# Patient Record
Sex: MALE | Race: BLACK OR AFRICAN AMERICAN | NOT HISPANIC OR LATINO | Employment: FULL TIME | URBAN - METROPOLITAN AREA
[De-identification: names, ages, dates, MRNs, and addresses within clinical notes are randomized per-mention and may not be internally consistent; named-entity substitution may affect disease eponyms.]

---

## 2017-01-31 ENCOUNTER — TELEPHONE (OUTPATIENT)
Dept: UROLOGY | Facility: CLINIC | Age: 62
End: 2017-01-31

## 2017-01-31 NOTE — TELEPHONE ENCOUNTER
----- Message from Lela Calderon MA sent at 1/31/2017  2:14 PM CST -----  Contact: self  [phone]  States he is being referred by a patient who sees CHAUNCEY Kay and he states it is very important that he accepts him as a patient.  He has [employer] which is a medicaid plan and I explained to him we do not accept Medicaid for adults.  He asked me to sent the message to find out if he would accept him as a patient.

## 2020-08-19 ENCOUNTER — HOSPITAL ENCOUNTER (INPATIENT)
Facility: HOSPITAL | Age: 65
LOS: 5 days | Discharge: HOME | DRG: 177 | End: 2020-08-24
Attending: EMERGENCY MEDICINE | Admitting: INTERNAL MEDICINE
Payer: MEDICARE

## 2020-08-19 DIAGNOSIS — J18.9 PNEUMONIA OF BOTH LUNGS DUE TO INFECTIOUS ORGANISM, UNSPECIFIED PART OF LUNG: ICD-10-CM

## 2020-08-19 DIAGNOSIS — I50.9 HEART FAILURE: ICD-10-CM

## 2020-08-19 DIAGNOSIS — N17.9 AKI (ACUTE KIDNEY INJURY): ICD-10-CM

## 2020-08-19 DIAGNOSIS — I50.31 ACUTE DIASTOLIC CONGESTIVE HEART FAILURE: ICD-10-CM

## 2020-08-19 DIAGNOSIS — D64.9 ANEMIA, UNSPECIFIED TYPE: ICD-10-CM

## 2020-08-19 DIAGNOSIS — E11.65 TYPE 2 DIABETES MELLITUS WITH HYPERGLYCEMIA, WITHOUT LONG-TERM CURRENT USE OF INSULIN: Chronic | ICD-10-CM

## 2020-08-19 DIAGNOSIS — I10 ESSENTIAL HYPERTENSION: Chronic | ICD-10-CM

## 2020-08-19 DIAGNOSIS — R74.01 TRANSAMINITIS: ICD-10-CM

## 2020-08-19 DIAGNOSIS — R09.02 HYPOXIA: ICD-10-CM

## 2020-08-19 DIAGNOSIS — Z20.822 SUSPECTED COVID-19 VIRUS INFECTION: ICD-10-CM

## 2020-08-19 DIAGNOSIS — E87.5 HYPERKALEMIA: ICD-10-CM

## 2020-08-19 DIAGNOSIS — U07.1 COVID-19: Primary | ICD-10-CM

## 2020-08-19 PROBLEM — J96.01 ACUTE RESPIRATORY FAILURE WITH HYPOXIA: Status: ACTIVE | Noted: 2020-08-19

## 2020-08-19 LAB
ALBUMIN SERPL BCP-MCNC: 3.1 G/DL (ref 3.5–5.2)
ALLENS TEST: ABNORMAL
ALP SERPL-CCNC: 235 U/L (ref 55–135)
ALT SERPL W/O P-5'-P-CCNC: 147 U/L (ref 10–44)
ANION GAP SERPL CALC-SCNC: 12 MMOL/L (ref 8–16)
AST SERPL-CCNC: 159 U/L (ref 10–40)
BACTERIA #/AREA URNS HPF: NORMAL /HPF
BASOPHILS # BLD AUTO: 0.01 K/UL (ref 0–0.2)
BASOPHILS # BLD AUTO: 0.01 K/UL (ref 0–0.2)
BASOPHILS NFR BLD: 0.1 % (ref 0–1.9)
BASOPHILS NFR BLD: 0.1 % (ref 0–1.9)
BILIRUB SERPL-MCNC: 0.9 MG/DL (ref 0.1–1)
BILIRUB UR QL STRIP: NEGATIVE
BNP SERPL-MCNC: 131 PG/ML (ref 0–99)
BUN SERPL-MCNC: 31 MG/DL (ref 8–23)
CALCIUM SERPL-MCNC: 9.3 MG/DL (ref 8.7–10.5)
CHLORIDE SERPL-SCNC: 110 MMOL/L (ref 95–110)
CK SERPL-CCNC: 66 U/L (ref 20–200)
CLARITY UR: CLEAR
CO2 SERPL-SCNC: 20 MMOL/L (ref 23–29)
COLOR UR: YELLOW
CREAT SERPL-MCNC: 2.5 MG/DL (ref 0.5–1.4)
CRP SERPL-MCNC: 208.7 MG/L (ref 0–8.2)
CTP QC/QA: YES
D DIMER PPP IA.FEU-MCNC: 3.48 MG/L FEU
DELSYS: ABNORMAL
DIFFERENTIAL METHOD: ABNORMAL
DIFFERENTIAL METHOD: ABNORMAL
EOSINOPHIL # BLD AUTO: 0 K/UL (ref 0–0.5)
EOSINOPHIL # BLD AUTO: 0 K/UL (ref 0–0.5)
EOSINOPHIL NFR BLD: 0 % (ref 0–8)
EOSINOPHIL NFR BLD: 0.5 % (ref 0–8)
ERYTHROCYTE [DISTWIDTH] IN BLOOD BY AUTOMATED COUNT: 11.8 % (ref 11.5–14.5)
ERYTHROCYTE [DISTWIDTH] IN BLOOD BY AUTOMATED COUNT: 11.9 % (ref 11.5–14.5)
EST. GFR  (AFRICAN AMERICAN): 30 ML/MIN/1.73 M^2
EST. GFR  (NON AFRICAN AMERICAN): 26 ML/MIN/1.73 M^2
FACT X PPP CHRO-ACNC: 0.43 IU/ML (ref 0.3–0.7)
FERRITIN SERPL-MCNC: 3845 NG/ML (ref 20–300)
FIBRINOGEN PPP-MCNC: >800 MG/DL (ref 182–366)
FIO2: 50
GLUCOSE SERPL-MCNC: 212 MG/DL (ref 70–110)
GLUCOSE UR QL STRIP: ABNORMAL
HCO3 UR-SCNC: 17.2 MMOL/L (ref 24–28)
HCT VFR BLD AUTO: 22.6 % (ref 40–54)
HCT VFR BLD AUTO: 27.2 % (ref 40–54)
HGB BLD-MCNC: 7.3 G/DL (ref 14–18)
HGB BLD-MCNC: 9 G/DL (ref 14–18)
HGB UR QL STRIP: NEGATIVE
HYALINE CASTS #/AREA URNS LPF: 0 /LPF
IMM GRANULOCYTES # BLD AUTO: 0.17 K/UL (ref 0–0.04)
IMM GRANULOCYTES # BLD AUTO: 0.22 K/UL (ref 0–0.04)
IMM GRANULOCYTES NFR BLD AUTO: 2.2 % (ref 0–0.5)
IMM GRANULOCYTES NFR BLD AUTO: 2.3 % (ref 0–0.5)
INR PPP: 0.9 (ref 0.8–1.2)
INR PPP: 0.9 (ref 0.8–1.2)
IRON SERPL-MCNC: 27 UG/DL (ref 45–160)
KETONES UR QL STRIP: NEGATIVE
LACTATE SERPL-SCNC: 2 MMOL/L (ref 0.5–2.2)
LDH SERPL L TO P-CCNC: 768 U/L (ref 110–260)
LEUKOCYTE ESTERASE UR QL STRIP: NEGATIVE
LYMPHOCYTES # BLD AUTO: 0.8 K/UL (ref 1–4.8)
LYMPHOCYTES # BLD AUTO: 1.4 K/UL (ref 1–4.8)
LYMPHOCYTES NFR BLD: 19.1 % (ref 18–48)
LYMPHOCYTES NFR BLD: 8.1 % (ref 18–48)
MCH RBC QN AUTO: 30.3 PG (ref 27–31)
MCH RBC QN AUTO: 31 PG (ref 27–31)
MCHC RBC AUTO-ENTMCNC: 32.3 G/DL (ref 32–36)
MCHC RBC AUTO-ENTMCNC: 33.1 G/DL (ref 32–36)
MCV RBC AUTO: 94 FL (ref 82–98)
MCV RBC AUTO: 94 FL (ref 82–98)
MICROSCOPIC COMMENT: NORMAL
MODE: ABNORMAL
MONOCYTES # BLD AUTO: 0.4 K/UL (ref 0.3–1)
MONOCYTES # BLD AUTO: 0.4 K/UL (ref 0.3–1)
MONOCYTES NFR BLD: 3.7 % (ref 4–15)
MONOCYTES NFR BLD: 5.7 % (ref 4–15)
NEUTROPHILS # BLD AUTO: 5.3 K/UL (ref 1.8–7.7)
NEUTROPHILS # BLD AUTO: 8.5 K/UL (ref 1.8–7.7)
NEUTROPHILS NFR BLD: 72.3 % (ref 38–73)
NEUTROPHILS NFR BLD: 85.9 % (ref 38–73)
NITRITE UR QL STRIP: NEGATIVE
NRBC BLD-RTO: 0 /100 WBC
NRBC BLD-RTO: 0 /100 WBC
PCO2 BLDA: 27.4 MMHG (ref 35–45)
PH SMN: 7.4 [PH] (ref 7.35–7.45)
PH UR STRIP: 7 [PH] (ref 5–8)
PLATELET # BLD AUTO: 200 K/UL (ref 150–350)
PLATELET # BLD AUTO: 253 K/UL (ref 150–350)
PMV BLD AUTO: 12.1 FL (ref 9.2–12.9)
PMV BLD AUTO: 12.7 FL (ref 9.2–12.9)
PO2 BLDA: 55 MMHG (ref 80–100)
POC BE: -7 MMOL/L
POC MOLECULAR INFLUENZA A AGN: NEGATIVE
POC MOLECULAR INFLUENZA B AGN: NEGATIVE
POC SATURATED O2: 89 % (ref 95–100)
POC TCO2: 18 MMOL/L (ref 23–27)
POTASSIUM SERPL-SCNC: 5.4 MMOL/L (ref 3.5–5.1)
PROCALCITONIN SERPL IA-MCNC: 0.47 NG/ML
PROT SERPL-MCNC: 7.4 G/DL (ref 6–8.4)
PROT UR QL STRIP: ABNORMAL
PROTHROMBIN TIME: 10.2 SEC (ref 9–12.5)
PROTHROMBIN TIME: 9.8 SEC (ref 9–12.5)
RBC # BLD AUTO: 2.41 M/UL (ref 4.6–6.2)
RBC # BLD AUTO: 2.9 M/UL (ref 4.6–6.2)
RBC #/AREA URNS HPF: 3 /HPF (ref 0–4)
RETICS/RBC NFR AUTO: 0.9 % (ref 0.4–2)
SAMPLE: ABNORMAL
SARS-COV-2 RDRP RESP QL NAA+PROBE: POSITIVE
SATURATED IRON: 13 % (ref 20–50)
SITE: ABNORMAL
SODIUM SERPL-SCNC: 142 MMOL/L (ref 136–145)
SP GR UR STRIP: 1.01 (ref 1–1.03)
SQUAMOUS #/AREA URNS HPF: 2 /HPF
TOTAL IRON BINDING CAPACITY: 209 UG/DL (ref 250–450)
TRANSFERRIN SERPL-MCNC: 141 MG/DL (ref 200–375)
TROPONIN I SERPL DL<=0.01 NG/ML-MCNC: 0.03 NG/ML (ref 0–0.03)
URN SPEC COLLECT METH UR: ABNORMAL
UROBILINOGEN UR STRIP-ACNC: NEGATIVE EU/DL
WBC # BLD AUTO: 7.38 K/UL (ref 3.9–12.7)
WBC # BLD AUTO: 9.87 K/UL (ref 3.9–12.7)
WBC #/AREA URNS HPF: 0 /HPF (ref 0–5)

## 2020-08-19 PROCEDURE — 84145 PROCALCITONIN (PCT): CPT

## 2020-08-19 PROCEDURE — 25000003 PHARM REV CODE 250: Performed by: EMERGENCY MEDICINE

## 2020-08-19 PROCEDURE — U0002 COVID-19 LAB TEST NON-CDC: HCPCS

## 2020-08-19 PROCEDURE — 84484 ASSAY OF TROPONIN QUANT: CPT

## 2020-08-19 PROCEDURE — 83615 LACTATE (LD) (LDH) ENZYME: CPT

## 2020-08-19 PROCEDURE — 81000 URINALYSIS NONAUTO W/SCOPE: CPT

## 2020-08-19 PROCEDURE — 85610 PROTHROMBIN TIME: CPT | Mod: 91

## 2020-08-19 PROCEDURE — 86140 C-REACTIVE PROTEIN: CPT

## 2020-08-19 PROCEDURE — 82803 BLOOD GASES ANY COMBINATION: CPT

## 2020-08-19 PROCEDURE — 80053 COMPREHEN METABOLIC PANEL: CPT

## 2020-08-19 PROCEDURE — 63600175 PHARM REV CODE 636 W HCPCS: Performed by: EMERGENCY MEDICINE

## 2020-08-19 PROCEDURE — 96361 HYDRATE IV INFUSION ADD-ON: CPT

## 2020-08-19 PROCEDURE — 85384 FIBRINOGEN ACTIVITY: CPT

## 2020-08-19 PROCEDURE — 83605 ASSAY OF LACTIC ACID: CPT

## 2020-08-19 PROCEDURE — 83540 ASSAY OF IRON: CPT

## 2020-08-19 PROCEDURE — 85025 COMPLETE CBC W/AUTO DIFF WBC: CPT | Mod: 91

## 2020-08-19 PROCEDURE — 99223 1ST HOSP IP/OBS HIGH 75: CPT | Mod: AI,,, | Performed by: INTERNAL MEDICINE

## 2020-08-19 PROCEDURE — 99223 PR INITIAL HOSPITAL CARE,LEVL III: ICD-10-PCS | Mod: AI,,, | Performed by: INTERNAL MEDICINE

## 2020-08-19 PROCEDURE — 25000003 PHARM REV CODE 250: Performed by: STUDENT IN AN ORGANIZED HEALTH CARE EDUCATION/TRAINING PROGRAM

## 2020-08-19 PROCEDURE — 36600 WITHDRAWAL OF ARTERIAL BLOOD: CPT

## 2020-08-19 PROCEDURE — 20000000 HC ICU ROOM

## 2020-08-19 PROCEDURE — 94761 N-INVAS EAR/PLS OXIMETRY MLT: CPT

## 2020-08-19 PROCEDURE — 85379 FIBRIN DEGRADATION QUANT: CPT

## 2020-08-19 PROCEDURE — 96374 THER/PROPH/DIAG INJ IV PUSH: CPT | Mod: 59

## 2020-08-19 PROCEDURE — 80048 BASIC METABOLIC PNL TOTAL CA: CPT

## 2020-08-19 PROCEDURE — 87502 INFLUENZA DNA AMP PROBE: CPT | Mod: 59

## 2020-08-19 PROCEDURE — 96366 THER/PROPH/DIAG IV INF ADDON: CPT

## 2020-08-19 PROCEDURE — 83880 ASSAY OF NATRIURETIC PEPTIDE: CPT

## 2020-08-19 PROCEDURE — 99900035 HC TECH TIME PER 15 MIN (STAT)

## 2020-08-19 PROCEDURE — 99285 EMERGENCY DEPT VISIT HI MDM: CPT | Mod: 25

## 2020-08-19 PROCEDURE — 27100171 HC OXYGEN HIGH FLOW UP TO 24 HOURS

## 2020-08-19 PROCEDURE — 96375 TX/PRO/DX INJ NEW DRUG ADDON: CPT

## 2020-08-19 PROCEDURE — 87040 BLOOD CULTURE FOR BACTERIA: CPT

## 2020-08-19 PROCEDURE — 85025 COMPLETE CBC W/AUTO DIFF WBC: CPT

## 2020-08-19 PROCEDURE — 93010 ELECTROCARDIOGRAM REPORT: CPT | Mod: ,,, | Performed by: INTERNAL MEDICINE

## 2020-08-19 PROCEDURE — 83010 ASSAY OF HAPTOGLOBIN QUANT: CPT

## 2020-08-19 PROCEDURE — 93010 EKG 12-LEAD: ICD-10-PCS | Mod: ,,, | Performed by: INTERNAL MEDICINE

## 2020-08-19 PROCEDURE — 82728 ASSAY OF FERRITIN: CPT

## 2020-08-19 PROCEDURE — 96365 THER/PROPH/DIAG IV INF INIT: CPT

## 2020-08-19 PROCEDURE — 82550 ASSAY OF CK (CPK): CPT

## 2020-08-19 PROCEDURE — 85045 AUTOMATED RETICULOCYTE COUNT: CPT

## 2020-08-19 PROCEDURE — 85610 PROTHROMBIN TIME: CPT

## 2020-08-19 PROCEDURE — 25000003 PHARM REV CODE 250: Performed by: NURSE PRACTITIONER

## 2020-08-19 PROCEDURE — 85520 HEPARIN ASSAY: CPT

## 2020-08-19 PROCEDURE — 63600175 PHARM REV CODE 636 W HCPCS: Performed by: NURSE PRACTITIONER

## 2020-08-19 PROCEDURE — 93005 ELECTROCARDIOGRAM TRACING: CPT

## 2020-08-19 RX ORDER — GLIMEPIRIDE 4 MG/1
4 TABLET ORAL
Status: ON HOLD | COMMUNITY
End: 2021-05-04 | Stop reason: HOSPADM

## 2020-08-19 RX ORDER — INSULIN ASPART 100 [IU]/ML
0-5 INJECTION, SOLUTION INTRAVENOUS; SUBCUTANEOUS EVERY 6 HOURS PRN
Status: DISCONTINUED | OUTPATIENT
Start: 2020-08-19 | End: 2020-08-20

## 2020-08-19 RX ORDER — CLONIDINE HYDROCHLORIDE 0.1 MG/1
0.1 TABLET ORAL 2 TIMES DAILY
Status: ON HOLD | COMMUNITY
End: 2020-08-24 | Stop reason: HOSPADM

## 2020-08-19 RX ORDER — ENOXAPARIN SODIUM 100 MG/ML
40 INJECTION SUBCUTANEOUS
Status: COMPLETED | OUTPATIENT
Start: 2020-08-19 | End: 2020-08-19

## 2020-08-19 RX ORDER — DOXAZOSIN 1 MG/1
2 TABLET ORAL NIGHTLY
Status: DISCONTINUED | OUTPATIENT
Start: 2020-08-20 | End: 2020-08-24 | Stop reason: HOSPADM

## 2020-08-19 RX ORDER — METOPROLOL TARTRATE 50 MG/1
50 TABLET ORAL 2 TIMES DAILY
Status: ON HOLD | COMMUNITY
End: 2020-08-24 | Stop reason: HOSPADM

## 2020-08-19 RX ORDER — NAPROXEN SODIUM 220 MG/1
81 TABLET, FILM COATED ORAL DAILY
COMMUNITY

## 2020-08-19 RX ORDER — TALC
6 POWDER (GRAM) TOPICAL NIGHTLY PRN
Status: DISCONTINUED | OUTPATIENT
Start: 2020-08-19 | End: 2020-08-24 | Stop reason: HOSPADM

## 2020-08-19 RX ORDER — NICARDIPINE HYDROCHLORIDE 0.2 MG/ML
2.5 INJECTION INTRAVENOUS CONTINUOUS
Status: DISCONTINUED | OUTPATIENT
Start: 2020-08-19 | End: 2020-08-20

## 2020-08-19 RX ORDER — HEPARIN SODIUM,PORCINE/D5W 25000/250
12 INTRAVENOUS SOLUTION INTRAVENOUS CONTINUOUS
Status: DISCONTINUED | OUTPATIENT
Start: 2020-08-19 | End: 2020-08-22

## 2020-08-19 RX ORDER — DOXAZOSIN 2 MG/1
2 TABLET ORAL NIGHTLY
Status: ON HOLD | COMMUNITY
End: 2021-05-04 | Stop reason: HOSPADM

## 2020-08-19 RX ORDER — IRBESARTAN 300 MG/1
300 TABLET ORAL NIGHTLY
Status: ON HOLD | COMMUNITY
End: 2021-05-04 | Stop reason: HOSPADM

## 2020-08-19 RX ORDER — METFORMIN HYDROCHLORIDE 1000 MG/1
1000 TABLET ORAL 2 TIMES DAILY WITH MEALS
Status: ON HOLD | COMMUNITY
End: 2020-08-24 | Stop reason: SDUPTHER

## 2020-08-19 RX ORDER — DEXAMETHASONE SODIUM PHOSPHATE 4 MG/ML
6 INJECTION, SOLUTION INTRA-ARTICULAR; INTRALESIONAL; INTRAMUSCULAR; INTRAVENOUS; SOFT TISSUE DAILY
Status: DISCONTINUED | OUTPATIENT
Start: 2020-08-20 | End: 2020-08-21

## 2020-08-19 RX ORDER — CARVEDILOL 6.25 MG/1
6.25 TABLET ORAL 2 TIMES DAILY
Status: DISCONTINUED | OUTPATIENT
Start: 2020-08-19 | End: 2020-08-20

## 2020-08-19 RX ORDER — DEXAMETHASONE SODIUM PHOSPHATE 4 MG/ML
6 INJECTION, SOLUTION INTRA-ARTICULAR; INTRALESIONAL; INTRAMUSCULAR; INTRAVENOUS; SOFT TISSUE
Status: COMPLETED | OUTPATIENT
Start: 2020-08-19 | End: 2020-08-19

## 2020-08-19 RX ORDER — GLUCAGON 1 MG
1 KIT INJECTION
Status: DISCONTINUED | OUTPATIENT
Start: 2020-08-19 | End: 2020-08-20

## 2020-08-19 RX ORDER — PRAVASTATIN SODIUM 40 MG/1
40 TABLET ORAL DAILY
COMMUNITY

## 2020-08-19 RX ADMIN — MELATONIN TAB 3 MG 6 MG: 3 TAB at 11:08

## 2020-08-19 RX ADMIN — SODIUM CHLORIDE 500 ML: 0.9 INJECTION, SOLUTION INTRAVENOUS at 02:08

## 2020-08-19 RX ADMIN — NICARDIPINE HYDROCHLORIDE 15 MG/HR: 0.2 INJECTION, SOLUTION INTRAVENOUS at 08:08

## 2020-08-19 RX ADMIN — NICARDIPINE HYDROCHLORIDE 2.5 MG/HR: 0.2 INJECTION, SOLUTION INTRAVENOUS at 04:08

## 2020-08-19 RX ADMIN — ENOXAPARIN SODIUM 40 MG: 40 INJECTION SUBCUTANEOUS at 02:08

## 2020-08-19 RX ADMIN — DEXAMETHASONE SODIUM PHOSPHATE 6 MG: 4 INJECTION, SOLUTION INTRA-ARTICULAR; INTRALESIONAL; INTRAMUSCULAR; INTRAVENOUS; SOFT TISSUE at 02:08

## 2020-08-19 RX ADMIN — CARVEDILOL 6.25 MG: 6.25 TABLET, FILM COATED ORAL at 11:08

## 2020-08-19 RX ADMIN — HEPARIN SODIUM 12 UNITS/KG/HR: 5000 INJECTION INTRAVENOUS; SUBCUTANEOUS at 09:08

## 2020-08-19 RX ADMIN — NICARDIPINE HYDROCHLORIDE 8 MG/HR: 0.2 INJECTION, SOLUTION INTRAVENOUS at 11:08

## 2020-08-19 RX ADMIN — AZITHROMYCIN 500 MG: 500 INJECTION, POWDER, LYOPHILIZED, FOR SOLUTION INTRAVENOUS at 04:08

## 2020-08-19 RX ADMIN — CEFTRIAXONE 1 G: 1 INJECTION, SOLUTION INTRAVENOUS at 05:08

## 2020-08-19 RX ADMIN — NICARDIPINE HYDROCHLORIDE 15 MG/HR: 0.2 INJECTION, SOLUTION INTRAVENOUS at 07:08

## 2020-08-19 NOTE — ED TRIAGE NOTES
Had cataract surgery x 5 days ago.  Called EMS for elevated bp, headache, and cough.  Upon arrival complaints of chest discomfort with sob.  Initial O2 sat of 61 %.

## 2020-08-19 NOTE — ED PROVIDER NOTES
"EM PHYSICIAN NOTE    HPI  This patient presents with a complaint of   Chief Complaint   Patient presents with    Shortness of Breath     pt called EMS for elevated BP, headache, and cough.  upon arival found with chest discomfort with sob x 2 days.   denies n/v/d or fever.   denies HA upon arrival to ed.       HPI:  This is a 65-year-old gentleman with a past history of non-insulin-dependent diabetes, CKD and recent cataract surgery approximately 10 days ago who presents the emergency department with about 2 days shortness of breath.  He denies fever or chills.  No nausea or vomiting.  No diarrhea.  He does report he has a mild headache and chest discomfort.  He has had a cough and he called EMS because he was coughing and short of breath.    REVIEW of PMH, SOC History and Family History:  Past Medical History:   Diagnosis Date    Diabetes mellitus, type 2     Hyperlipidemia     Hypertension 2004     No past surgical history on file.  Review of patient's allergies indicates:  No Known Allergies    Family history and social history reviewed.      REVIEW of SYSTEMS  Source:  Patient  The nurse's notes and triage vital signs were reviewed.  GENERAL/CONSTITUTIONAL: There is no report of fever, weakness, or unexplained weight loss.  CARDIOVASCULAR:  See HPI  RESPIRATORY:  See HPI  GASTROINTESTINAL: There is no report of nausea, vomiting, diarrhea  MUSCULOSKELETAL: There is no report of joint or muscle pain. No muscle weakness or tenderness.  SKIN AND BREASTS: There is no report of easy bruising, skin redness, skin rash.  HEMATOLOGIC/LYMPHATIC: There is no report of anemia, bleeding or clotting defects. There is no report of anticoagulant use.  The remainder of the ROS is negative.        PHYSICAL EXAMINATION    ED Triage Vitals [08/19/20 1323]   Enc Vitals Group      BP (!) 222/100      Pulse (!) 113      Resp 18      Temp 99 °F (37.2 °C)      Temp src Oral      SpO2 (!) 77 %      Weight 160 lb      Height 5' 8"      " Head Circumference       Peak Flow       Pain Score       Pain Loc       Pain Edu?       Excl. in GC?      Vital signs and Pulse Ox reviewed in clinical context. Abnormalities noted: Tachycardia, Tachypnea, Hypoxia on pulse ox  Pt's level of consciousness is alert, and the patient is in moderate distress.  Skin: warm, pink and dry  Mucosa:dry  Head and Neck: WNL  Cardiac exam: RRR, tachycardia  Pulmonary exam:  Tachypneic with crackles throughout.  Mild JVD.  Abd Exam: soft nontender   Musculoskeletal: no joint tenderness, deformity or swelling   Neurologic: GCS 15. 5 over 5 strength, cranial nerves intact, neck supple       Initial Impression:  Hypoxia, rule out CHF, rule out PE, rule out COVID  Plan:  Supplementary oxygen, labs, imaging, reassess  Micelle JYOTI Coughlin MD, 1:20 PM 8/19/2020      Medical decision making:   Nurses notes and Vital Signs reviewed.  Orders Placed This Encounter   Procedures    Blood culture x two cultures. Draw prior to antibiotics.    X-Ray Chest AP Portable    CBC auto differential    Comprehensive metabolic panel    C-Reactive Protein    Ferritin    Lactate Dehydrogenase    CK    Lactic Acid, Plasma    Troponin I    COVID-19 Rapid Screening    Procalcitonin    Brain Natriuretic Peptide    D dimer, quantitative    Urinalysis Clean Catch    Fibrinogen    Haptoglobin    Protime-INR    Reticulocytes    Iron and TIBC    Urinalysis Microscopic    Airborne and Contact and Droplet Isolation Status    Airborne and Contact and Droplet Isolation Status    Oxygen Continuous    Oxygen Continuous    POCT ARTERIAL BLOOD GAS Blood Gas    POCT Influenza A/B Molecular    EKG 12-lead    Saline lock IV    [facility] Facilitated Request from Lat[facility] Art, and [facility]       ED Course as of Aug 19 1729   Wed Aug 19, 2020   1409 The CBC reveals anemia at 7 and 22.  There is no old ones for comparison.    []   1409 COVID positive    []   1412 Moderate to severe opacification  with ill-defined consolidation within the perihilar and lower lobes while nonspecific differential to include COVID-19 pneumoniae.     [MH]   1432 Dr. Feliciano, Nelson , accept pt to [facility].    [MH]   1454 BNP elevated at 131    [MH]   1455 Troponin elevated at 0.031.    [MH]   1455 My independent interpretation of the EKG is sinus tachycardia at 107 beats per minute.  There is LVH.  There is no ST segment elevation or depression.  Normal axis normal intervals.  No old EKGs for comparison    [MH]   1456 CMP reviewed.  There is KOURTNEY I with a creatinine of 2.5.  The BUN is elevated at 31.  Potassium is elevated 5.4.  CO2 is slightly decreased at 20.  Glucose is elevated 212.  Anion gap is 12.    [MH]   1457 D-dimer is elevated at 3480    [MH]   1457 LV is elevated at 768    [MH]   1457 Unable to maintain O2 sats above 90% on a Venti mask and the patient was switched to 100% non-rebreather.  His sats remained 95 percent on 100% non-rebreather.  ABG pending.    [MH]   1713 Wife's phone number is [phone]    [MH]      ED Course User Index  [MH] Kaushal Coughlin MD       4:03 PM  The medical management of Filippo Martinez has been transferred to the admitting team. Total Critical Care Time provided by me was: 75 minutes and included frequent in-person monitoring of patient including vital signs, neurological status, NIHSS or ICP monitoring with ongoing treatment changes based on patient need  Coordination of care with other physicians  Review and interpretation of radiologic studies with re-assessment of plan of care  Review and interpretation of laboratory studies with re-assessment of plan of care  Review of diagnosis, treatment options and prognosis with patient  Review of diagnosis, treatment options and prognosis with family/caregiver.  At this time, the patient's condition is unchanged, and this was relayed to the accepting team.  Please see notes from the admitting team for continued care.  Kaushal SMITH  Madyson 4:03 PM          Diagnoses that have been ruled out:   None   Diagnoses that are still under consideration:   None   Final diagnoses:   Suspected Covid-19 Virus Infection   COVID-19   Hypoxia   Pneumonia of both lungs due to infectious organism, unspecified part of lung   KOURTNEY (acute kidney injury)   Transaminitis   Anemia, unspecified type            Follow-up Information    None       ED Prescriptions     None          This note was created using Dictation Software.  This program may occasionally misinterpret certain words and phrases.      SCRIBE ATTESTATION NOTE:  No scribe was used in the writing of this note  Nurses notes were reviewed.                      Kaushal Coughlin MD  08/19/20 1603       Kaushal Coughlin MD  08/19/20 1604       Kaushal Coughlin MD  08/19/20 0951

## 2020-08-19 NOTE — PLAN OF CARE
(Physician in Lead of Transfers)   Outside Transfer Acceptance Note / Regional Referral Center    Transferring Physician: Tram Coughlin MD (ED)    Accepting Physician: Yazmin Yusuf MD    Date of Acceptance: 08/19/2020    Transferring Facility:  ED    Reason for Transfer: COVID critical illness    Report from Transferring Physician/Hospital course: 65M with HTN, HLD, DM-2 (orals), CKD (unkknown stage), cataracts s/p surgery 10 days ago, COVID negative at that time, who presented to  ED with SOB, cough, chest discomfort, HA, and elevated BP.  No fever, no GI Sx.  In ED, COVID +, 61% on RA -- on VM, unable to get sats above 90% --> placed on 100% NRB can get above 90% sat, and RR wnl, appears comfortable.  On PE, B crackles, no JVD, dry mucosa, abd s/nt/nd. CXR - diffuse infiltrates. Hb 7/22  (last labs 2017 in Care Everywhere, Hb 12), Cr 2.5 (unknown baseline), K 5.4. trop 0.03. LDH increased, D Dimer 3,480.  Given Dex IV, enoxaparin 40, adding CTX/Azithro.  Transfer [place] [facility], accepted by Dr Hurtado.    VS: see above and Epic    Labs: see above and Epic    Radiographs: see above and Epic    To Do List: f/u ABG, anemia labs (including hemolysis panel),  Intubation watch. Consider CTA.     Upon patient arrival to floor, please send SecureChat to [facility] or call extension [phone] (if no answer, this will flip to a beeper, so enter your call back number) for Hospital Medicine admit team assignment and for additional admit orders for the patient.  Do not page the attending physician associated with the patient on arrival (this physician may not be on duty at the time of arrival).  Rather, always call [phone] to reach the triage physician for orders and team assignment.    Yazmin Yusuf MD  Hospital Medicine Staff  Cell: [phone]

## 2020-08-20 LAB
ALBUMIN SERPL BCP-MCNC: 2.9 G/DL (ref 3.5–5.2)
ALP SERPL-CCNC: 233 U/L (ref 55–135)
ALT SERPL W/O P-5'-P-CCNC: 118 U/L (ref 10–44)
ANION GAP SERPL CALC-SCNC: 10 MMOL/L (ref 8–16)
ANION GAP SERPL CALC-SCNC: 11 MMOL/L (ref 8–16)
ANION GAP SERPL CALC-SCNC: 11 MMOL/L (ref 8–16)
ANION GAP SERPL CALC-SCNC: 12 MMOL/L (ref 8–16)
ANION GAP SERPL CALC-SCNC: 16 MMOL/L (ref 8–16)
ANISOCYTOSIS BLD QL SMEAR: SLIGHT
AST SERPL-CCNC: 76 U/L (ref 10–40)
B-OH-BUTYR BLD STRIP-SCNC: 0.7 MMOL/L (ref 0–0.5)
BASOPHILS # BLD AUTO: 0 K/UL (ref 0–0.2)
BASOPHILS NFR BLD: 0 % (ref 0–1.9)
BILIRUB SERPL-MCNC: 0.5 MG/DL (ref 0.1–1)
BUN SERPL-MCNC: 31 MG/DL (ref 8–23)
BUN SERPL-MCNC: 33 MG/DL (ref 8–23)
BUN SERPL-MCNC: 34 MG/DL (ref 8–23)
BUN SERPL-MCNC: 35 MG/DL (ref 8–23)
BUN SERPL-MCNC: 35 MG/DL (ref 8–23)
CALCIUM SERPL-MCNC: 9.2 MG/DL (ref 8.7–10.5)
CALCIUM SERPL-MCNC: 9.3 MG/DL (ref 8.7–10.5)
CALCIUM SERPL-MCNC: 9.3 MG/DL (ref 8.7–10.5)
CALCIUM SERPL-MCNC: 9.4 MG/DL (ref 8.7–10.5)
CALCIUM SERPL-MCNC: 9.7 MG/DL (ref 8.7–10.5)
CHLORIDE SERPL-SCNC: 107 MMOL/L (ref 95–110)
CHLORIDE SERPL-SCNC: 108 MMOL/L (ref 95–110)
CHLORIDE SERPL-SCNC: 111 MMOL/L (ref 95–110)
CO2 SERPL-SCNC: 16 MMOL/L (ref 23–29)
CO2 SERPL-SCNC: 20 MMOL/L (ref 23–29)
CO2 SERPL-SCNC: 23 MMOL/L (ref 23–29)
CREAT SERPL-MCNC: 2.2 MG/DL (ref 0.5–1.4)
CREAT SERPL-MCNC: 2.3 MG/DL (ref 0.5–1.4)
CREAT SERPL-MCNC: 2.5 MG/DL (ref 0.5–1.4)
CREAT SERPL-MCNC: 2.6 MG/DL (ref 0.5–1.4)
CREAT SERPL-MCNC: 2.6 MG/DL (ref 0.5–1.4)
D DIMER PPP IA.FEU-MCNC: 2.19 MG/L FEU
DACRYOCYTES BLD QL SMEAR: ABNORMAL
DIFFERENTIAL METHOD: ABNORMAL
EOSINOPHIL # BLD AUTO: 0 K/UL (ref 0–0.5)
EOSINOPHIL NFR BLD: 0 % (ref 0–8)
ERYTHROCYTE [DISTWIDTH] IN BLOOD BY AUTOMATED COUNT: 11.9 % (ref 11.5–14.5)
EST. GFR  (AFRICAN AMERICAN): 28.6 ML/MIN/1.73 M^2
EST. GFR  (AFRICAN AMERICAN): 28.6 ML/MIN/1.73 M^2
EST. GFR  (AFRICAN AMERICAN): 30 ML/MIN/1.73 M^2
EST. GFR  (AFRICAN AMERICAN): 33.2 ML/MIN/1.73 M^2
EST. GFR  (AFRICAN AMERICAN): 35 ML/MIN/1.73 M^2
EST. GFR  (NON AFRICAN AMERICAN): 24.8 ML/MIN/1.73 M^2
EST. GFR  (NON AFRICAN AMERICAN): 24.8 ML/MIN/1.73 M^2
EST. GFR  (NON AFRICAN AMERICAN): 26 ML/MIN/1.73 M^2
EST. GFR  (NON AFRICAN AMERICAN): 28.7 ML/MIN/1.73 M^2
EST. GFR  (NON AFRICAN AMERICAN): 30.3 ML/MIN/1.73 M^2
ESTIMATED AVG GLUCOSE: 163 MG/DL (ref 68–131)
FACT X PPP CHRO-ACNC: 0.3 IU/ML (ref 0.3–0.7)
FACT X PPP CHRO-ACNC: 0.57 IU/ML (ref 0.3–0.7)
GLUCOSE SERPL-MCNC: 128 MG/DL (ref 70–110)
GLUCOSE SERPL-MCNC: 215 MG/DL (ref 70–110)
GLUCOSE SERPL-MCNC: 469 MG/DL (ref 70–110)
GLUCOSE SERPL-MCNC: 469 MG/DL (ref 70–110)
GLUCOSE SERPL-MCNC: 503 MG/DL (ref 70–110)
HAPTOGLOB SERPL-MCNC: 546 MG/DL (ref 30–250)
HAPTOGLOB SERPL-MCNC: 548 MG/DL (ref 30–250)
HBA1C MFR BLD HPLC: 7.3 % (ref 4–5.6)
HCT VFR BLD AUTO: 22.8 % (ref 40–54)
HGB BLD-MCNC: 7.4 G/DL (ref 14–18)
IMM GRANULOCYTES # BLD AUTO: 0.24 K/UL (ref 0–0.04)
IMM GRANULOCYTES NFR BLD AUTO: 3.7 % (ref 0–0.5)
LYMPHOCYTES # BLD AUTO: 0.9 K/UL (ref 1–4.8)
LYMPHOCYTES NFR BLD: 13.3 % (ref 18–48)
MAGNESIUM SERPL-MCNC: 2.3 MG/DL (ref 1.6–2.6)
MCH RBC QN AUTO: 30.5 PG (ref 27–31)
MCHC RBC AUTO-ENTMCNC: 32.5 G/DL (ref 32–36)
MCV RBC AUTO: 94 FL (ref 82–98)
MONOCYTES # BLD AUTO: 0.3 K/UL (ref 0.3–1)
MONOCYTES NFR BLD: 5.3 % (ref 4–15)
NEUTROPHILS # BLD AUTO: 5 K/UL (ref 1.8–7.7)
NEUTROPHILS NFR BLD: 77.7 % (ref 38–73)
NRBC BLD-RTO: 0 /100 WBC
PHOSPHATE SERPL-MCNC: 4 MG/DL (ref 2.7–4.5)
PLATELET # BLD AUTO: 251 K/UL (ref 150–350)
PLATELET BLD QL SMEAR: ABNORMAL
PMV BLD AUTO: 12.7 FL (ref 9.2–12.9)
POCT GLUCOSE: 102 MG/DL (ref 70–110)
POCT GLUCOSE: 107 MG/DL (ref 70–110)
POCT GLUCOSE: 136 MG/DL (ref 70–110)
POCT GLUCOSE: 141 MG/DL (ref 70–110)
POCT GLUCOSE: 150 MG/DL (ref 70–110)
POCT GLUCOSE: 188 MG/DL (ref 70–110)
POCT GLUCOSE: 189 MG/DL (ref 70–110)
POCT GLUCOSE: 201 MG/DL (ref 70–110)
POCT GLUCOSE: 230 MG/DL (ref 70–110)
POCT GLUCOSE: 253 MG/DL (ref 70–110)
POCT GLUCOSE: 264 MG/DL (ref 70–110)
POCT GLUCOSE: 275 MG/DL (ref 70–110)
POCT GLUCOSE: 286 MG/DL (ref 70–110)
POCT GLUCOSE: 300 MG/DL (ref 70–110)
POCT GLUCOSE: 323 MG/DL (ref 70–110)
POCT GLUCOSE: 375 MG/DL (ref 70–110)
POCT GLUCOSE: 432 MG/DL (ref 70–110)
POCT GLUCOSE: 465 MG/DL (ref 70–110)
POCT GLUCOSE: 468 MG/DL (ref 70–110)
POCT GLUCOSE: 469 MG/DL (ref 70–110)
POCT GLUCOSE: 80 MG/DL (ref 70–110)
POIKILOCYTOSIS BLD QL SMEAR: SLIGHT
POTASSIUM SERPL-SCNC: 4.8 MMOL/L (ref 3.5–5.1)
POTASSIUM SERPL-SCNC: 4.8 MMOL/L (ref 3.5–5.1)
POTASSIUM SERPL-SCNC: 5 MMOL/L (ref 3.5–5.1)
POTASSIUM SERPL-SCNC: 5 MMOL/L (ref 3.5–5.1)
POTASSIUM SERPL-SCNC: 5.5 MMOL/L (ref 3.5–5.1)
POTASSIUM SERPL-SCNC: 5.9 MMOL/L (ref 3.5–5.1)
PROT SERPL-MCNC: 7.7 G/DL (ref 6–8.4)
RBC # BLD AUTO: 2.43 M/UL (ref 4.6–6.2)
SODIUM SERPL-SCNC: 138 MMOL/L (ref 136–145)
SODIUM SERPL-SCNC: 138 MMOL/L (ref 136–145)
SODIUM SERPL-SCNC: 139 MMOL/L (ref 136–145)
SODIUM SERPL-SCNC: 141 MMOL/L (ref 136–145)
SODIUM SERPL-SCNC: 143 MMOL/L (ref 136–145)
TARGETS BLD QL SMEAR: ABNORMAL
TROPONIN I SERPL DL<=0.01 NG/ML-MCNC: 0.02 NG/ML (ref 0–0.03)
WBC # BLD AUTO: 6.47 K/UL (ref 3.9–12.7)

## 2020-08-20 PROCEDURE — 83036 HEMOGLOBIN GLYCOSYLATED A1C: CPT

## 2020-08-20 PROCEDURE — 80053 COMPREHEN METABOLIC PANEL: CPT

## 2020-08-20 PROCEDURE — 20000000 HC ICU ROOM

## 2020-08-20 PROCEDURE — 80048 BASIC METABOLIC PNL TOTAL CA: CPT | Mod: 91

## 2020-08-20 PROCEDURE — 85520 HEPARIN ASSAY: CPT

## 2020-08-20 PROCEDURE — 85520 HEPARIN ASSAY: CPT | Mod: 91

## 2020-08-20 PROCEDURE — C9399 UNCLASSIFIED DRUGS OR BIOLOG: HCPCS | Performed by: STUDENT IN AN ORGANIZED HEALTH CARE EDUCATION/TRAINING PROGRAM

## 2020-08-20 PROCEDURE — 25000003 PHARM REV CODE 250: Performed by: STUDENT IN AN ORGANIZED HEALTH CARE EDUCATION/TRAINING PROGRAM

## 2020-08-20 PROCEDURE — 94761 N-INVAS EAR/PLS OXIMETRY MLT: CPT

## 2020-08-20 PROCEDURE — 63600175 PHARM REV CODE 636 W HCPCS: Performed by: STUDENT IN AN ORGANIZED HEALTH CARE EDUCATION/TRAINING PROGRAM

## 2020-08-20 PROCEDURE — 82010 KETONE BODYS QUAN: CPT

## 2020-08-20 PROCEDURE — 83010 ASSAY OF HAPTOGLOBIN QUANT: CPT

## 2020-08-20 PROCEDURE — 27100171 HC OXYGEN HIGH FLOW UP TO 24 HOURS

## 2020-08-20 PROCEDURE — 84100 ASSAY OF PHOSPHORUS: CPT

## 2020-08-20 PROCEDURE — 84484 ASSAY OF TROPONIN QUANT: CPT

## 2020-08-20 PROCEDURE — 85379 FIBRIN DEGRADATION QUANT: CPT

## 2020-08-20 PROCEDURE — 99291 CRITICAL CARE FIRST HOUR: CPT | Mod: ,,, | Performed by: NURSE PRACTITIONER

## 2020-08-20 PROCEDURE — 63600175 PHARM REV CODE 636 W HCPCS: Performed by: NURSE PRACTITIONER

## 2020-08-20 PROCEDURE — 99900035 HC TECH TIME PER 15 MIN (STAT)

## 2020-08-20 PROCEDURE — 99291 PR CRITICAL CARE, E/M 30-74 MINUTES: ICD-10-PCS | Mod: ,,, | Performed by: NURSE PRACTITIONER

## 2020-08-20 PROCEDURE — 25000242 PHARM REV CODE 250 ALT 637 W/ HCPCS: Performed by: NURSE PRACTITIONER

## 2020-08-20 PROCEDURE — 25000003 PHARM REV CODE 250: Performed by: INTERNAL MEDICINE

## 2020-08-20 PROCEDURE — 83735 ASSAY OF MAGNESIUM: CPT

## 2020-08-20 PROCEDURE — 94640 AIRWAY INHALATION TREATMENT: CPT

## 2020-08-20 PROCEDURE — 85025 COMPLETE CBC W/AUTO DIFF WBC: CPT

## 2020-08-20 PROCEDURE — 25000003 PHARM REV CODE 250: Performed by: NURSE PRACTITIONER

## 2020-08-20 RX ORDER — CARVEDILOL 25 MG/1
25 TABLET ORAL ONCE
Status: COMPLETED | OUTPATIENT
Start: 2020-08-20 | End: 2020-08-20

## 2020-08-20 RX ORDER — CARVEDILOL 25 MG/1
25 TABLET ORAL 2 TIMES DAILY
Status: DISCONTINUED | OUTPATIENT
Start: 2020-08-20 | End: 2020-08-24 | Stop reason: HOSPADM

## 2020-08-20 RX ORDER — FUROSEMIDE 10 MG/ML
60 INJECTION INTRAMUSCULAR; INTRAVENOUS ONCE
Status: COMPLETED | OUTPATIENT
Start: 2020-08-20 | End: 2020-08-20

## 2020-08-20 RX ORDER — IPRATROPIUM BROMIDE AND ALBUTEROL SULFATE 2.5; .5 MG/3ML; MG/3ML
3 SOLUTION RESPIRATORY (INHALATION) EVERY 6 HOURS PRN
Status: DISCONTINUED | OUTPATIENT
Start: 2020-08-20 | End: 2020-08-24 | Stop reason: HOSPADM

## 2020-08-20 RX ORDER — AMLODIPINE BESYLATE 10 MG/1
10 TABLET ORAL DAILY
Status: DISCONTINUED | OUTPATIENT
Start: 2020-08-20 | End: 2020-08-24 | Stop reason: HOSPADM

## 2020-08-20 RX ORDER — INSULIN ASPART 100 [IU]/ML
1-10 INJECTION, SOLUTION INTRAVENOUS; SUBCUTANEOUS
Status: DISCONTINUED | OUTPATIENT
Start: 2020-08-20 | End: 2020-08-21

## 2020-08-20 RX ORDER — IBUPROFEN 200 MG
16 TABLET ORAL
Status: DISCONTINUED | OUTPATIENT
Start: 2020-08-20 | End: 2020-08-24 | Stop reason: HOSPADM

## 2020-08-20 RX ORDER — CARVEDILOL 12.5 MG/1
12.5 TABLET ORAL 2 TIMES DAILY
Status: DISCONTINUED | OUTPATIENT
Start: 2020-08-20 | End: 2020-08-20

## 2020-08-20 RX ORDER — IPRATROPIUM BROMIDE AND ALBUTEROL SULFATE 2.5; .5 MG/3ML; MG/3ML
3 SOLUTION RESPIRATORY (INHALATION) 2 TIMES DAILY
Status: DISCONTINUED | OUTPATIENT
Start: 2020-08-20 | End: 2020-08-24

## 2020-08-20 RX ORDER — IBUPROFEN 200 MG
24 TABLET ORAL
Status: DISCONTINUED | OUTPATIENT
Start: 2020-08-20 | End: 2020-08-24 | Stop reason: HOSPADM

## 2020-08-20 RX ORDER — HYDRALAZINE HYDROCHLORIDE 20 MG/ML
10 INJECTION INTRAMUSCULAR; INTRAVENOUS EVERY 6 HOURS PRN
Status: DISCONTINUED | OUTPATIENT
Start: 2020-08-20 | End: 2020-08-24 | Stop reason: HOSPADM

## 2020-08-20 RX ORDER — GLUCAGON 1 MG
1 KIT INJECTION
Status: DISCONTINUED | OUTPATIENT
Start: 2020-08-20 | End: 2020-08-24 | Stop reason: HOSPADM

## 2020-08-20 RX ADMIN — DOXAZOSIN 2 MG: 1 TABLET ORAL at 08:08

## 2020-08-20 RX ADMIN — DEXAMETHASONE SODIUM PHOSPHATE 6 MG: 4 INJECTION, SOLUTION INTRAMUSCULAR; INTRAVENOUS at 08:08

## 2020-08-20 RX ADMIN — SODIUM CHLORIDE 7 UNITS/HR: 9 INJECTION, SOLUTION INTRAVENOUS at 01:08

## 2020-08-20 RX ADMIN — IPRATROPIUM BROMIDE AND ALBUTEROL SULFATE 3 ML: .5; 2.5 SOLUTION RESPIRATORY (INHALATION) at 08:08

## 2020-08-20 RX ADMIN — SODIUM ZIRCONIUM CYCLOSILICATE 10 G: 10 POWDER, FOR SUSPENSION ORAL at 04:08

## 2020-08-20 RX ADMIN — CARVEDILOL 25 MG: 25 TABLET, FILM COATED ORAL at 10:08

## 2020-08-20 RX ADMIN — FUROSEMIDE 60 MG: 10 INJECTION, SOLUTION INTRAMUSCULAR; INTRAVENOUS at 10:08

## 2020-08-20 RX ADMIN — CARVEDILOL 25 MG: 25 TABLET, FILM COATED ORAL at 08:08

## 2020-08-20 RX ADMIN — HEPARIN SODIUM 12 UNITS/KG/HR: 5000 INJECTION INTRAVENOUS; SUBCUTANEOUS at 05:08

## 2020-08-20 RX ADMIN — INSULIN DETEMIR 8 UNITS: 100 INJECTION, SOLUTION SUBCUTANEOUS at 11:08

## 2020-08-20 RX ADMIN — CARVEDILOL 6.25 MG: 6.25 TABLET, FILM COATED ORAL at 08:08

## 2020-08-20 RX ADMIN — SODIUM CHLORIDE 5.75 UNITS/HR: 9 INJECTION, SOLUTION INTRAVENOUS at 12:08

## 2020-08-20 RX ADMIN — AMLODIPINE BESYLATE 10 MG: 10 TABLET ORAL at 10:08

## 2020-08-20 RX ADMIN — CEFTRIAXONE 2 G: 2 INJECTION, SOLUTION INTRAVENOUS at 08:08

## 2020-08-20 RX ADMIN — AZITHROMYCIN MONOHYDRATE 500 MG: 500 INJECTION, POWDER, LYOPHILIZED, FOR SOLUTION INTRAVENOUS at 04:08

## 2020-08-20 NOTE — SUBJECTIVE & OBJECTIVE
Past Medical History:   Diagnosis Date    Diabetes mellitus, type 2     Hyperlipidemia     Hypertension 2004       No past surgical history on file.    Review of patient's allergies indicates:  No Known Allergies    Family History     Problem Relation (Age of Onset)    Diabetes Mother, Father    Hyperlipidemia Mother, Father        Tobacco Use    Smoking status: Never Smoker    Smokeless tobacco: Never Used   Substance and Sexual Activity    Alcohol use: No    Drug use: No    Sexual activity: Yes     Partners: Female      Review of Systems   Constitutional: Positive for activity change and fatigue. Negative for appetite change, chills and fever.   HENT: Negative for congestion.    Respiratory: Positive for cough and shortness of breath. Negative for chest tightness.    Cardiovascular: Negative for chest pain and leg swelling.   Gastrointestinal: Negative for abdominal pain, diarrhea, nausea and vomiting.   Genitourinary: Negative for dysuria, frequency and urgency.   Musculoskeletal: Negative for arthralgias, myalgias, neck pain and neck stiffness.   Skin: Negative for color change and pallor.   Neurological: Negative for dizziness, weakness, light-headedness and headaches.   Psychiatric/Behavioral: Negative for agitation and confusion.     Objective:     Vital Signs (Most Recent):  Temp: 98.8 °F (37.1 °C) (08/19/20 1900)  Pulse: (!) 128 (08/19/20 2030)  Resp: 18 (08/19/20 1747)  BP: (!) 167/81 (08/19/20 2030)  SpO2: 97 % (08/19/20 2030) Vital Signs (24h Range):  Temp:  [98.3 °F (36.8 °C)-99 °F (37.2 °C)] 98.8 °F (37.1 °C)  Pulse:  [103-140] 128  Resp:  [18-55] 18  SpO2:  [77 %-100 %] 97 %  BP: (166-241)/() 167/81   Weight: 72.6 kg (160 lb)  Body mass index is 24.33 kg/m².      Intake/Output Summary (Last 24 hours) at 8/19/2020 2140  Last data filed at 8/19/2020 1900  Gross per 24 hour   Intake 750 ml   Output 410 ml   Net 340 ml       Physical Exam  Constitutional:       Appearance: He is not  toxic-appearing.   HENT:      Head: Normocephalic and atraumatic.      Comments: On Com lisbeth  Neck:      Musculoskeletal: Normal range of motion and neck supple.   Cardiovascular:      Rate and Rhythm: Tachycardia present.      Heart sounds: No murmur.   Pulmonary:      Breath sounds: No wheezing or rales.      Comments: Reduced breath sounds bilaterally  Abdominal:      General: Abdomen is flat. There is no distension.      Palpations: Abdomen is soft.      Tenderness: There is no abdominal tenderness.   Musculoskeletal: Normal range of motion.         General: No swelling.   Skin:     General: Skin is warm and dry.   Neurological:      Mental Status: He is alert.         Vents:  Oxygen Concentration (%): 100 (08/19/20 1935)  Lines/Drains/Airways     Peripheral Intravenous Line                 Peripheral IV - Single Lumen 08/19/20 1335 20 G Right Antecubital less than 1 day         Peripheral IV - Single Lumen 08/19/20 1745 20 G Left Upper Arm less than 1 day              Significant Labs:    CBC/Anemia Profile:  Recent Labs   Lab 08/19/20  1336 08/19/20  1611 08/19/20  1944   WBC 7.38  --  9.87   HGB 7.3*  --  9.0*   HCT 22.6*  --  27.2*     --  253   MCV 94  --  94   RDW 11.8  --  11.9   IRON  --  27*  --    FERRITIN 3,845*  --   --    RETIC  --  0.9  --         Chemistries:  Recent Labs   Lab 08/19/20  1336      K 5.4*      CO2 20*   BUN 31*   CREATININE 2.5*   CALCIUM 9.3   ALBUMIN 3.1*   PROT 7.4   BILITOT 0.9   ALKPHOS 235*   *   *

## 2020-08-20 NOTE — HPI
Filippo Martinez is a 65 y.o. male with past medical history of HTN, DM2, CKD, who [place] [facility] [place] breath. Patient speaks Latvian Creole and history obtained with the assistance of translation services. Patient states that he started feeling unwell on Friday with progressive shortness of breath, dry cough, and generalized malaise. The shortness of breath was severe today and so he presented to ED. He denies known sick contacts, fever, chest pain, leg swelling, headache. He denies bleeding. He denies blurry vision. Of note he recently got cataract surgery 10 days ago, but was reportedly covid negative at that time. On arrival in ED patient was saturating 61% on room air and was placed on non-rebreather with improvement in saturating to above 90%. He was tachycardic and tachypnic to 30s and , BP elevated 220/110. He was found to be COVID +. CXR was obtained which revealed diffuse infiltrates bilaterally. Labs significant for Hb 7 (most recently Hb 12 in 2017), Cr 2.5 (uncertain baseline, most recently 1.0 2017), BUN 31, Bicarb 20, K 5.4, Trop 0.03, D-dimer 3.48. ABG pH 7.4, pCO2 27, pO2 55 on 50% NRB. EKG with significant LVH. He was started on dexamethasone, Lovenox 40, and given rocephin and azithromycin. He was subsequently transferred to [facility].

## 2020-08-20 NOTE — ASSESSMENT & PLAN NOTE
COVID-19 Positive  65 y.o. male presented to [facility] ED with shortness of breath, saturating 61% on room air and was placed on non-rebreather with improvement in saturating to above 90%. COVID +. CXR revealed diffuse infiltrates bilaterally.  - Labs significant for Hb 7, Cr 2.5, BUN 31, Bicarb 20, K 5.4, Trop 0.03, D-dimer 3.48.  Procal elevated  - ABG pH 7.4, pCO2 27, pO2 55 on 50% NRB. started on dexamethasone, Lovenox 40, and given rocephin and azithromycin    Plan:  - Close monitoring respiratory status  - Continue rocephin/azithromycin for now  - Continue decadron 6 mg IV daily  - D-dimer elevated. Unable to obtain CTA due to renal function. Started heparin gtt for possible PE.  - BP elevated 200s SBP: Evidence of end-organ damage on labs/symptoms vs secondary to covid. Noted LVH on EKG. Started cardene gtt

## 2020-08-20 NOTE — H&P
[facility] - ICU 16   Critical Care Medicine  History & Physical    Patient Name: Filippo Martinez  MRN: [id number]  Admission Date: 8/19/2020  Hospital Length of Stay: 0 days  Code Status: Full Code  Attending Physician: Juan Antonio Hurtado MD   Primary Care Provider: No primary care provider on file.   Principal Problem: Acute respiratory failure with hypoxia    Subjective:     HPI:  Filippo Martinez is a 65 y.o. male with past medical history of HTN, DM2, CKD, who presented to [facility] ED with shortness of breath. Patient speaks Tristanian Creole and history obtained with the assistance of translation services. Patient states that he started feeling unwell on Friday with progressive shortness of breath, dry cough, and generalized malaise. The shortness of breath was severe today and so he presented to ED. He denies known sick contacts, fever, chest pain, leg swelling, headache. He denies bleeding. He denies blurry vision. Of note he recently got cataract surgery 10 days ago, but was reportedly covid negative at that time. On arrival in ED patient was saturating 61% on room air and was placed on non-rebreather with improvement in saturating to above 90%. He was tachycardic and tachypnic to 30s and , BP elevated 220/110. He was found to be COVID +. CXR was obtained which revealed diffuse infiltrates bilaterally. Labs significant for Hb 7 (most recently Hb 12 in 2017), Cr 2.5 (uncertain baseline, most recently 1.0 2017), BUN 31, Bicarb 20, K 5.4, Trop 0.03, D-dimer 3.48. ABG pH 7.4, pCO2 27, pO2 55 on 50% NRB. EKG with significant LVH. He was started on dexamethasone, Lovenox 40, and given rocephin and azithromycin. He was subsequently transferred to [facility].     Hospital/ICU Course:  No notes on file     Past Medical History:   Diagnosis Date    Diabetes mellitus, type 2     Hyperlipidemia     Hypertension 2004       No past surgical history on file.    Review of patient's allergies indicates:  No Known  Allergies    Family History     Problem Relation (Age of Onset)    Diabetes Mother, Father    Hyperlipidemia Mother, Father        Tobacco Use    Smoking status: Never Smoker    Smokeless tobacco: Never Used   Substance and Sexual Activity    Alcohol use: No    Drug use: No    Sexual activity: Yes     Partners: Female      Review of Systems   Constitutional: Positive for activity change and fatigue. Negative for appetite change, chills and fever.   HENT: Negative for congestion.    Respiratory: Positive for cough and shortness of breath. Negative for chest tightness.    Cardiovascular: Negative for chest pain and leg swelling.   Gastrointestinal: Negative for abdominal pain, diarrhea, nausea and vomiting.   Genitourinary: Negative for dysuria, frequency and urgency.   Musculoskeletal: Negative for arthralgias, myalgias, neck pain and neck stiffness.   Skin: Negative for color change and pallor.   Neurological: Negative for dizziness, weakness, light-headedness and headaches.   Psychiatric/Behavioral: Negative for agitation and confusion.     Objective:     Vital Signs (Most Recent):  Temp: 98.8 °F (37.1 °C) (08/19/20 1900)  Pulse: (!) 128 (08/19/20 2030)  Resp: 18 (08/19/20 1747)  BP: (!) 167/81 (08/19/20 2030)  SpO2: 97 % (08/19/20 2030) Vital Signs (24h Range):  Temp:  [98.3 °F (36.8 °C)-99 °F (37.2 °C)] 98.8 °F (37.1 °C)  Pulse:  [103-140] 128  Resp:  [18-55] 18  SpO2:  [77 %-100 %] 97 %  BP: (166-241)/() 167/81   Weight: 72.6 kg (160 lb)  Body mass index is 24.33 kg/m².      Intake/Output Summary (Last 24 hours) at 8/19/2020 2140  Last data filed at 8/19/2020 1900  Gross per 24 hour   Intake 750 ml   Output 410 ml   Net 340 ml       Physical Exam  Constitutional:       Appearance: He is not toxic-appearing.   HENT:      Head: Normocephalic and atraumatic.      Comments: On Com lisbeth  Neck:      Musculoskeletal: Normal range of motion and neck supple.   Cardiovascular:      Rate and Rhythm: Tachycardia  present.      Heart sounds: No murmur.   Pulmonary:      Breath sounds: No wheezing or rales.      Comments: Reduced breath sounds bilaterally  Abdominal:      General: Abdomen is flat. There is no distension.      Palpations: Abdomen is soft.      Tenderness: There is no abdominal tenderness.   Musculoskeletal: Normal range of motion.         General: No swelling.   Skin:     General: Skin is warm and dry.   Neurological:      Mental Status: He is alert.         Vents:  Oxygen Concentration (%): 100 (08/19/20 1935)  Lines/Drains/Airways     Peripheral Intravenous Line                 Peripheral IV - Single Lumen 08/19/20 1335 20 G Right Antecubital less than 1 day         Peripheral IV - Single Lumen 08/19/20 1745 20 G Left Upper Arm less than 1 day              Significant Labs:    CBC/Anemia Profile:  Recent Labs   Lab 08/19/20  1336 08/19/20  1611 08/19/20  1944   WBC 7.38  --  9.87   HGB 7.3*  --  9.0*   HCT 22.6*  --  27.2*     --  253   MCV 94  --  94   RDW 11.8  --  11.9   IRON  --  27*  --    FERRITIN 3,845*  --   --    RETIC  --  0.9  --         Chemistries:  Recent Labs   Lab 08/19/20  1336      K 5.4*      CO2 20*   BUN 31*   CREATININE 2.5*   CALCIUM 9.3   ALBUMIN 3.1*   PROT 7.4   BILITOT 0.9   ALKPHOS 235*   *   *         Assessment/Plan:     Pulmonary  * Acute respiratory failure with hypoxia  COVID-19 Positive  65 y.o. male presented to [facility] ED with shortness of breath, saturating 61% on room air and was placed on non-rebreather with improvement in saturating to above 90%. COVID +. CXR revealed diffuse infiltrates bilaterally.  - Labs significant for Hb 7, Cr 2.5, BUN 31, Bicarb 20, K 5.4, Trop 0.03, D-dimer 3.48.  Procal elevated  - ABG pH 7.4, pCO2 27, pO2 55 on 50% NRB. started on dexamethasone, Lovenox 40, and given rocephin and azithromycin    Plan:  - Close monitoring respiratory status  - Continue rocephin/azithromycin for now  - Continue decadron 6 mg IV  daily  - D-dimer elevated. Unable to obtain CTA due to renal function. Started heparin gtt for possible PE.  - BP elevated 200s SBP: Evidence of end-organ damage on labs/symptoms vs secondary to covid. Noted LVH on EKG. Started cardene gtt     Cardiac/Vascular  Essential hypertension  Reports compliance with home anti-hypertensives, BPs at home 130-160 SBP  See respiratory failure    Oncology  Anemia  Hb 7 from unclear baseline - last lab 2017 Hb 12  No overt blood loss  Hemolysis w/u ongoing - Retic, LDH,. Hapto, T.B.    Endocrine  Type 2 diabetes mellitus, uncontrolled  On oral agents  POCT glucose q6h  LDSSI        Critical Care Daily Checklist:    A: Awake: RASS Goal/Actual Goal:    Actual:     B: Spontaneous Breathing Trial Performed?     C: SAT & SBT Coordinated?  na                      D: Delirium: CAM-ICU     E: Early Mobility Performed? Yes   F: Feeding Goal:    Status:     Current Diet Order   Procedures    Diet NPO      AS: Analgesia/Sedation none   T: Thromboembolic Prophylaxis heparin   H: HOB > 300 Yes   U: Stress Ulcer Prophylaxis (if needed) na   G: Glucose Control SSI   B: Bowel Function     I: Indwelling Catheter (Lines & Renteria) Necessity    D: De-escalation of Antimicrobials/Pharmacotherapies Rocephin  azithromycin    Plan for the day/ETD     Code Status:  Family/Goals of Care: Full Code         Critical secondary to Patient has a condition that poses threat to life and bodily function: Severe Respiratory Distress     Critical care was time spent personally by me on the following activities: development of treatment plan with patient or surrogate and bedside caregivers, discussions with consultants, evaluation of patient's response to treatment, examination of patient, ordering and performing treatments and interventions, ordering and review of laboratory studies, ordering and review of radiographic studies, pulse oximetry, re-evaluation of patient's condition. This critical care time did not  overlap with that of any other provider or involve time for any procedures.     Wing Paul MD  Critical Care Medicine  [facility] - ICU [facility]

## 2020-08-20 NOTE — ASSESSMENT & PLAN NOTE
Hb 7 from unclear baseline - last lab 2017 Hb 12  No overt blood loss  Hemolysis w/u ongoing - Retic, LDH,. Hapto, T.B.

## 2020-08-20 NOTE — PLAN OF CARE
CM attempted to contact pt and pt family using contact phone numbers listed.  CM will attempt again.    Amber Buckley RN CM  [id number]       08/20/20 1608   Discharge Assessment   Assessment Type Discharge Planning Assessment   Confirmed/corrected address and phone number on facesheet?   (Unable to get in touch with pt or pt's family using the contact phone numbers listed.  CM will attempt again.)   Expected Length of Stay (days) 5   Readmission Within the Last 30 Days current reason for admission unrelated to previous admission   Patient currently being followed by outpatient case management? Unable to determine (comments)   DME Needed Upon Discharge  other (see comments)  (TBD)   Readmission Questionnaire   At the time of your discharge, did someone talk to you about what your health problems were? Yes   At the time of discharge, did someone talk to you about what to watch out for regarding worsening of your health problem? Yes   At the time of discharge, did someone talk to you about what to do if you experienced worsening of your health problem? Yes   At the time of discharge, did someone talk to you about which medication to take when you left the hospital and which ones to stop taking? Yes   At the time of discharge, did someone talk to you about when and where to follow up with a doctor after you left the hospital? Yes

## 2020-08-20 NOTE — PLAN OF CARE
Problem: Diabetes Comorbidity  Goal: Blood Glucose Level Within Desired Range  Outcome: Ongoing, Progressing     Problem: Fall Injury Risk  Goal: Absence of Fall and Fall-Related Injury  Outcome: Met     Problem: Adult Inpatient Plan of Care  Goal: Plan of Care Review  Outcome: Met  Goal: Patient-Specific Goal (Individualization)  Outcome: Met  Goal: Absence of Hospital-Acquired Illness or Injury  Outcome: Met   Nicardipine dtt off, oral antihypertensives started. Remains on comfort flow 30L 80%. Insulin dtt started for hyperglycemia, hyperkalemia treated as well. Otherwise stable.

## 2020-08-20 NOTE — EICU
eICU Note :    Called by the [facility]:    Problem: K 5.9, Glucose 500  Starting Insulin GTT now      Pertinent History and labs reviewed : 64 y/o    Acute respiratory failure with hypoxia    Essential hypertension    Type 2 diabetes mellitus, uncontrolled    COVID-19    Anemia        Treatment /Intervention given: Hyperkalemia Protocol ordered        Tere BennettU Physician

## 2020-08-21 PROBLEM — R73.9 STEROID-INDUCED HYPERGLYCEMIA: Status: ACTIVE | Noted: 2020-08-21

## 2020-08-21 PROBLEM — T38.0X5A STEROID-INDUCED HYPERGLYCEMIA: Status: ACTIVE | Noted: 2020-08-21

## 2020-08-21 LAB
ALBUMIN SERPL BCP-MCNC: 2.7 G/DL (ref 3.5–5.2)
ALP SERPL-CCNC: 199 U/L (ref 55–135)
ALT SERPL W/O P-5'-P-CCNC: 97 U/L (ref 10–44)
ANION GAP SERPL CALC-SCNC: 11 MMOL/L (ref 8–16)
ANION GAP SERPL CALC-SCNC: 15 MMOL/L (ref 8–16)
ANISOCYTOSIS BLD QL SMEAR: SLIGHT
AST SERPL-CCNC: 70 U/L (ref 10–40)
BASOPHILS NFR BLD: 0 % (ref 0–1.9)
BILIRUB SERPL-MCNC: 0.4 MG/DL (ref 0.1–1)
BUN SERPL-MCNC: 47 MG/DL (ref 8–23)
BUN SERPL-MCNC: 57 MG/DL (ref 8–23)
CALCIUM SERPL-MCNC: 9.1 MG/DL (ref 8.7–10.5)
CALCIUM SERPL-MCNC: 9.3 MG/DL (ref 8.7–10.5)
CHLORIDE SERPL-SCNC: 101 MMOL/L (ref 95–110)
CHLORIDE SERPL-SCNC: 106 MMOL/L (ref 95–110)
CO2 SERPL-SCNC: 20 MMOL/L (ref 23–29)
CO2 SERPL-SCNC: 22 MMOL/L (ref 23–29)
CREAT SERPL-MCNC: 2.3 MG/DL (ref 0.5–1.4)
CREAT SERPL-MCNC: 2.6 MG/DL (ref 0.5–1.4)
DIFFERENTIAL METHOD: ABNORMAL
EOSINOPHIL NFR BLD: 1 % (ref 0–8)
ERYTHROCYTE [DISTWIDTH] IN BLOOD BY AUTOMATED COUNT: 12.2 % (ref 11.5–14.5)
EST. GFR  (AFRICAN AMERICAN): 28.6 ML/MIN/1.73 M^2
EST. GFR  (AFRICAN AMERICAN): 33.2 ML/MIN/1.73 M^2
EST. GFR  (NON AFRICAN AMERICAN): 24.8 ML/MIN/1.73 M^2
EST. GFR  (NON AFRICAN AMERICAN): 28.7 ML/MIN/1.73 M^2
FACT X PPP CHRO-ACNC: 0.14 IU/ML (ref 0.3–0.7)
FACT X PPP CHRO-ACNC: 0.36 IU/ML (ref 0.3–0.7)
FACT X PPP CHRO-ACNC: 0.55 IU/ML (ref 0.3–0.7)
GIANT PLATELETS BLD QL SMEAR: PRESENT
GLUCOSE SERPL-MCNC: 231 MG/DL (ref 70–110)
GLUCOSE SERPL-MCNC: 544 MG/DL (ref 70–110)
HCT VFR BLD AUTO: 22.2 % (ref 40–54)
HGB BLD-MCNC: 7.1 G/DL (ref 14–18)
IMM GRANULOCYTES # BLD AUTO: ABNORMAL K/UL (ref 0–0.04)
IMM GRANULOCYTES NFR BLD AUTO: ABNORMAL % (ref 0–0.5)
LYMPHOCYTES NFR BLD: 8 % (ref 18–48)
MAGNESIUM SERPL-MCNC: 2.3 MG/DL (ref 1.6–2.6)
MCH RBC QN AUTO: 30.3 PG (ref 27–31)
MCHC RBC AUTO-ENTMCNC: 32 G/DL (ref 32–36)
MCV RBC AUTO: 95 FL (ref 82–98)
MONOCYTES NFR BLD: 8 % (ref 4–15)
NEUTROPHILS NFR BLD: 82 % (ref 38–73)
NEUTS BAND NFR BLD MANUAL: 1 %
NRBC BLD-RTO: 0 /100 WBC
PHOSPHATE SERPL-MCNC: 5.1 MG/DL (ref 2.7–4.5)
PLATELET # BLD AUTO: 298 K/UL (ref 150–350)
PLATELET BLD QL SMEAR: ABNORMAL
PMV BLD AUTO: 13 FL (ref 9.2–12.9)
POCT GLUCOSE: 240 MG/DL (ref 70–110)
POCT GLUCOSE: 248 MG/DL (ref 70–110)
POCT GLUCOSE: 253 MG/DL (ref 70–110)
POCT GLUCOSE: 272 MG/DL (ref 70–110)
POCT GLUCOSE: 275 MG/DL (ref 70–110)
POCT GLUCOSE: 278 MG/DL (ref 70–110)
POCT GLUCOSE: 291 MG/DL (ref 70–110)
POCT GLUCOSE: 299 MG/DL (ref 70–110)
POCT GLUCOSE: 357 MG/DL (ref 70–110)
POCT GLUCOSE: 383 MG/DL (ref 70–110)
POCT GLUCOSE: 450 MG/DL (ref 70–110)
POCT GLUCOSE: 474 MG/DL (ref 70–110)
POCT GLUCOSE: 485 MG/DL (ref 70–110)
POCT GLUCOSE: 486 MG/DL (ref 70–110)
POCT GLUCOSE: >500 MG/DL (ref 70–110)
POTASSIUM SERPL-SCNC: 4.5 MMOL/L (ref 3.5–5.1)
POTASSIUM SERPL-SCNC: 5.7 MMOL/L (ref 3.5–5.1)
PROT SERPL-MCNC: 7.9 G/DL (ref 6–8.4)
RBC # BLD AUTO: 2.34 M/UL (ref 4.6–6.2)
SODIUM SERPL-SCNC: 136 MMOL/L (ref 136–145)
SODIUM SERPL-SCNC: 139 MMOL/L (ref 136–145)
WBC # BLD AUTO: 10.09 K/UL (ref 3.9–12.7)

## 2020-08-21 PROCEDURE — 85520 HEPARIN ASSAY: CPT

## 2020-08-21 PROCEDURE — 83735 ASSAY OF MAGNESIUM: CPT

## 2020-08-21 PROCEDURE — 99233 SBSQ HOSP IP/OBS HIGH 50: CPT | Mod: ,,, | Performed by: NURSE PRACTITIONER

## 2020-08-21 PROCEDURE — 63600175 PHARM REV CODE 636 W HCPCS: Performed by: NURSE PRACTITIONER

## 2020-08-21 PROCEDURE — 20000000 HC ICU ROOM

## 2020-08-21 PROCEDURE — 94761 N-INVAS EAR/PLS OXIMETRY MLT: CPT

## 2020-08-21 PROCEDURE — 85007 BL SMEAR W/DIFF WBC COUNT: CPT

## 2020-08-21 PROCEDURE — 85520 HEPARIN ASSAY: CPT | Mod: 91

## 2020-08-21 PROCEDURE — 25000003 PHARM REV CODE 250: Performed by: NURSE PRACTITIONER

## 2020-08-21 PROCEDURE — 80053 COMPREHEN METABOLIC PANEL: CPT

## 2020-08-21 PROCEDURE — 27100171 HC OXYGEN HIGH FLOW UP TO 24 HOURS

## 2020-08-21 PROCEDURE — 99900035 HC TECH TIME PER 15 MIN (STAT)

## 2020-08-21 PROCEDURE — 25000242 PHARM REV CODE 250 ALT 637 W/ HCPCS: Performed by: NURSE PRACTITIONER

## 2020-08-21 PROCEDURE — 94640 AIRWAY INHALATION TREATMENT: CPT

## 2020-08-21 PROCEDURE — 85027 COMPLETE CBC AUTOMATED: CPT

## 2020-08-21 PROCEDURE — 25000003 PHARM REV CODE 250: Performed by: INTERNAL MEDICINE

## 2020-08-21 PROCEDURE — 25000003 PHARM REV CODE 250: Performed by: STUDENT IN AN ORGANIZED HEALTH CARE EDUCATION/TRAINING PROGRAM

## 2020-08-21 PROCEDURE — 27000646 HC AEROBIKA DEVICE

## 2020-08-21 PROCEDURE — 80048 BASIC METABOLIC PNL TOTAL CA: CPT

## 2020-08-21 PROCEDURE — 97162 PT EVAL MOD COMPLEX 30 MIN: CPT

## 2020-08-21 PROCEDURE — 94664 DEMO&/EVAL PT USE INHALER: CPT

## 2020-08-21 PROCEDURE — 97165 OT EVAL LOW COMPLEX 30 MIN: CPT

## 2020-08-21 PROCEDURE — 84100 ASSAY OF PHOSPHORUS: CPT

## 2020-08-21 PROCEDURE — 99233 PR SUBSEQUENT HOSPITAL CARE,LEVL III: ICD-10-PCS | Mod: ,,, | Performed by: NURSE PRACTITIONER

## 2020-08-21 PROCEDURE — 27000221 HC OXYGEN, UP TO 24 HOURS

## 2020-08-21 RX ORDER — FUROSEMIDE 10 MG/ML
60 INJECTION INTRAMUSCULAR; INTRAVENOUS ONCE
Status: COMPLETED | OUTPATIENT
Start: 2020-08-21 | End: 2020-08-21

## 2020-08-21 RX ORDER — INSULIN ASPART 100 [IU]/ML
2 INJECTION, SOLUTION INTRAVENOUS; SUBCUTANEOUS
Status: DISCONTINUED | OUTPATIENT
Start: 2020-08-21 | End: 2020-08-21

## 2020-08-21 RX ORDER — FUROSEMIDE 10 MG/ML
60 INJECTION INTRAMUSCULAR; INTRAVENOUS DAILY
Status: DISCONTINUED | OUTPATIENT
Start: 2020-08-22 | End: 2020-08-23

## 2020-08-21 RX ORDER — INSULIN ASPART 100 [IU]/ML
1-10 INJECTION, SOLUTION INTRAVENOUS; SUBCUTANEOUS
Status: DISCONTINUED | OUTPATIENT
Start: 2020-08-21 | End: 2020-08-21

## 2020-08-21 RX ADMIN — FUROSEMIDE 60 MG: 10 INJECTION, SOLUTION INTRAMUSCULAR; INTRAVENOUS at 09:08

## 2020-08-21 RX ADMIN — HEPARIN SODIUM 15 UNITS/KG/HR: 5000 INJECTION INTRAVENOUS; SUBCUTANEOUS at 04:08

## 2020-08-21 RX ADMIN — IPRATROPIUM BROMIDE AND ALBUTEROL SULFATE 3 ML: .5; 2.5 SOLUTION RESPIRATORY (INHALATION) at 08:08

## 2020-08-21 RX ADMIN — SODIUM CHLORIDE 7 UNITS/HR: 9 INJECTION, SOLUTION INTRAVENOUS at 03:08

## 2020-08-21 RX ADMIN — CEFTRIAXONE 2 G: 2 INJECTION, SOLUTION INTRAVENOUS at 09:08

## 2020-08-21 RX ADMIN — CARVEDILOL 25 MG: 25 TABLET, FILM COATED ORAL at 09:08

## 2020-08-21 RX ADMIN — DEXAMETHASONE SODIUM PHOSPHATE 6 MG: 4 INJECTION, SOLUTION INTRAMUSCULAR; INTRAVENOUS at 09:08

## 2020-08-21 RX ADMIN — HYDRALAZINE HYDROCHLORIDE 10 MG: 20 INJECTION INTRAMUSCULAR; INTRAVENOUS at 05:08

## 2020-08-21 RX ADMIN — AZITHROMYCIN MONOHYDRATE 500 MG: 500 INJECTION, POWDER, LYOPHILIZED, FOR SOLUTION INTRAVENOUS at 04:08

## 2020-08-21 RX ADMIN — AMLODIPINE BESYLATE 10 MG: 10 TABLET ORAL at 09:08

## 2020-08-21 RX ADMIN — DOXAZOSIN 2 MG: 1 TABLET ORAL at 09:08

## 2020-08-21 NOTE — NURSING
Patient refuse to be finger stick every hour, complaining that it hurts and don't want to be stick too frequently, however patient agree to finger stick every 2 hours to give a little break in between accuchecks.

## 2020-08-21 NOTE — PROGRESS NOTES
[facility] - ICU 16   Critical Care Medicine  Progress Note    Patient Name: Filippo Martinez  MRN: [id number]  Admission Date: 8/19/2020  Hospital Length of Stay: 2 days  Code Status: Full Code  Attending Provider: Juan Antonio Hurtado MD  Primary Care Provider: Rohan White MD   Principal Problem: Acute respiratory failure with hypoxia    Subjective:     HPI:  Filippo Martinez is a 65 y.o. male with past medical history of HTN, DM2, CKD, who presented to [facility] ED with shortness of breath. Patient speaks Mongolian Creole and history obtained with the assistance of translation services. Patient states that he started feeling unwell on Friday with progressive shortness of breath, dry cough, and generalized malaise. The shortness of breath was severe today and so he presented to ED. He denies known sick contacts, fever, chest pain, leg swelling, headache. He denies bleeding. He denies blurry vision. Of note he recently got cataract surgery 10 days ago, but was reportedly covid negative at that time. On arrival in ED patient was saturating 61% on room air and was placed on non-rebreather with improvement in saturating to above 90%. He was tachycardic and tachypnic to 30s and , BP elevated 220/110. He was found to be COVID +. CXR was obtained which revealed diffuse infiltrates bilaterally. Labs significant for Hb 7 (most recently Hb 12 in 2017), Cr 2.5 (uncertain baseline, most recently 1.0 2017), BUN 31, Bicarb 20, K 5.4, Trop 0.03, D-dimer 3.48. ABG pH 7.4, pCO2 27, pO2 55 on 50% NRB. EKG with significant LVH. He was started on dexamethasone, Lovenox 40, and given rocephin and azithromycin. He was subsequently transferred to [facility] [place] [facility].     Hospital/ICU Course:  Transferred from [facility] for hypoxic respiratory failure and hypertensive urgency, COVID +. Started on Cardene infusion and HFNC. Started on oral antihypertensives and weaned off Cardene. BP improved with PO medications and weaned down oxygen  following lasix administration. Echo ordered to eval cardiac function.    Interval History/Significant Events: Redose with lasix again today, oxygenation appears to continue to improve    Review of Systems   Constitutional: Positive for fatigue. Negative for fever.   HENT: Negative for congestion.    Respiratory: Positive for cough and shortness of breath. Negative for chest tightness.    Cardiovascular: Negative for chest pain and leg swelling.   Gastrointestinal: Negative for abdominal pain, diarrhea, nausea and vomiting.   Genitourinary: Negative for difficulty urinating, dysuria and frequency.   Musculoskeletal: Negative for back pain and myalgias.   Skin: Negative for color change and pallor.   Neurological: Negative for dizziness, weakness, light-headedness and headaches.   Psychiatric/Behavioral: Negative for agitation and confusion.     Objective:     Vital Signs (Most Recent):  Temp: 98.2 °F (36.8 °C) (08/20/20 1945)  Pulse: 81 (08/21/20 0820)  Resp: (!) 27 (08/21/20 0820)  BP: (!) 170/90 (08/21/20 0600)  SpO2: 100 % (08/21/20 0820) Vital Signs (24h Range):  Temp:  [97.6 °F (36.4 °C)-98.2 °F (36.8 °C)] 98.2 °F (36.8 °C)  Pulse:  [] 81  Resp:  [4-38] 27  SpO2:  [92 %-100 %] 100 %  BP: (132-206)/() 170/90   Weight: 72.6 kg (160 lb)  Body mass index is 24.33 kg/m².      Intake/Output Summary (Last 24 hours) at 8/21/2020 0828  Last data filed at 8/21/2020 0640  Gross per 24 hour   Intake 922.59 ml   Output 2860 ml   Net -1937.41 ml       Physical Exam  Vitals signs and nursing note reviewed.   Constitutional:       General: He is not in acute distress.     Appearance: Normal appearance. He is ill-appearing. He is not toxic-appearing or diaphoretic.   HENT:      Head: Normocephalic and atraumatic.   Eyes:      General: No scleral icterus.     Extraocular Movements:      Right eye: No nystagmus.      Left eye: No nystagmus.      Conjunctiva/sclera: Conjunctivae normal.      Right eye: No exudate or  hemorrhage.     Left eye: No exudate or hemorrhage.     Pupils: Pupils are equal, round, and reactive to light.   Neck:      Musculoskeletal: Normal range of motion and neck supple. No neck rigidity.      Trachea: Trachea normal. No tracheal deviation.   Cardiovascular:      Rate and Rhythm: Normal rate and regular rhythm.      Chest Wall: PMI is not displaced.      Pulses: Normal pulses.           Radial pulses are 2+ on the right side and 2+ on the left side.      Heart sounds: Normal heart sounds. No murmur. No friction rub. No gallop.       Comments: Warm extremities  Pulmonary:      Effort: Pulmonary effort is normal. No tachypnea, accessory muscle usage or respiratory distress.      Breath sounds: Normal breath sounds. No stridor. No decreased breath sounds, wheezing, rhonchi or rales.   Abdominal:      General: Abdomen is flat. Bowel sounds are normal. There is no distension.      Palpations: Abdomen is soft.      Tenderness: There is no abdominal tenderness.   Musculoskeletal: Normal range of motion.         General: No swelling.   Skin:     General: Skin is warm and dry.      Capillary Refill: Capillary refill takes 2 to 3 seconds.      Nails: There is no clubbing.     Neurological:      General: No focal deficit present.      Mental Status: He is alert.      GCS: GCS eye subscore is 4. GCS verbal subscore is 5. GCS motor subscore is 6.      Cranial Nerves: No cranial nerve deficit.      Motor: Weakness present.      Comments: AAO, follows all commands.    Psychiatric:         Behavior: Behavior is cooperative.         Vents:  Oxygen Concentration (%): 50 (08/21/20 0328)  Lines/Drains/Airways     Peripheral Intravenous Line                 Peripheral IV - Single Lumen 08/19/20 1335 20 G Right Antecubital 1 day         Peripheral IV - Single Lumen 08/19/20 1745 20 G Left Upper Arm 1 day              Significant Labs:    CBC/Anemia Profile:  Recent Labs   Lab 08/19/20  1336 08/19/20  1611 08/19/20  1944  08/20/20  0317   WBC 7.38  --  9.87 6.47   HGB 7.3*  --  9.0* 7.4*   HCT 22.6*  --  27.2* 22.8*     --  253 251   MCV 94  --  94 94   RDW 11.8  --  11.9 11.9   IRON  --  27*  --   --    FERRITIN 3,845*  --   --   --    RETIC  --  0.9  --   --         Chemistries:  Recent Labs   Lab 08/19/20  1336  08/20/20  0317 08/20/20  0833 08/20/20  1204 08/21/20  0522      < > 138  138 143 141 139   K 5.4*   < > 5.5*  5.5*  5.5* 5.0  5.0 4.8  4.8 5.7*      < > 107  107 111* 108 106   CO2 20*   < > 20*  20* 20* 23 22*   BUN 31*   < > 35*  35* 33* 34* 47*   CREATININE 2.5*   < > 2.6*  2.6* 2.3* 2.2* 2.3*   CALCIUM 9.3   < > 9.3  9.3 9.7 9.4 9.1   ALBUMIN 3.1*  --  2.9*  --   --  2.7*   PROT 7.4  --  7.7  --   --  7.9   BILITOT 0.9  --  0.5  --   --  0.4   ALKPHOS 235*  --  233*  --   --  199*   *  --  118*  --   --  97*   *  --  76*  --   --  70*   MG  --   --  2.3  --   --  2.3   PHOS  --   --  4.0  --   --  5.1*    < > = values in this interval not displayed.       All pertinent labs within the past 24 hours have been reviewed.    Significant Imaging:  I have reviewed all pertinent imaging results/findings within the past 24 hours.      AB  Recent Labs   Lab 08/19/20  1513   PH 7.404   PO2 55*   PCO2 27.4*   HCO3 17.2*   BE -7     Assessment/Plan:     Pulmonary  * Acute respiratory failure with hypoxia  COVID-19 Positive  65 y.o. male presented to [facility] ED with shortness of breath, saturating 61% on room air and was placed on non-rebreather with improvement in saturating to above 90%. COVID +. CXR revealed diffuse infiltrates bilaterally.    -- CAP coverage wth azithro and ceftriaxone  -- dexamethasone day 3/10  -- refused remdesivir  -- wean comfort flow, titrate for SpO2 88-92%  -- D-dimer elevated but downtrending; on heparin gtt  -- continue duo nebs BID and PRN  -- lasix for volume removal      Cardiac/Vascular  Hypertensive urgency  BP systolic 220 on presentation to [facility]. Reports compliance with home anti-hypertensives, BPs at home 130-160 SBP.     --initially on cardene gtt; weaned off 8/20  --started on carvedilol and amlopdipine  --PRN hydralazine   --echo to eval cardiac function    Oncology  Anemia  Hb 7 from unclear baseline - last lab 2017 Hb 12  No overt blood loss  Increased haptoglobin 546 & fibrinogen >800    Monitor with daily CBCs and transfuse for Hgb <7 or active bleed    Endocrine  Type 2 diabetes mellitus, uncontrolled  Holding home metformin. A1c 7.3 & BG >500 on admission    --d/c insulin infusion  --diabetic diet  --levemir, prandial, and SSI   --BG goal 140-180        Other  COVID-19  See respiratory failure      I spent >50 minutes reviewing patient records, examining, and counseling the patient with greater than 50% of the time spent with direct patient care and coordination.        Altaf Briggs NP  Critical Care Medicine  [facility] - [facility]

## 2020-08-21 NOTE — PROGRESS NOTES
[facility] - ICU 16   Critical Care Medicine  Progress Note    Patient Name: Filippo Martinez  MRN: [id number]  Admission Date: 8/19/2020  Hospital Length of Stay: 1 days  Code Status: Full Code  Attending Provider: Juan Antonio Hurtado MD  Primary Care Provider: No primary care provider on file.   Principal Problem: Acute respiratory failure with hypoxia    Subjective:     HPI:  Filippo Martinez is a 65 y.o. male with past medical history of HTN, DM2, CKD, who presented to [facility] ED with shortness of breath. Patient speaks Indonesian Creole and history obtained with the assistance of translation services. Patient states that he started feeling unwell on Friday with progressive shortness of breath, dry cough, and generalized malaise. The shortness of breath was severe today and so he presented to ED. He denies known sick contacts, fever, chest pain, leg swelling, headache. He denies bleeding. He denies blurry vision. Of note he recently got cataract surgery 10 days ago, but was reportedly covid negative at that time. On arrival in ED patient was saturating 61% on room air and was placed on non-rebreather with improvement in saturating to above 90%. He was tachycardic and tachypnic to 30s and , BP elevated 220/110. He was found to be COVID +. CXR was obtained which revealed diffuse infiltrates bilaterally. Labs significant for Hb 7 (most recently Hb 12 in 2017), Cr 2.5 (uncertain baseline, most recently 1.0 2017), BUN 31, Bicarb 20, K 5.4, Trop 0.03, D-dimer 3.48. ABG pH 7.4, pCO2 27, pO2 55 on 50% NRB. EKG with significant LVH. He was started on dexamethasone, Lovenox 40, and given rocephin and azithromycin. [place] [facility]     Hospital/ICU Course:  Transferred from [facility] for hypoxic respiratory failure and hypertensive urgency, COVID +. Started on Cardene infusion and HFNC. Started on oral antihypertensives and weaned off Cardene.     Interval History/Significant Events:  Weaned off cardene this morning. HFNC weaned to 20L 50%.     Review of Systems   Constitutional: Positive for fatigue. Negative for fever.   HENT: Negative for congestion.    Respiratory: Positive for cough and shortness of breath. Negative for chest tightness.    Cardiovascular: Negative for chest pain and leg swelling.   Gastrointestinal: Negative for abdominal pain, diarrhea, nausea and vomiting.   Genitourinary: Negative for difficulty urinating, dysuria and frequency.   Musculoskeletal: Negative for back pain and myalgias.   Skin: Negative for color change and pallor.   Neurological: Negative for dizziness, weakness, light-headedness and headaches.   Psychiatric/Behavioral: Negative for agitation and confusion.     Objective:     Vital Signs (Most Recent):  Temp: 98.2 °F (36.8 °C) (08/20/20 1945)  Pulse: 96 (08/20/20 2017)  Resp: (!) 24 (08/20/20 2017)  BP: (!) 191/142 (08/20/20 2000)  SpO2: 96 % (08/20/20 2017) Vital Signs (24h Range):  Temp:  [97.6 °F (36.4 °C)-98.4 °F (36.9 °C)] 98.2 °F (36.8 °C)  Pulse:  [] 96  Resp:  [6-45] 24  SpO2:  [92 %-100 %] 96 %  BP: (133-206)/() 191/142   Weight: 72.6 kg (160 lb)  Body mass index is 24.33 kg/m².      Intake/Output Summary (Last 24 hours) at 8/20/2020 2020  Last data filed at 8/20/2020 1900  Gross per 24 hour   Intake 1140.19 ml   Output 3730 ml   Net -2589.81 ml       Physical Exam  Constitutional:       Appearance: He is not toxic-appearing.   HENT:      Head: Normocephalic and atraumatic.   Neck:      Musculoskeletal: Normal range of motion and neck supple.   Cardiovascular:      Rate and Rhythm: Normal rate and regular rhythm.      Pulses: Normal pulses.      Heart sounds: Normal heart sounds. No murmur.   Pulmonary:      Effort: No tachypnea or accessory muscle usage.      Breath sounds: No decreased breath sounds, wheezing or rales.   Abdominal:      General: Abdomen is flat. There is no distension.      Palpations: Abdomen is soft.       Tenderness: There is no abdominal tenderness.   Musculoskeletal: Normal range of motion.         General: No swelling.   Skin:     General: Skin is warm and dry.   Neurological:      Mental Status: He is alert.      GCS: GCS eye subscore is 4. GCS verbal subscore is 5. GCS motor subscore is 6.      Comments: AAO, follows all commands.    Psychiatric:         Behavior: Behavior is cooperative.         Vents:  Oxygen Concentration (%): 50 (08/20/20 1907)  Lines/Drains/Airways     Peripheral Intravenous Line                 Peripheral IV - Single Lumen 08/19/20 1335 20 G Right Antecubital 1 day         Peripheral IV - Single Lumen 08/19/20 1745 20 G Left Upper Arm 1 day              Significant Labs:    CBC/Anemia Profile:  Recent Labs   Lab 08/19/20  1336 08/19/20  1611 08/19/20  1944 08/20/20  0317   WBC 7.38  --  9.87 6.47   HGB 7.3*  --  9.0* 7.4*   HCT 22.6*  --  27.2* 22.8*     --  253 251   MCV 94  --  94 94   RDW 11.8  --  11.9 11.9   IRON  --  27*  --   --    FERRITIN 3,845*  --   --   --    RETIC  --  0.9  --   --         Chemistries:  Recent Labs   Lab 08/19/20  1336  08/20/20  0317 08/20/20  0833 08/20/20  1204      < > 138  138 143 141   K 5.4*   < > 5.5*  5.5*  5.5* 5.0  5.0 4.8  4.8      < > 107  107 111* 108   CO2 20*   < > 20*  20* 20* 23   BUN 31*   < > 35*  35* 33* 34*   CREATININE 2.5*   < > 2.6*  2.6* 2.3* 2.2*   CALCIUM 9.3   < > 9.3  9.3 9.7 9.4   ALBUMIN 3.1*  --  2.9*  --   --    PROT 7.4  --  7.7  --   --    BILITOT 0.9  --  0.5  --   --    ALKPHOS 235*  --  233*  --   --    *  --  118*  --   --    *  --  76*  --   --    MG  --   --  2.3  --   --    PHOS  --   --  4.0  --   --     < > = values in this interval not displayed.       All pertinent labs within the past 24 hours have been reviewed.    Significant Imaging:  I have reviewed all pertinent imaging results/findings within the past 24 hours.      [facility]  Recent Labs   Lab 08/19/20  1983   PH 7.404    PO2 55*   PCO2 27.4*   HCO3 17.2*   BE -7     Assessment/Plan:     Pulmonary  * Acute respiratory failure with hypoxia  COVID-19 Positive  65 y.o. male [facility] ED with shortness of breath, saturating 61% on room air and was placed on non-rebreather with improvement in saturating to above 90%. COVID +. CXR revealed diffuse infiltrates bilaterally.  -- CAP coverage   -- dexamethasone X 10 days  -- refused remdesivir  -- on HFNC, titrate for SpO2 >88  -- D-dimer elevated; on heparin gtt      Cardiac/Vascular  Hypertensive urgency  BP systolic 220 on presentation to [facility]. Reports compliance with home anti-hypertensives, BPs at home 130-160 SBP.   --initially on cardene gtt; weaned off 8/20  --started on carvedilol and amlopdipine  --PRN hydralazine     Oncology  Anemia  Hb 7 from unclear baseline - last lab 2017 Hb 12  No overt blood loss  Hemolysis w/u     Endocrine  Type 2 diabetes mellitus, uncontrolled  On oral agents at home. BG >500   --insulin infusion  --diabetic diet      Other  COVID-19  See respiratory failure      Plan discussed with Dr. Hurtado.    Critical Care Time: 60 minutes  Critical care was time spent personally by me on the following activities: development of treatment plan with patient or surrogate and bedside caregivers, discussions with consultants, evaluation of patient's response to treatment, examination of patient, ordering and performing treatments and interventions, ordering and review of laboratory studies, ordering and review of radiographic studies, pulse oximetry, re-evaluation of patient's condition. This critical care time did not overlap with that of any other provider or involve time for any procedures.     Nelly Cox NP  Critical Care Medicine  [facility] - ICU [facility]

## 2020-08-21 NOTE — RESIDENT HANDOFF
ICU Transfer of Care Note  Critical Care Medicine    Admit Date: 8/19/2020  LOS: 2    CC: Acute respiratory failure with hypoxia    Code Status: Full Code     HPI and Hospital Course:     HPI:  Filippo Martinez is a 65 y.o. male with past medical history of HTN, DM2, CKD, who presented to [facility] ED with shortness of breath. Patient speaks Anguillan Creole and history obtained with the assistance of translation services. Patient states that he started feeling unwell on Friday with progressive shortness of breath, dry cough, and generalized malaise. The shortness of breath was severe today and so he presented to ED. He denies known sick contacts, fever, chest pain, leg swelling, headache. He denies bleeding. He denies blurry vision. Of note he recently got cataract surgery 10 days ago, but was reportedly covid negative at that time. On arrival in ED patient was saturating 61% on room air and was placed on non-rebreather with improvement in saturating to above 90%. He was tachycardic and tachypnic to 30s and , BP elevated 220/110. He was found to be COVID +. CXR was obtained which revealed diffuse infiltrates bilaterally. Labs significant for Hb 7 (most recently Hb 12 in 2017), Cr 2.5 (uncertain baseline, most recently 1.0 2017), BUN 31, Bicarb 20, K 5.4, Trop 0.03, D-dimer 3.48. ABG pH 7.4, pCO2 27, pO2 55 on 50% NRB. EKG with significant LVH. He was started on dexamethasone, Lovenox 40, and given rocephin and azithromycin. [place] [facility] [place]     Hospital/ICU Course:  Transferred from [facility] for hypoxic respiratory failure and hypertensive urgency, COVID +. Started on Cardene infusion and HFNC. Started on oral antihypertensives and weaned off Cardene. BP improved with PO medications and weaned down oxygen following lasix administration. Echo ordered to eval cardiac function.      To Follow Up:     Respiratory failure 2/2 COVID: on 2-3L NC, very responsive to diuresis with rapid  deescalation in oxygen requirements. Obtain echo to eval cardiac function as his BNP was slightly elevated as well.     DM: weaned off insulin gtt and on to levemir, prandial and SSI. Tolerating diabetic diet with good intake. May need insulin adjustments as he progresses.     HTN: off cardene, BP better controlled, can continue to add home medications as needed    Anemia: stable at Hgb around 7 without signs of active bleeding.    Hyperkalemia: given Lasix, EKG ordered and BMP to follow up on     Discharge Plan:     Home with family    Call with questions.

## 2020-08-21 NOTE — ASSESSMENT & PLAN NOTE
Holding home metformin. A1c 7.3 & BG >500 on admission    --d/c insulin infusion  --diabetic diet  --levemir, prandial, and SSI   --BG goal 140-180

## 2020-08-21 NOTE — CARE UPDATE
Updated wife, Gina, via telephone regarding patient's clinical status. All questions answered and concerns addressed.        Altaf Briggs NP  Pulmonary Critical Care Medicine  [facility]

## 2020-08-21 NOTE — SUBJECTIVE & OBJECTIVE
Interval History/Significant Events: Redose with lasix again today, oxygenation appears to continue to improve    Review of Systems   Constitutional: Positive for fatigue. Negative for fever.   HENT: Negative for congestion.    Respiratory: Positive for cough and shortness of breath. Negative for chest tightness.    Cardiovascular: Negative for chest pain and leg swelling.   Gastrointestinal: Negative for abdominal pain, diarrhea, nausea and vomiting.   Genitourinary: Negative for difficulty urinating, dysuria and frequency.   Musculoskeletal: Negative for back pain and myalgias.   Skin: Negative for color change and pallor.   Neurological: Negative for dizziness, weakness, light-headedness and headaches.   Psychiatric/Behavioral: Negative for agitation and confusion.     Objective:     Vital Signs (Most Recent):  Temp: 98.2 °F (36.8 °C) (08/20/20 1945)  Pulse: 81 (08/21/20 0820)  Resp: (!) 27 (08/21/20 0820)  BP: (!) 170/90 (08/21/20 0600)  SpO2: 100 % (08/21/20 0820) Vital Signs (24h Range):  Temp:  [97.6 °F (36.4 °C)-98.2 °F (36.8 °C)] 98.2 °F (36.8 °C)  Pulse:  [] 81  Resp:  [4-38] 27  SpO2:  [92 %-100 %] 100 %  BP: (132-206)/() 170/90   Weight: 72.6 kg (160 lb)  Body mass index is 24.33 kg/m².      Intake/Output Summary (Last 24 hours) at 8/21/2020 0828  Last data filed at 8/21/2020 0640  Gross per 24 hour   Intake 922.59 ml   Output 2860 ml   Net -1937.41 ml       Physical Exam  Vitals signs and nursing note reviewed.   Constitutional:       General: He is not in acute distress.     Appearance: Normal appearance. He is ill-appearing. He is not toxic-appearing or diaphoretic.   HENT:      Head: Normocephalic and atraumatic.   Eyes:      General: No scleral icterus.     Extraocular Movements:      Right eye: No nystagmus.      Left eye: No nystagmus.      Conjunctiva/sclera: Conjunctivae normal.      Right eye: No exudate or hemorrhage.     Left eye: No exudate or hemorrhage.     Pupils: Pupils are  equal, round, and reactive to light.   Neck:      Musculoskeletal: Normal range of motion and neck supple. No neck rigidity.      Trachea: Trachea normal. No tracheal deviation.   Cardiovascular:      Rate and Rhythm: Normal rate and regular rhythm.      Chest Wall: PMI is not displaced.      Pulses: Normal pulses.           Radial pulses are 2+ on the right side and 2+ on the left side.      Heart sounds: Normal heart sounds. No murmur. No friction rub. No gallop.       Comments: Warm extremities  Pulmonary:      Effort: Pulmonary effort is normal. No tachypnea, accessory muscle usage or respiratory distress.      Breath sounds: Normal breath sounds. No stridor. No decreased breath sounds, wheezing, rhonchi or rales.   Abdominal:      General: Abdomen is flat. Bowel sounds are normal. There is no distension.      Palpations: Abdomen is soft.      Tenderness: There is no abdominal tenderness.   Musculoskeletal: Normal range of motion.         General: No swelling.   Skin:     General: Skin is warm and dry.      Capillary Refill: Capillary refill takes 2 to 3 seconds.      Nails: There is no clubbing.     Neurological:      General: No focal deficit present.      Mental Status: He is alert.      GCS: GCS eye subscore is 4. GCS verbal subscore is 5. GCS motor subscore is 6.      Cranial Nerves: No cranial nerve deficit.      Motor: Weakness present.      Comments: AAO, follows all commands.    Psychiatric:         Behavior: Behavior is cooperative.         Vents:  Oxygen Concentration (%): 50 (08/21/20 0328)  Lines/Drains/Airways     Peripheral Intravenous Line                 Peripheral IV - Single Lumen 08/19/20 1335 20 G Right Antecubital 1 day         Peripheral IV - Single Lumen 08/19/20 1745 20 G Left Upper Arm 1 day              Significant Labs:    CBC/Anemia Profile:  Recent Labs   Lab 08/19/20  1336 08/19/20  1611 08/19/20  1944 08/20/20  0317   WBC 7.38  --  9.87 6.47   HGB 7.3*  --  9.0* 7.4*   HCT 22.6*   --  27.2* 22.8*     --  253 251   MCV 94  --  94 94   RDW 11.8  --  11.9 11.9   IRON  --  27*  --   --    FERRITIN 3,845*  --   --   --    RETIC  --  0.9  --   --         Chemistries:  Recent Labs   Lab 08/19/20  1336  08/20/20  0317 08/20/20  0833 08/20/20  1204 08/21/20  0522      < > 138  138 143 141 139   K 5.4*   < > 5.5*  5.5*  5.5* 5.0  5.0 4.8  4.8 5.7*      < > 107  107 111* 108 106   CO2 20*   < > 20*  20* 20* 23 22*   BUN 31*   < > 35*  35* 33* 34* 47*   CREATININE 2.5*   < > 2.6*  2.6* 2.3* 2.2* 2.3*   CALCIUM 9.3   < > 9.3  9.3 9.7 9.4 9.1   ALBUMIN 3.1*  --  2.9*  --   --  2.7*   PROT 7.4  --  7.7  --   --  7.9   BILITOT 0.9  --  0.5  --   --  0.4   ALKPHOS 235*  --  233*  --   --  199*   *  --  118*  --   --  97*   *  --  76*  --   --  70*   MG  --   --  2.3  --   --  2.3   PHOS  --   --  4.0  --   --  5.1*    < > = values in this interval not displayed.       All pertinent labs within the past 24 hours have been reviewed.    Significant Imaging:  I have reviewed all pertinent imaging results/findings within the past 24 hours.

## 2020-08-21 NOTE — ASSESSMENT & PLAN NOTE
BP systolic 220 on presentation to [facility]. Reports compliance with home anti-hypertensives, BPs at home 130-160 SBP.     --initially on cardene gtt; weaned off 8/20  --started on carvedilol and amlopdipine  --PRN hydralazine   --echo to eval cardiac function

## 2020-08-21 NOTE — EICU
Notified of patient being off insulin drip    Tolerating PO intake  On dexamethasone  Last accucheck 201    Will start Levemir 8 units tonight and moderate dose SSI with meals

## 2020-08-21 NOTE — PT/OT/SLP EVAL
Occupational Therapy   Evaluation and Discharge Note    Name: Filippo Martinez  MRN: [id number]  Admitting Diagnosis:  Acute respiratory failure with hypoxia  ; COVID    Recommendations:     Discharge Recommendations: home  Discharge Equipment Recommendations:  none  Barriers to discharge:  None    Assessment:     Filippo Martinez is a 65 y.o. male with a medical diagnosis of Acute respiratory failure with hypoxia. At this time, patient is functioning at their prior level of function and does not require further acute OT services.     Plan:     During this hospitalization, patient does not require further acute OT services.  Please re-consult if situation changes.    · Plan of Care Reviewed with: patient    Subjective     Chief Complaint: denies  Patient/Family Comments/goals: to go home    Occupational Profile:  Living Environment: lives with family in [facility] with no HAYLEE  Previous level of function: (I) with ADL and ambulation  Roles and Routines:   Equipment Used at home:  none  Assistance upon Discharge: spouse    Pain/Comfort:  · Pain Rating 1: 0/10  · Pain Rating Post-Intervention 1: 0/10    Patients cultural, spiritual, Amish conflicts given the current situation: no    Objective:     Communicated with: RN prior to session.  Patient found supine with blood pressure cuff, telemetry, pulse ox (continuous), oxygen(Comfort Flow) upon OT entry to room.    General Precautions: Standard, fall, airborne, contact, droplet   Orthopedic Precautions:    Braces:       Occupational Performance:    Bed Mobility:    · Modified Independent    Functional Mobility/Transfers:  · Patient completed Sit <> Stand Transfer with stand by assistance  with  no assistive device   · Functional Mobility: SBA x 4 steps to chair; limited by Comfort Flow    Activities of Daily Living:  · Feeding:  independence    · Grooming: independence    · Upper Body Dressing: modified independence    · Lower Body Dressing: modified independence       Cognitive/Visual Perceptual:  Alert, oriented and following commands    Physical Exam:  Postural examination/scapula alignment:    -       No postural abnormalities identified  Sensation:    -       Intact  Upper Extremity Range of Motion:     -       Right Upper Extremity: WFL  -       Left Upper Extremity: WFL  Upper Extremity Strength:    -       Right Upper Extremity: WFL  -       Left Upper Extremity: WFL   Strength:    -       Right Upper Extremity: WFL  -       Left Upper Extremity: WFL  Fine Motor Coordination:    -       Intact  Gross motor coordination:   WFL    AMPAC 6 Click ADL:  AMPAC Total Score: 24    Treatment & Education:  Pt ed on OT POC  Pt ed on OT d/c  Pt ed on ROM ex's 3x daily for increased overall strength and endurance  Education:    Patient left up in chair with all lines intact, call button in reach and RN notified    GOALS:   Multidisciplinary Problems     Occupational Therapy Goals     Not on file          Multidisciplinary Problems (Resolved)        Problem: Occupational Therapy Goal    Goal Priority Disciplines Outcome Interventions   Occupational Therapy Goal   (Resolved)     OT, PT/OT Met                    History:     Past Medical History:   Diagnosis Date    Diabetes mellitus, type 2     Hyperlipidemia     Hypertension 2004       No past surgical history on file.    Time Tracking:     OT Date of Treatment: 08/21/20  OT Start Time: 0840  OT Stop Time: 0850  OT Total Time (min): 10 min    Billable Minutes:Evaluation 10    MIRANDA Mercedes  8/21/2020

## 2020-08-21 NOTE — PT/OT/SLP EVAL
Physical Therapy Evaluation    Patient Name:  Filippo Martinez   MRN:  [id number]  Admit Date: 8/19/2020  Admitting Diagnosis:  Acute respiratory failure with hypoxia  Length of Stay: 2 days  Recent Surgery: * No surgery found *      Recommendations:     Discharge Recommendations:  home   Discharge Equipment Recommendations: none   Barriers to discharge: None    Assessment:     Filippo Martinez is a 65 y.o. male admitted with a medical diagnosis of Acute respiratory failure with hypoxia.      Problem List: impaired endurance, impaired cardiopulmonary response to activity, gait instability  Rehab Prognosis: Good; patient would benefit from acute skilled PT services to address these deficits and reach maximum level of function.      Plan:     During this hospitalization, patient to be seen 3 x/week to address the identified rehab impairments via gait training, therapeutic activities, therapeutic exercises, neuromuscular re-education and progress towards the established goals.    · Plan of Care Expires:  09/20/20    Subjective   Communicated with RN prior to session.  Patient found HOB elevated upon PT entry to room, agreeable to evaluation. Filippo Martinez's alone during session.    Chief Complaint: Shortness of Breath (pt called EMS for elevated BP, headache, and cough.  upon arival found with chest discomfort with sob x 2 days.   denies n/v/d or fever.   denies HA upon arrival to ed.)    Patient/Family Comments/goals: to get better and return home   Pain/Comfort:  · Pain Rating 1: 0/10  · Pain Rating Post-Intervention 1: 0/10    Living Environment:  Patient lives with wife in a [facility] with 0 HAYLEE.   Prior Level of Function:   Patient reports being indep with mobility & with ADLs.  Patient uses DME as follows: none. DME owned (not currently used): none.    Patient reports they will have assistance from wife upon discharge.    Objective:   Patient found with: blood pressure cuff, telemetry, pulse ox (continuous), oxygen,  "peripheral IV     General Precautions: Standard, Cardiac airborne, contact, droplet, fall(COVID (+))   Orthopedic Precautions:N/A   Braces: N/A   Oxygen Device: Comfort Flow  Vitals: BP (!) 166/89   Pulse 80   Temp 98.2 °F (36.8 °C) (Oral)   Resp (!) 35   Ht 5' 8" (1.727 m)   Wt 72.6 kg (160 lb)   SpO2 (!) 89%   BMI 24.33 kg/m²     Exams:  · Cognition:   · Alert and Cooperative  · Ox4  · Command following: Follows multistep  commands  · Fluency: clear/fluent    · RLE ROM: WFL  · RLE Strength: WFL  · LLE ROM: WFL  · LLE Strength: WFL    Outcome Measures:  AM-PAC 6 CLICK MOBILITY  Turning over in bed (including adjusting bedclothes, sheets and blankets)?: 4  Sitting down on and standing up from a chair with arms (e.g., wheelchair, bedside commode, etc.): 3  Moving from lying on back to sitting on the side of the bed?: 4  Moving to and from a bed to a chair (including a wheelchair)?: 3  Need to walk in hospital room?: 3  Climbing 3-5 steps with a railing?: 3  Basic Mobility Total Score: 20     Functional Mobility:  Additional staff present: OT  Bed Mobility:  · Supine to Sit: modified independence; HOB elevated  · Scooting anteriorly to EOB to have both feet planted on floor: modified independence    Sitting Balance at Edge of Bed:   Assistance Level Required: [place]   Time: 5 minutes    Transfers:   · Sit <> Stand Transfer: stand by assistance with no assistive device from EOB      Gait:   · Patient ambulated: 4ft   · Patient required: standy by assistance  · Patient used: no assistive device  · Gait Pattern observed: reciprocal gait  · Gait Deviation(s): decreased maikel  · Impairments due to: decreased endurance      Therapeutic Activities, Exercises, & Education:   Educated pt on PT role/POC  Educated pt on importance of OOB activity  Pt verbalized understanding    T/f to chair to increase tolerance to OOB activity and to create optimal positioning for lung expansion     Patient left HOB elevated " with all lines intact, call button in reach and RN notified.    GOALS:   Multidisciplinary Problems     Physical Therapy Goals        Problem: Physical Therapy Goal    Goal Priority Disciplines Outcome Goal Variances Interventions   Physical Therapy Goal     PT, PT/OT Ongoing, Progressing     Description: Goals to be met by: 2020    Patient will increase functional independence with mobility by performin. Gait  x 150 feet with Supervision - not met                     History:     Past Medical History:   Diagnosis Date    Diabetes mellitus, type 2     Hyperlipidemia     Hypertension 2004    Prostate cancer        Past Surgical History:   Procedure Laterality Date    CATARACT EXTRACTION W/  INTRAOCULAR LENS IMPLANT Right 2020    CATARACT EXTRACTION W/  INTRAOCULAR LENS IMPLANT Left 2020    PROSTATECTOMY  2017       Time Tracking:     PT Received On: 20  PT Start Time: 0840     PT Stop Time: 0850  PT Total Time (min): 10 min     Billable Minutes: Evaluation 10    Sunshine Salazar PT, DPT  2020  [phone]

## 2020-08-21 NOTE — ASSESSMENT & PLAN NOTE
COVID-19 Positive  65 y.o. male presented to [facility] with shortness of breath, saturating 61% on room air and was placed on non-rebreather with improvement in saturating to above 90%. COVID +. CXR revealed diffuse infiltrates bilaterally.    -- CAP coverage wth azithro and ceftriaxone  -- dexamethasone day 3/10  -- refused remdesivir  -- wean comfort flow, titrate for SpO2 88-92%  -- D-dimer elevated but downtrending; on heparin gtt  -- continue duo nebs BID and PRN  -- lasix daily for volume removal

## 2020-08-21 NOTE — SUBJECTIVE & OBJECTIVE
Interval History/Significant Events: Weaned off cardene this morning. HFNC weaned to 20L 50%.     Review of Systems   Constitutional: Positive for fatigue. Negative for fever.   HENT: Negative for congestion.    Respiratory: Positive for cough and shortness of breath. Negative for chest tightness.    Cardiovascular: Negative for chest pain and leg swelling.   Gastrointestinal: Negative for abdominal pain, diarrhea, nausea and vomiting.   Genitourinary: Negative for difficulty urinating, dysuria and frequency.   Musculoskeletal: Negative for back pain and myalgias.   Skin: Negative for color change and pallor.   Neurological: Negative for dizziness, weakness, light-headedness and headaches.   Psychiatric/Behavioral: Negative for agitation and confusion.     Objective:     Vital Signs (Most Recent):  Temp: 98.2 °F (36.8 °C) (08/20/20 1945)  Pulse: 96 (08/20/20 2017)  Resp: (!) 24 (08/20/20 2017)  BP: (!) 191/142 (08/20/20 2000)  SpO2: 96 % (08/20/20 2017) Vital Signs (24h Range):  Temp:  [97.6 °F (36.4 °C)-98.4 °F (36.9 °C)] 98.2 °F (36.8 °C)  Pulse:  [] 96  Resp:  [6-45] 24  SpO2:  [92 %-100 %] 96 %  BP: (133-206)/() 191/142   Weight: 72.6 kg (160 lb)  Body mass index is 24.33 kg/m².      Intake/Output Summary (Last 24 hours) at 8/20/2020 2020  Last data filed at 8/20/2020 1900  Gross per 24 hour   Intake 1140.19 ml   Output 3730 ml   Net -2589.81 ml       Physical Exam  Constitutional:       Appearance: He is not toxic-appearing.   HENT:      Head: Normocephalic and atraumatic.   Neck:      Musculoskeletal: Normal range of motion and neck supple.   Cardiovascular:      Rate and Rhythm: Normal rate and regular rhythm.      Pulses: Normal pulses.      Heart sounds: Normal heart sounds. No murmur.   Pulmonary:      Effort: No tachypnea or accessory muscle usage.      Breath sounds: No decreased breath sounds, wheezing or rales.   Abdominal:      General: Abdomen is flat. There is no distension.       Palpations: Abdomen is soft.      Tenderness: There is no abdominal tenderness.   Musculoskeletal: Normal range of motion.         General: No swelling.   Skin:     General: Skin is warm and dry.   Neurological:      Mental Status: He is alert.      GCS: GCS eye subscore is 4. GCS verbal subscore is 5. GCS motor subscore is 6.      Comments: AAO, follows all commands.    Psychiatric:         Behavior: Behavior is cooperative.         Vents:  Oxygen Concentration (%): 50 (08/20/20 1907)  Lines/Drains/Airways     Peripheral Intravenous Line                 Peripheral IV - Single Lumen 08/19/20 1335 20 G Right Antecubital 1 day         Peripheral IV - Single Lumen 08/19/20 1745 20 G Left Upper Arm 1 day              Significant Labs:    CBC/Anemia Profile:  Recent Labs   Lab 08/19/20  1336 08/19/20  1611 08/19/20  1944 08/20/20  0317   WBC 7.38  --  9.87 6.47   HGB 7.3*  --  9.0* 7.4*   HCT 22.6*  --  27.2* 22.8*     --  253 251   MCV 94  --  94 94   RDW 11.8  --  11.9 11.9   IRON  --  27*  --   --    FERRITIN 3,845*  --   --   --    RETIC  --  0.9  --   --         Chemistries:  Recent Labs   Lab 08/19/20  1336  08/20/20  0317 08/20/20  0833 08/20/20  1204      < > 138  138 143 141   K 5.4*   < > 5.5*  5.5*  5.5* 5.0  5.0 4.8  4.8      < > 107  107 111* 108   CO2 20*   < > 20*  20* 20* 23   BUN 31*   < > 35*  35* 33* 34*   CREATININE 2.5*   < > 2.6*  2.6* 2.3* 2.2*   CALCIUM 9.3   < > 9.3  9.3 9.7 9.4   ALBUMIN 3.1*  --  2.9*  --   --    PROT 7.4  --  7.7  --   --    BILITOT 0.9  --  0.5  --   --    ALKPHOS 235*  --  233*  --   --    *  --  118*  --   --    *  --  76*  --   --    MG  --   --  2.3  --   --    PHOS  --   --  4.0  --   --     < > = values in this interval not displayed.       All pertinent labs within the past 24 hours have been reviewed.    Significant Imaging:  I have reviewed all pertinent imaging results/findings within the past 24 hours.

## 2020-08-21 NOTE — ASSESSMENT & PLAN NOTE
COVID-19 Positive  65 y.o. male presented to [facility] ED with shortness of breath, saturating 61% on room air and was placed on non-rebreather with improvement in saturating to above 90%. COVID +. CXR revealed diffuse infiltrates bilaterally.    -- CAP coverage wth azithro and ceftriaxone  -- dexamethasone day 3/10  -- refused remdesivir  -- wean comfort flow, titrate for SpO2 88-92%  -- D-dimer elevated but downtrending; on heparin gtt  -- continue duo nebs BID and PRN  -- lasix for volume removal

## 2020-08-21 NOTE — HOSPITAL COURSE
Transferred from [facility] for hypoxic respiratory failure and hypertensive urgency, COVID +. Started on Cardene infusion and HFNC. Started on oral antihypertensives and weaned off Cardene. BP improved with PO medications and weaned down oxygen following lasix administration. Echo ordered to eval cardiac function.

## 2020-08-21 NOTE — ASSESSMENT & PLAN NOTE
BP systolic 220 on presentation to [facility]. Reports compliance with home anti-hypertensives, BPs at home 130-160 SBP.   --initially on cardene gtt; weaned off 8/20  --started on carvedilol and amlopdipine  --PRN hydralazine

## 2020-08-21 NOTE — PLAN OF CARE
Problem: Physical Therapy Goal  Goal: Physical Therapy Goal  Description: Goals to be met by: 2020    Patient will increase functional independence with mobility by performin. Gait  x 150 feet with Supervision - not met    Outcome: Ongoing, Progressing     Eval completed and goals appropriate

## 2020-08-21 NOTE — ASSESSMENT & PLAN NOTE
COVID-19 Positive  65 y.o. male presented to [facility] ED with shortness of breath, saturating 61% on room air and was placed on non-rebreather with improvement in saturating to above 90%. COVID +. CXR revealed diffuse infiltrates bilaterally.  -- CAP coverage   -- dexamethasone X 10 days  -- refused remdesivir  -- on HFNC, titrate for SpO2 >88  -- D-dimer elevated; on heparin gtt

## 2020-08-21 NOTE — PLAN OF CARE
Hospital Medicine Team R  ICU stepdown acceptance note    Filippo Martinez is a 65 year old [place]azi-American man with hypertension, hyperlipidemia, diabetes mellitus type 2, history of prostate cancer status post prostatectomy on 5/19/2017. He lives in [facility]. He is . His primary care physician is Dr. Rohan White. His urologist is Dr. Spenser Nunez.    He presented to [facility] Emergency Department on 8/19/2020 with shortness of breath, dry cough, and generalized malaise since 8/14/2020. Shortness of breath worsened. He was tested for COVID-19 prior to his cataract surgery on 8/4/2020 and was negative at the time. In the emergency department, his oxygen saturation was 61%. He was placed on non-rebreather with improvement. He had tachycardia with heart rate of 113, tachypnea with respiratory rate in the 30s, and uncontrolled hypertension with blood pressure of 220/110. He takes clonidine 0.1 mg twice daily, doxazosin 2 mg every evening, irbesartan 300 mg every evening, and metoprolol tartate 50 mg twice daily for hypertension. COVID-19 test was positive. Chest X-ray showed diffuse infiltrates. Labs showed anemia with hemoglobin 7.3 g/dL (from 12 on 6/21/2017), hyperkalemia (potassium 5.4 mmol/L), metabolic acidosis (bicarbonate 20 mmol/L), acute kidney injury (BUN 31 mg/dL, creatinine 2.5 mg/dL, from 12 and 1.09 on 6/21/2017), elevated AST (159 U/L) and ALT (147 U/L), elevated CRP (208.7 mg/L), elevated ferritin (3845 ng/mL), elevated D-dimer (3.48 mg/L). ABG showed pO2 55 on non-rebreather with 50% FiO2. ECG showed significant left ventricular hypertrophy. He was started on dexamethasone, prophylactic enoxaparin, ceftriaxone, and azithromycin. Due to [facility] not admitting COVID-19 patients, he was transferred to [facility] and admitted to Critical Care Medicine for hypertensive urgency.    He was put on  nicardipine infusion and weaned off after starting his home doxazosin and adding amlodipine and carvedilol. His hemoglobin A1c was only 7.3%, but on dexamethasone, his glucose was as high as >500 and he required insulin drip. Supplemental oxygen was changed to high flow nasal cannula. Enoxaparin was changed to therapeutic heparin infusion. He was given furosemide, and hypoxia subsequently improved significantly. He was weaned to low flow nasal cannula.     X-Ray Chest AP Portable 8/19/20:   FINDINGS: Moderate to severe opacification with ill-defined consolidation within the perihilar and lower lobes while nonspecific differential to include COVID-19 pneumoniae.  Clinical correlation and follow-up advised.  There is no large pleural effusion or pneumothorax.  Cardiomediastinal silhouette partially obscured by opacities.  Clinical correlation and follow-up advised   US Lower Extremity Veins Bilateral 8/20/20: FINDINGS: Right thigh veins: The common femoral, femoral, popliteal, and greater saphenous veins are patent and free of thrombus. The veins are normally compressible and have normal phasic flow and augmentation response.   Right calf veins: The visualized calf veins are patent.   Left thigh veins: The common femoral, femoral, popliteal, and greater saphenous veins are patent and free of thrombus. The veins are normally compressible and have normal phasic flow and augmentation response.   Left calf veins: The visualized calf veins are patent.   Impression:   No evidence of deep venous thrombosis in either lower extremity.     Plan:  Follow up echocardiogram.  Evaluate for PE with V/Q scan to determine need for continued therapeutic anticoagulation.  Wean supplemental oxygen.  Adjust hypertension medications.  Adjust insulin for steroid-induced hyperglycemia.

## 2020-08-21 NOTE — PLAN OF CARE
Problem: Occupational Therapy Goal  Goal: Occupational Therapy Goal  Outcome: Met   OT eval completed; no goals established as pt is performing ADL safely and without A. MIRANDA Mercedes  8/21/2020

## 2020-08-21 NOTE — PLAN OF CARE
Problem: Skin Injury Risk Increased  Goal: Skin Health and Integrity  Outcome: Ongoing, Progressing     Problem: Hypertension Comorbidity  Goal: Blood Pressure in Desired Range  Outcome: Ongoing, Progressing     Problem: Diabetes Comorbidity  Goal: Blood Glucose Level Within Desired Range  Outcome: Ongoing, Not Progressing   Attempted to transition to sq insulin, levamir 8 units given, insulin dtt off/on. Pt refused q1 BG from [id number] despite RN education on potential risks. Provider aware, opted to keep insulin dtt on. Hopefully will be weaned off in AM. Remained on comfort flow with few issues.

## 2020-08-21 NOTE — ASSESSMENT & PLAN NOTE
Hb 7 from unclear baseline - last lab 2017 Hb 12  No overt blood loss  Increased haptoglobin 546 & fibrinogen >800    Monitor with daily CBCs and transfuse for Hgb <7 or active bleed

## 2020-08-22 PROBLEM — R73.9 STEROID-INDUCED HYPERGLYCEMIA: Status: RESOLVED | Noted: 2020-08-21 | Resolved: 2020-08-22

## 2020-08-22 PROBLEM — T38.0X5A STEROID-INDUCED HYPERGLYCEMIA: Status: RESOLVED | Noted: 2020-08-21 | Resolved: 2020-08-22

## 2020-08-22 LAB
ALBUMIN SERPL BCP-MCNC: 3 G/DL (ref 3.5–5.2)
ALP SERPL-CCNC: 192 U/L (ref 55–135)
ALT SERPL W/O P-5'-P-CCNC: 96 U/L (ref 10–44)
ANION GAP SERPL CALC-SCNC: 14 MMOL/L (ref 8–16)
ANISOCYTOSIS BLD QL SMEAR: SLIGHT
AST SERPL-CCNC: 51 U/L (ref 10–40)
BASOPHILS # BLD AUTO: ABNORMAL K/UL (ref 0–0.2)
BASOPHILS NFR BLD: 0 % (ref 0–1.9)
BILIRUB SERPL-MCNC: 0.4 MG/DL (ref 0.1–1)
BUN SERPL-MCNC: 60 MG/DL (ref 8–23)
CALCIUM SERPL-MCNC: 9.3 MG/DL (ref 8.7–10.5)
CHLORIDE SERPL-SCNC: 105 MMOL/L (ref 95–110)
CO2 SERPL-SCNC: 20 MMOL/L (ref 23–29)
CREAT SERPL-MCNC: 2.3 MG/DL (ref 0.5–1.4)
D DIMER PPP IA.FEU-MCNC: 1.37 MG/L FEU
DIFFERENTIAL METHOD: ABNORMAL
EOSINOPHIL # BLD AUTO: ABNORMAL K/UL (ref 0–0.5)
EOSINOPHIL NFR BLD: 0 % (ref 0–8)
ERYTHROCYTE [DISTWIDTH] IN BLOOD BY AUTOMATED COUNT: 11.8 % (ref 11.5–14.5)
EST. GFR  (AFRICAN AMERICAN): 33.2 ML/MIN/1.73 M^2
EST. GFR  (NON AFRICAN AMERICAN): 28.7 ML/MIN/1.73 M^2
FACT X PPP CHRO-ACNC: 0.27 IU/ML (ref 0.3–0.7)
GLUCOSE SERPL-MCNC: 126 MG/DL (ref 70–110)
HCT VFR BLD AUTO: 23.7 % (ref 40–54)
HGB BLD-MCNC: 7.4 G/DL (ref 14–18)
HYPOCHROMIA BLD QL SMEAR: ABNORMAL
IMM GRANULOCYTES # BLD AUTO: ABNORMAL K/UL (ref 0–0.04)
IMM GRANULOCYTES NFR BLD AUTO: ABNORMAL % (ref 0–0.5)
LYMPHOCYTES # BLD AUTO: ABNORMAL K/UL (ref 1–4.8)
LYMPHOCYTES NFR BLD: 11 % (ref 18–48)
MAGNESIUM SERPL-MCNC: 2.5 MG/DL (ref 1.6–2.6)
MCH RBC QN AUTO: 29.5 PG (ref 27–31)
MCHC RBC AUTO-ENTMCNC: 31.2 G/DL (ref 32–36)
MCV RBC AUTO: 94 FL (ref 82–98)
METAMYELOCYTES NFR BLD MANUAL: 1 %
MONOCYTES # BLD AUTO: ABNORMAL K/UL (ref 0.3–1)
MONOCYTES NFR BLD: 5 % (ref 4–15)
MYELOCYTES NFR BLD MANUAL: 2 %
NEUTROPHILS NFR BLD: 80 % (ref 38–73)
NEUTS BAND NFR BLD MANUAL: 1 %
NRBC BLD-RTO: 0 /100 WBC
PHOSPHATE SERPL-MCNC: 5.5 MG/DL (ref 2.7–4.5)
PLATELET # BLD AUTO: 363 K/UL (ref 150–350)
PLATELET BLD QL SMEAR: ABNORMAL
PMV BLD AUTO: 12.8 FL (ref 9.2–12.9)
POCT GLUCOSE: 124 MG/DL (ref 70–110)
POCT GLUCOSE: 130 MG/DL (ref 70–110)
POCT GLUCOSE: 142 MG/DL (ref 70–110)
POCT GLUCOSE: 149 MG/DL (ref 70–110)
POCT GLUCOSE: 150 MG/DL (ref 70–110)
POCT GLUCOSE: 157 MG/DL (ref 70–110)
POCT GLUCOSE: 172 MG/DL (ref 70–110)
POCT GLUCOSE: 199 MG/DL (ref 70–110)
POCT GLUCOSE: 301 MG/DL (ref 70–110)
POCT GLUCOSE: 355 MG/DL (ref 70–110)
POCT GLUCOSE: 398 MG/DL (ref 70–110)
POLYCHROMASIA BLD QL SMEAR: ABNORMAL
POTASSIUM SERPL-SCNC: 4.4 MMOL/L (ref 3.5–5.1)
PROT SERPL-MCNC: 7.7 G/DL (ref 6–8.4)
RBC # BLD AUTO: 2.51 M/UL (ref 4.6–6.2)
SODIUM SERPL-SCNC: 139 MMOL/L (ref 136–145)
WBC # BLD AUTO: 13.01 K/UL (ref 3.9–12.7)

## 2020-08-22 PROCEDURE — 94664 DEMO&/EVAL PT USE INHALER: CPT

## 2020-08-22 PROCEDURE — 99232 PR SUBSEQUENT HOSPITAL CARE,LEVL II: ICD-10-PCS | Mod: ,,, | Performed by: HOSPITALIST

## 2020-08-22 PROCEDURE — 25000003 PHARM REV CODE 250: Performed by: NURSE PRACTITIONER

## 2020-08-22 PROCEDURE — 85027 COMPLETE CBC AUTOMATED: CPT

## 2020-08-22 PROCEDURE — 84100 ASSAY OF PHOSPHORUS: CPT

## 2020-08-22 PROCEDURE — 80053 COMPREHEN METABOLIC PANEL: CPT

## 2020-08-22 PROCEDURE — 63600175 PHARM REV CODE 636 W HCPCS: Performed by: HOSPITALIST

## 2020-08-22 PROCEDURE — 20000000 HC ICU ROOM

## 2020-08-22 PROCEDURE — 36415 COLL VENOUS BLD VENIPUNCTURE: CPT

## 2020-08-22 PROCEDURE — 63600175 PHARM REV CODE 636 W HCPCS: Performed by: NURSE PRACTITIONER

## 2020-08-22 PROCEDURE — 25000003 PHARM REV CODE 250: Performed by: STUDENT IN AN ORGANIZED HEALTH CARE EDUCATION/TRAINING PROGRAM

## 2020-08-22 PROCEDURE — 83735 ASSAY OF MAGNESIUM: CPT

## 2020-08-22 PROCEDURE — 94640 AIRWAY INHALATION TREATMENT: CPT

## 2020-08-22 PROCEDURE — 25000003 PHARM REV CODE 250: Performed by: INTERNAL MEDICINE

## 2020-08-22 PROCEDURE — 63600175 PHARM REV CODE 636 W HCPCS: Performed by: STUDENT IN AN ORGANIZED HEALTH CARE EDUCATION/TRAINING PROGRAM

## 2020-08-22 PROCEDURE — 85520 HEPARIN ASSAY: CPT

## 2020-08-22 PROCEDURE — 99232 SBSQ HOSP IP/OBS MODERATE 35: CPT | Mod: ,,, | Performed by: HOSPITALIST

## 2020-08-22 PROCEDURE — 99900035 HC TECH TIME PER 15 MIN (STAT)

## 2020-08-22 PROCEDURE — 27000221 HC OXYGEN, UP TO 24 HOURS

## 2020-08-22 PROCEDURE — 25000242 PHARM REV CODE 250 ALT 637 W/ HCPCS: Performed by: NURSE PRACTITIONER

## 2020-08-22 PROCEDURE — 85007 BL SMEAR W/DIFF WBC COUNT: CPT

## 2020-08-22 PROCEDURE — 94761 N-INVAS EAR/PLS OXIMETRY MLT: CPT

## 2020-08-22 PROCEDURE — 85379 FIBRIN DEGRADATION QUANT: CPT

## 2020-08-22 PROCEDURE — C9399 UNCLASSIFIED DRUGS OR BIOLOG: HCPCS | Performed by: STUDENT IN AN ORGANIZED HEALTH CARE EDUCATION/TRAINING PROGRAM

## 2020-08-22 RX ORDER — INSULIN ASPART 100 [IU]/ML
0-5 INJECTION, SOLUTION INTRAVENOUS; SUBCUTANEOUS
Status: DISCONTINUED | OUTPATIENT
Start: 2020-08-22 | End: 2020-08-24 | Stop reason: HOSPADM

## 2020-08-22 RX ORDER — ENOXAPARIN SODIUM 100 MG/ML
1 INJECTION SUBCUTANEOUS
Status: DISCONTINUED | OUTPATIENT
Start: 2020-08-22 | End: 2020-08-24 | Stop reason: HOSPADM

## 2020-08-22 RX ADMIN — AMLODIPINE BESYLATE 10 MG: 10 TABLET ORAL at 08:08

## 2020-08-22 RX ADMIN — CARVEDILOL 25 MG: 25 TABLET, FILM COATED ORAL at 09:08

## 2020-08-22 RX ADMIN — CEFTRIAXONE 2 G: 2 INJECTION, SOLUTION INTRAVENOUS at 08:08

## 2020-08-22 RX ADMIN — INSULIN DETEMIR 10 UNITS: 100 INJECTION, SOLUTION SUBCUTANEOUS at 09:08

## 2020-08-22 RX ADMIN — INSULIN ASPART 4 UNITS: 100 INJECTION, SOLUTION INTRAVENOUS; SUBCUTANEOUS at 11:08

## 2020-08-22 RX ADMIN — ENOXAPARIN SODIUM 70 MG: 80 INJECTION SUBCUTANEOUS at 09:08

## 2020-08-22 RX ADMIN — DOXAZOSIN 2 MG: 1 TABLET ORAL at 09:08

## 2020-08-22 RX ADMIN — FUROSEMIDE 60 MG: 10 INJECTION, SOLUTION INTRAMUSCULAR; INTRAVENOUS at 08:08

## 2020-08-22 RX ADMIN — INSULIN ASPART 3 UNITS: 100 INJECTION, SOLUTION INTRAVENOUS; SUBCUTANEOUS at 09:08

## 2020-08-22 RX ADMIN — IPRATROPIUM BROMIDE AND ALBUTEROL SULFATE 3 ML: .5; 2.5 SOLUTION RESPIRATORY (INHALATION) at 08:08

## 2020-08-22 RX ADMIN — INSULIN ASPART 5 UNITS: 100 INJECTION, SOLUTION INTRAVENOUS; SUBCUTANEOUS at 05:08

## 2020-08-22 RX ADMIN — CARVEDILOL 25 MG: 25 TABLET, FILM COATED ORAL at 08:08

## 2020-08-22 NOTE — ASSESSMENT & PLAN NOTE
He takes metformin and glimepiride. Insulin detemir, insulin aspart sliding scale while in the hospital. Monitor Accuchecks.

## 2020-08-22 NOTE — ASSESSMENT & PLAN NOTE
Stable since admission. Elevated ferritin in the setting of COVID-19 complicates diagnosis of etiology. Follow up outpatient.

## 2020-08-22 NOTE — ASSESSMENT & PLAN NOTE
Acute respiratory failure with hypoxia  Isolation precautions. Empiric antibiotics due to elevated procalcitonin. Evaluating for heart failure due to improvement of hypoxia with diuresis. Wean supplemental oxygen as tolerated.

## 2020-08-22 NOTE — HOSPITAL COURSE
He was put on nicardipine infusion and weaned off after starting his home doxazosin and adding amlodipine and carvedilol (to replace clonidine and metoprolol). Irbesartan was not restarted due to renal insufficiency. Antibiotics were given due to elevated procalcitonin. His hemoglobin A1c was only 7.3%, but on dexamethasone, his glucose was as high as >500 and he required insulin drip. Supplemental oxygen was changed to high flow nasal cannula. Enoxaparin was changed to therapeutic heparin infusion. He was given furosemide, and hypoxia subsequently improved significantly. He was weaned to low flow nasal cannula. Dexamethasone was stopped and hyperglycemia improved. He was transferred to Hospital Medicine Team R on 8/22/2020. Heparin infusion was changed to enoxaparin. D-dimer continued to decrease. Echocardiogram on 8/23/2020 showed left atrial enlargement, diastolic dysfunction, and left ventricular hypertrophy. Diuresis was continued. He completed 5 days of antibiotics. He was weaned off supplemental oxygen on 8/24/2020 with oxygen saturation of 91% on room air. Net fluid status was negative 4 liters. He was discharged home with medication adjustments as follows:    Clonidine was changed to amlodipine  Metoprolol was changed to carvedilol  Metformin dose was decreased from 1000 mg to 500 mg due to renal insufficiency  Furosemide 40 mg daily for another 5 days    Creatinine only improved to 2.2 mg/dL, which may suggest his chronic kidney disease advanced from stage 2 to stage 3 between labs in 2017 and recently, so he was advised to resume his irbesartan at discharge. Hemoglobin remained stable as well, and it is unknown when he developed anemia after his last labs in 2017.    His medical findings and recommendations were discussed with both his wife and his sister.

## 2020-08-22 NOTE — HPI
Filippo Martinez is a 65 year old [place]-American man with hypertension, hyperlipidemia, diabetes mellitus type 2, chronic kidney disease, history of prostate cancer status post prostatectomy on 5/19/2017. He lives in [facility]. He is . His sister is a family medicine physician. He is a [employer]. He is vegetarian. His primary care physician is Dr. Rohan White. His urologist is Dr. Spenser Nunez.               He presented to [facility] Emergency Department on 8/19/2020 with shortness of breath, dry cough, and generalized malaise since 8/14/2020. Shortness of breath worsened. He was tested for COVID-19 prior to his cataract surgery on 8/4/2020 and was negative at the time. In the emergency department, his oxygen saturation was 61%. He was placed on non-rebreather with improvement. He had tachycardia with heart rate of 113, tachypnea with respiratory rate in the 30s, and uncontrolled hypertension with blood pressure of 220/110. He takes clonidine 0.1 mg twice daily, doxazosin 2 mg every evening, irbesartan 300 mg every evening, and metoprolol tartate 50 mg twice daily for hypertension. COVID-19 test was positive. Chest X-ray showed diffuse infiltrates. Labs showed anemia with hemoglobin 7.3 g/dL (from 12 on 6/21/2017), hyperkalemia (potassium 5.4 mmol/L), metabolic acidosis (bicarbonate 20 mmol/L), acute kidney injury (BUN 31 mg/dL, creatinine 2.5 mg/dL, from 12 and 1.09 on 6/21/2017), elevated AST (159 U/L) and ALT (147 U/L), elevated CRP (208.7 mg/L), elevated ferritin (3845 ng/mL), elevated D-dimer (3.48 mg/L). ABG showed pO2 55 on non-rebreather with 50% FiO2. ECG showed significant left ventricular hypertrophy. He was started on dexamethasone, prophylactic enoxaparin, ceftriaxone, and azithromycin. Due to [facility] not admitting COVID-19 patients, he was transferred to [facility] and admitted to  Critical Care Medicine for hypertensive urgency.

## 2020-08-22 NOTE — PROGRESS NOTES
[facility] Medicine  Progress Note    Patient Name: Filippo Martinez  MRN: [id number]  Patient Class: IP- Inpatient   Admission Date: 8/19/2020  Length of Stay: 3 days  Attending Physician: Buster Webb MD  Primary Care Provider: Rohan White MD        Subjective:     Principal Problem:Acute respiratory failure with hypoxia        HPI:  Filippo Martinez is a 65 year old [place]-American man with hypertension, hyperlipidemia, diabetes mellitus type 2, history of prostate cancer status post prostatectomy on 5/19/2017. He lives in [facility]. He is . His primary care physician is Dr. Rohan White. His urologist is Dr. Spenser Nunez.               He presented to [facility] Emergency Department on 8/19/2020 with shortness of breath, dry cough, and generalized malaise since 8/14/2020. Shortness of breath worsened. He was tested for COVID-19 prior to his cataract surgery on 8/4/2020 and was negative at the time. In the emergency department, his oxygen saturation was 61%. He was placed on non-rebreather with improvement. He had tachycardia with heart rate of 113, tachypnea with respiratory rate in the 30s, and uncontrolled hypertension with blood pressure of 220/110. He takes clonidine 0.1 mg twice daily, doxazosin 2 mg every evening, irbesartan 300 mg every evening, and metoprolol tartate 50 mg twice daily for hypertension. COVID-19 test was positive. Chest X-ray showed diffuse infiltrates. Labs showed anemia with hemoglobin 7.3 g/dL (from 12 on 6/21/2017), hyperkalemia (potassium 5.4 mmol/L), metabolic acidosis (bicarbonate 20 mmol/L), acute kidney injury (BUN 31 mg/dL, creatinine 2.5 mg/dL, from 12 and 1.09 on 6/21/2017), elevated AST (159 U/L) and ALT (147 U/L), elevated CRP (208.7 mg/L), elevated ferritin (3845 ng/mL), elevated D-dimer (3.48 mg/L). ABG showed pO2 55 on non-rebreather with 50% FiO2. ECG showed significant  left ventricular hypertrophy. He was started on dexamethasone, prophylactic enoxaparin, ceftriaxone, and azithromycin. Due to [facility] not admitting COVID-19 patients, he was transferred to [facility] and admitted to Critical Care Medicine for hypertensive urgency.    Overview/Hospital Course:  He was put on nicardipine infusion and weaned off after starting his home doxazosin and adding amlodipine and carvedilol. Antibiotics were given due to elevated procalcitonin. His hemoglobin A1c was only 7.3%, but on dexamethasone, his glucose was as high as >500 and he required insulin drip. Supplemental oxygen was changed to high flow nasal cannula. Enoxaparin was changed to therapeutic heparin infusion. He was given furosemide, and hypoxia subsequently improved significantly. He was weaned to low flow nasal cannula. Dexamethasone was stopped and hyperglycemia improved. He was transferred to Hospital Medicine Team R on 8/22/2020.     Interval History: Does not offer any complaints.    Review of Systems   Constitutional: Negative for chills and fever.   Gastrointestinal: Negative for nausea and vomiting.     Objective:     Vital Signs (Most Recent):  Temp: 97.7 °F (36.5 °C) (08/22/20 0800)  Pulse: 81 (08/22/20 0845)  Resp: 20 (08/22/20 0845)  BP: (!) 159/82 (08/22/20 0830)  SpO2: (!) 93 % (08/22/20 1103) Vital Signs (24h Range):  Temp:  [97.7 °F (36.5 °C)-98.5 °F (36.9 °C)] 97.7 °F (36.5 °C)  Pulse:  [] 81  Resp:  [19-37] 20  SpO2:  [90 %-100 %] 93 %  BP: (122-177)/(70-97) 159/82     Weight: 72.6 kg (160 lb)  Body mass index is 24.33 kg/m².    Intake/Output Summary (Last 24 hours) at 8/22/2020 1358  Last data filed at 8/22/2020 0847  Gross per 24 hour   Intake 1143.49 ml   Output 1125 ml   Net 18.49 ml      Physical Exam  Vitals signs and nursing note reviewed.   Constitutional:       Appearance: He is not ill-appearing.      Comments: Nasal cannula on his face but  prongs not in his nose   Pulmonary:      Effort: Pulmonary effort is normal. No respiratory distress.   Neurological:      Mental Status: He is alert.   Psychiatric:         Attention and Perception: Attention normal.         Mood and Affect: Mood and affect normal.         Significant Labs: All pertinent labs within the past 24 hours have been reviewed.    Significant Imaging: I have reviewed all pertinent imaging results/findings within the past 24 hours.      Assessment/Plan:      Essential hypertension  He takes clonidine 0.1 mg twice daily, doxazosin 2 mg every evening, irbesartan 300 mg every evening, metoprolol tartrate 50 mg twice daily. Giving doxazosin 2 mg every evening, amlodipine 10 mg daily, carvedilol 25 mg twice daily. Blood pressures still high, but will see if he has heart failure before adjusting further.    Anemia  Stable since admission. Elevated ferritin in the setting of COVID-19 complicates diagnosis of etiology. Follow up outpatient.    COVID-19  Acute respiratory failure with hypoxia  Isolation precautions. Empiric antibiotics due to elevated procalcitonin. Evaluating for heart failure due to improvement of hypoxia with diuresis. Wean supplemental oxygen as tolerated.    Type 2 diabetes mellitus with hyperglycemia, without long-term current use of insulin  He takes metformin and glimepiride. Insulin detemir, insulin aspart sliding scale while in the hospital. Monitor Accuchecks.    Hyperlipidemia  He takes pravastatin.      VTE Risk Mitigation (From admission, onward)         Ordered     enoxaparin injection 70 mg  Every 24 hours (non-standard times)      08/22/20 0825     IP VTE HIGH RISK PATIENT  Once      08/19/20 2003     Place sequential compression device  Until discontinued      08/19/20 2003                Discharge Planning   ISAÍAS: 8/25/2020     Code Status: Full Code   Is the patient medically ready for discharge?:     Reason for patient still in hospital (select all that apply):  Patient trending condition  Discharge Plan A: Home                  Buster Webb MD  Department of Hospital Medicine   [facility]

## 2020-08-22 NOTE — SUBJECTIVE & OBJECTIVE
Interval History: Does not offer any complaints.    Review of Systems   Constitutional: Negative for chills and fever.   Gastrointestinal: Negative for nausea and vomiting.     Objective:     Vital Signs (Most Recent):  Temp: 97.7 °F (36.5 °C) (08/22/20 0800)  Pulse: 81 (08/22/20 0845)  Resp: 20 (08/22/20 0845)  BP: (!) 159/82 (08/22/20 0830)  SpO2: (!) 93 % (08/22/20 1103) Vital Signs (24h Range):  Temp:  [97.7 °F (36.5 °C)-98.5 °F (36.9 °C)] 97.7 °F (36.5 °C)  Pulse:  [] 81  Resp:  [19-37] 20  SpO2:  [90 %-100 %] 93 %  BP: (122-177)/(70-97) 159/82     Weight: 72.6 kg (160 lb)  Body mass index is 24.33 kg/m².    Intake/Output Summary (Last 24 hours) at 8/22/2020 1358  Last data filed at 8/22/2020 0847  Gross per 24 hour   Intake 1143.49 ml   Output 1125 ml   Net 18.49 ml      Physical Exam  Vitals signs and nursing note reviewed.   Constitutional:       Appearance: He is not ill-appearing.      Comments: Nasal cannula on his face but prongs not in his nose   Pulmonary:      Effort: Pulmonary effort is normal. No respiratory distress.   Neurological:      Mental Status: He is alert.   Psychiatric:         Attention and Perception: Attention normal.         Mood and Affect: Mood and affect normal.         Significant Labs: All pertinent labs within the past 24 hours have been reviewed.    Significant Imaging: I have reviewed all pertinent imaging results/findings within the past 24 hours.

## 2020-08-22 NOTE — ASSESSMENT & PLAN NOTE
He takes clonidine 0.1 mg twice daily, doxazosin 2 mg every evening, irbesartan 300 mg every evening, metoprolol tartrate 50 mg twice daily. Giving doxazosin 2 mg every evening, amlodipine 10 mg daily, carvedilol 25 mg twice daily. Blood pressures still high, but will see if he has heart failure before adjusting further.

## 2020-08-22 NOTE — PLAN OF CARE
POC updated and reviewed with pt. Pt verbalizes understanding.    Neuro remains intact.     Pt on 2L NC sats 97% and above.     Pt voids independently, urinal at bedside.     Currently on Insulin @ 2.8 units/hr and Heparin @ 15 units/kg/hr. Glucose less than 200 since 0100.     No acute events noted throughout the night. All questions and concerns addressed. See flowsheets for vital signs and shift assessments. Will continue to monitor.

## 2020-08-23 PROBLEM — I50.31 ACUTE DIASTOLIC CONGESTIVE HEART FAILURE: Status: ACTIVE | Noted: 2020-08-23

## 2020-08-23 LAB
ALBUMIN SERPL BCP-MCNC: 2.8 G/DL (ref 3.5–5.2)
ALP SERPL-CCNC: 178 U/L (ref 55–135)
ALT SERPL W/O P-5'-P-CCNC: 93 U/L (ref 10–44)
ANION GAP SERPL CALC-SCNC: 13 MMOL/L (ref 8–16)
ANISOCYTOSIS BLD QL SMEAR: SLIGHT
ASCENDING AORTA: 3.1 CM
AST SERPL-CCNC: 37 U/L (ref 10–40)
AV INDEX (PROSTH): 0.49
AV MEAN GRADIENT: 8 MMHG
AV PEAK GRADIENT: 14 MMHG
AV VALVE AREA: 1.87 CM2
AV VELOCITY RATIO: 0.44
BASOPHILS # BLD AUTO: ABNORMAL K/UL (ref 0–0.2)
BASOPHILS NFR BLD: 0 % (ref 0–1.9)
BILIRUB SERPL-MCNC: 0.3 MG/DL (ref 0.1–1)
BSA FOR ECHO PROCEDURE: 1.87 M2
BUN SERPL-MCNC: 66 MG/DL (ref 8–23)
CALCIUM SERPL-MCNC: 9 MG/DL (ref 8.7–10.5)
CHLORIDE SERPL-SCNC: 105 MMOL/L (ref 95–110)
CO2 SERPL-SCNC: 20 MMOL/L (ref 23–29)
CREAT SERPL-MCNC: 2.5 MG/DL (ref 0.5–1.4)
CV ECHO LV RWT: 0.43 CM
DACRYOCYTES BLD QL SMEAR: ABNORMAL
DIFFERENTIAL METHOD: ABNORMAL
DOP CALC AO PEAK VEL: 1.85 M/S
DOP CALC AO VTI: 28.5 CM
DOP CALC LVOT AREA: 3.8 CM2
DOP CALC LVOT DIAMETER: 2.2 CM
DOP CALC LVOT PEAK VEL: 0.81 M/S
DOP CALC LVOT STROKE VOLUME: 53.15 CM3
DOP CALCLVOT PEAK VEL VTI: 13.99 CM
E WAVE DECELERATION TIME: 190.79 MSEC
E/A RATIO: 0.64
E/E' RATIO: 20 M/S
ECHO LV POSTERIOR WALL: 1.1 CM (ref 0.6–1.1)
EOSINOPHIL # BLD AUTO: ABNORMAL K/UL (ref 0–0.5)
EOSINOPHIL NFR BLD: 1 % (ref 0–8)
ERYTHROCYTE [DISTWIDTH] IN BLOOD BY AUTOMATED COUNT: 11.9 % (ref 11.5–14.5)
EST. GFR  (AFRICAN AMERICAN): 30 ML/MIN/1.73 M^2
EST. GFR  (NON AFRICAN AMERICAN): 26 ML/MIN/1.73 M^2
FRACTIONAL SHORTENING: 26 % (ref 28–44)
GLUCOSE SERPL-MCNC: 280 MG/DL (ref 70–110)
HCT VFR BLD AUTO: 24 % (ref 40–54)
HGB BLD-MCNC: 7.6 G/DL (ref 14–18)
HYPOCHROMIA BLD QL SMEAR: ABNORMAL
IMM GRANULOCYTES # BLD AUTO: ABNORMAL K/UL (ref 0–0.04)
IMM GRANULOCYTES NFR BLD AUTO: ABNORMAL % (ref 0–0.5)
INTERVENTRICULAR SEPTUM: 1.1 CM (ref 0.6–1.1)
IVRT: 105.61 MSEC
LA MAJOR: 5.75 CM
LA MINOR: 5.66 CM
LA WIDTH: 4.2 CM
LEFT ATRIUM SIZE: 4.09 CM
LEFT ATRIUM VOLUME INDEX: 44.8 ML/M2
LEFT ATRIUM VOLUME: 83.3 CM3
LEFT INTERNAL DIMENSION IN SYSTOLE: 3.8 CM (ref 2.1–4)
LEFT VENTRICLE DIASTOLIC VOLUME INDEX: 68.55 ML/M2
LEFT VENTRICLE DIASTOLIC VOLUME: 127.43 ML
LEFT VENTRICLE MASS INDEX: 117 G/M2
LEFT VENTRICLE SYSTOLIC VOLUME INDEX: 32.5 ML/M2
LEFT VENTRICLE SYSTOLIC VOLUME: 60.44 ML
LEFT VENTRICULAR INTERNAL DIMENSION IN DIASTOLE: 5.16 CM (ref 3.5–6)
LEFT VENTRICULAR MASS: 218 G
LV LATERAL E/E' RATIO: 17.5 M/S
LV SEPTAL E/E' RATIO: 23.33 M/S
LYMPHOCYTES # BLD AUTO: ABNORMAL K/UL (ref 1–4.8)
LYMPHOCYTES NFR BLD: 12 % (ref 18–48)
MAGNESIUM SERPL-MCNC: 2.3 MG/DL (ref 1.6–2.6)
MCH RBC QN AUTO: 30.2 PG (ref 27–31)
MCHC RBC AUTO-ENTMCNC: 31.7 G/DL (ref 32–36)
MCV RBC AUTO: 95 FL (ref 82–98)
MONOCYTES # BLD AUTO: ABNORMAL K/UL (ref 0.3–1)
MONOCYTES NFR BLD: 5 % (ref 4–15)
MV PEAK A VEL: 1.1 M/S
MV PEAK E VEL: 0.7 M/S
MV STENOSIS PRESSURE HALF TIME: 55.33 MS
MV VALVE AREA P 1/2 METHOD: 3.98 CM2
NEUTROPHILS NFR BLD: 80 % (ref 38–73)
NEUTS BAND NFR BLD MANUAL: 2 %
NRBC BLD-RTO: 0 /100 WBC
PHOSPHATE SERPL-MCNC: 7.4 MG/DL (ref 2.7–4.5)
PLATELET # BLD AUTO: 346 K/UL (ref 150–350)
PLATELET BLD QL SMEAR: ABNORMAL
PMV BLD AUTO: 11.8 FL (ref 9.2–12.9)
POCT GLUCOSE: 216 MG/DL (ref 70–110)
POCT GLUCOSE: 348 MG/DL (ref 70–110)
POCT GLUCOSE: 350 MG/DL (ref 70–110)
POCT GLUCOSE: 434 MG/DL (ref 70–110)
POIKILOCYTOSIS BLD QL SMEAR: SLIGHT
POLYCHROMASIA BLD QL SMEAR: ABNORMAL
POTASSIUM SERPL-SCNC: 4.5 MMOL/L (ref 3.5–5.1)
PROT SERPL-MCNC: 7.3 G/DL (ref 6–8.4)
PULM VEIN S/D RATIO: 1.76
PV PEAK D VEL: 0.25 M/S
PV PEAK S VEL: 0.44 M/S
RA MAJOR: 4.34 CM
RA PRESSURE: 3 MMHG
RA WIDTH: 3.7 CM
RBC # BLD AUTO: 2.52 M/UL (ref 4.6–6.2)
RIGHT VENTRICULAR END-DIASTOLIC DIMENSION: 2.98 CM
RV TISSUE DOPPLER FREE WALL SYSTOLIC VELOCITY 1 (APICAL 4 CHAMBER VIEW): 16.05 CM/S
SINUS: 2.81 CM
SMUDGE CELLS BLD QL SMEAR: PRESENT
SODIUM SERPL-SCNC: 138 MMOL/L (ref 136–145)
STJ: 2.83 CM
TDI LATERAL: 0.04 M/S
TDI SEPTAL: 0.03 M/S
TDI: 0.04 M/S
TRICUSPID ANNULAR PLANE SYSTOLIC EXCURSION: 1.81 CM
WBC # BLD AUTO: 12.76 K/UL (ref 3.9–12.7)

## 2020-08-23 PROCEDURE — 94761 N-INVAS EAR/PLS OXIMETRY MLT: CPT

## 2020-08-23 PROCEDURE — 63600175 PHARM REV CODE 636 W HCPCS: Performed by: HOSPITALIST

## 2020-08-23 PROCEDURE — 99232 SBSQ HOSP IP/OBS MODERATE 35: CPT | Mod: ,,, | Performed by: HOSPITALIST

## 2020-08-23 PROCEDURE — 85007 BL SMEAR W/DIFF WBC COUNT: CPT

## 2020-08-23 PROCEDURE — 25000003 PHARM REV CODE 250: Performed by: INTERNAL MEDICINE

## 2020-08-23 PROCEDURE — 84100 ASSAY OF PHOSPHORUS: CPT

## 2020-08-23 PROCEDURE — 63600175 PHARM REV CODE 636 W HCPCS: Performed by: NURSE PRACTITIONER

## 2020-08-23 PROCEDURE — 85027 COMPLETE CBC AUTOMATED: CPT

## 2020-08-23 PROCEDURE — 94640 AIRWAY INHALATION TREATMENT: CPT

## 2020-08-23 PROCEDURE — 25000003 PHARM REV CODE 250: Performed by: HOSPITALIST

## 2020-08-23 PROCEDURE — 25000003 PHARM REV CODE 250: Performed by: STUDENT IN AN ORGANIZED HEALTH CARE EDUCATION/TRAINING PROGRAM

## 2020-08-23 PROCEDURE — 25000242 PHARM REV CODE 250 ALT 637 W/ HCPCS: Performed by: NURSE PRACTITIONER

## 2020-08-23 PROCEDURE — 99900035 HC TECH TIME PER 15 MIN (STAT)

## 2020-08-23 PROCEDURE — 80053 COMPREHEN METABOLIC PANEL: CPT

## 2020-08-23 PROCEDURE — 25000003 PHARM REV CODE 250: Performed by: NURSE PRACTITIONER

## 2020-08-23 PROCEDURE — C9399 UNCLASSIFIED DRUGS OR BIOLOG: HCPCS | Performed by: HOSPITALIST

## 2020-08-23 PROCEDURE — 99232 PR SUBSEQUENT HOSPITAL CARE,LEVL II: ICD-10-PCS | Mod: ,,, | Performed by: HOSPITALIST

## 2020-08-23 PROCEDURE — 94664 DEMO&/EVAL PT USE INHALER: CPT

## 2020-08-23 PROCEDURE — 27000221 HC OXYGEN, UP TO 24 HOURS

## 2020-08-23 PROCEDURE — 20600001 HC STEP DOWN PRIVATE ROOM

## 2020-08-23 PROCEDURE — 83735 ASSAY OF MAGNESIUM: CPT

## 2020-08-23 RX ORDER — BISACODYL 5 MG
5 TABLET, DELAYED RELEASE (ENTERIC COATED) ORAL DAILY PRN
Status: DISCONTINUED | OUTPATIENT
Start: 2020-08-23 | End: 2020-08-24 | Stop reason: HOSPADM

## 2020-08-23 RX ORDER — CALCIUM CARBONATE 200(500)MG
500 TABLET,CHEWABLE ORAL 3 TIMES DAILY PRN
Status: DISCONTINUED | OUTPATIENT
Start: 2020-08-23 | End: 2020-08-24 | Stop reason: HOSPADM

## 2020-08-23 RX ORDER — INSULIN ASPART 100 [IU]/ML
15 INJECTION, SOLUTION INTRAVENOUS; SUBCUTANEOUS
Status: DISCONTINUED | OUTPATIENT
Start: 2020-08-23 | End: 2020-08-24 | Stop reason: HOSPADM

## 2020-08-23 RX ORDER — FUROSEMIDE 40 MG/1
40 TABLET ORAL DAILY
Status: DISCONTINUED | OUTPATIENT
Start: 2020-08-24 | End: 2020-08-24 | Stop reason: HOSPADM

## 2020-08-23 RX ADMIN — MELATONIN TAB 3 MG 6 MG: 3 TAB at 08:08

## 2020-08-23 RX ADMIN — FUROSEMIDE 60 MG: 10 INJECTION, SOLUTION INTRAMUSCULAR; INTRAVENOUS at 08:08

## 2020-08-23 RX ADMIN — CEFTRIAXONE 2 G: 2 INJECTION, SOLUTION INTRAVENOUS at 08:08

## 2020-08-23 RX ADMIN — INSULIN ASPART 15 UNITS: 100 INJECTION, SOLUTION INTRAVENOUS; SUBCUTANEOUS at 11:08

## 2020-08-23 RX ADMIN — DOXAZOSIN 2 MG: 1 TABLET ORAL at 08:08

## 2020-08-23 RX ADMIN — AMLODIPINE BESYLATE 10 MG: 10 TABLET ORAL at 08:08

## 2020-08-23 RX ADMIN — INSULIN ASPART 15 UNITS: 100 INJECTION, SOLUTION INTRAVENOUS; SUBCUTANEOUS at 03:08

## 2020-08-23 RX ADMIN — IPRATROPIUM BROMIDE AND ALBUTEROL SULFATE 3 ML: .5; 2.5 SOLUTION RESPIRATORY (INHALATION) at 09:08

## 2020-08-23 RX ADMIN — INSULIN ASPART 2 UNITS: 100 INJECTION, SOLUTION INTRAVENOUS; SUBCUTANEOUS at 08:08

## 2020-08-23 RX ADMIN — ENOXAPARIN SODIUM 70 MG: 80 INJECTION SUBCUTANEOUS at 08:08

## 2020-08-23 RX ADMIN — INSULIN DETEMIR 20 UNITS: 100 INJECTION, SOLUTION SUBCUTANEOUS at 08:08

## 2020-08-23 RX ADMIN — INSULIN ASPART 5 UNITS: 100 INJECTION, SOLUTION INTRAVENOUS; SUBCUTANEOUS at 11:08

## 2020-08-23 RX ADMIN — CARVEDILOL 25 MG: 25 TABLET, FILM COATED ORAL at 08:08

## 2020-08-23 RX ADMIN — INSULIN ASPART 4 UNITS: 100 INJECTION, SOLUTION INTRAVENOUS; SUBCUTANEOUS at 03:08

## 2020-08-23 NOTE — PLAN OF CARE
POC updated and reviewed with pt. Pt verbalizes understanding.    Pt remained on 3L NC thoughout the night. Pt does desat down to 85% quickly without oxygen.    NS rhythm. HR 70s-80s.    Pt voids per urinal without any difficulty. 1 moderate BM noted during this shift.    Bedtime glucose was 398. Pt covered with long acting and sliding scale insulin.     No acute events noted. See flowsheets for full assessments and vital signs. All questions and concerns addressed. Continue to monitor.

## 2020-08-23 NOTE — ASSESSMENT & PLAN NOTE
Acute diastolic congestive heart failure  He takes clonidine 0.1 mg twice daily, doxazosin 2 mg every evening, irbesartan 300 mg every evening, metoprolol tartrate 50 mg twice daily. Giving doxazosin 2 mg every evening, amlodipine 10 mg daily, carvedilol 25 mg twice daily. Blood pressures better controlled, were likely the etiology of heart failure. Change IV furosemide to PO.

## 2020-08-23 NOTE — PROGRESS NOTES
[facility] Medicine  Progress Note    Patient Name: Filippo Martinez  MRN: [id number]  Patient Class: IP- Inpatient   Admission Date: 8/19/2020  Length of Stay: 4 days  Attending Physician: Buster Webb MD  Primary Care Provider: Rohan White MD        Subjective:     Principal Problem:Acute respiratory failure with hypoxia        HPI:  Filippo Martinez is a 65 year old [place]-American man with hypertension, hyperlipidemia, diabetes mellitus type 2, history of prostate cancer status post prostatectomy on 5/19/2017. He lives in [facility]. He is . His primary care physician is Dr. Rohan White. His urologist is Dr. Spenser Nunez.               He presented to [facility] Department on 8/19/2020 with shortness of breath, dry cough, and generalized malaise since 8/14/2020. Shortness of breath worsened. He was tested for COVID-19 prior to his cataract surgery on 8/4/2020 and was negative at the time. In the emergency department, his oxygen saturation was 61%. He was placed on non-rebreather with improvement. He had tachycardia with heart rate of 113, tachypnea with respiratory rate in the 30s, and uncontrolled hypertension with blood pressure of 220/110. He takes clonidine 0.1 mg twice daily, doxazosin 2 mg every evening, irbesartan 300 mg every evening, and metoprolol tartate 50 mg twice daily for hypertension. COVID-19 test was positive. Chest X-ray showed diffuse infiltrates. Labs showed anemia with hemoglobin 7.3 g/dL (from 12 on 6/21/2017), hyperkalemia (potassium 5.4 mmol/L), metabolic acidosis (bicarbonate 20 mmol/L), acute kidney injury (BUN 31 mg/dL, creatinine 2.5 mg/dL, from 12 and 1.09 on 6/21/2017), elevated AST (159 U/L) and ALT (147 U/L), elevated CRP (208.7 mg/L), elevated ferritin (3845 ng/mL), elevated D-dimer (3.48 mg/L). ABG showed pO2 55 on non-rebreather with 50% FiO2. ECG showed significant  left ventricular hypertrophy. He was started on dexamethasone, prophylactic enoxaparin, ceftriaxone, and azithromycin. Due to [facility] not admitting COVID-19 patients, he was transferred to [facility] and admitted to Critical Care Medicine for hypertensive urgency.    Overview/Hospital Course:  He was put on nicardipine infusion and weaned off after starting his home doxazosin and adding amlodipine and carvedilol. Antibiotics were given due to elevated procalcitonin. His hemoglobin A1c was only 7.3%, but on dexamethasone, his glucose was as high as >500 and he required insulin drip. Supplemental oxygen was changed to high flow nasal cannula. Enoxaparin was changed to therapeutic heparin infusion. He was given furosemide, and hypoxia subsequently improved significantly. He was weaned to low flow nasal cannula. Dexamethasone was stopped and hyperglycemia improved. He was transferred to Hospital Medicine Team R on 8/22/2020. Echocardiogram on 8/23/2020 showed left atrial enlargement, diastolic dysfunction, and left ventricular hypertrophy.     Interval History: No complaints. No cough.    Review of Systems   Constitutional: Negative for chills and fever.   Respiratory: Negative for cough.    Gastrointestinal: Negative for nausea and vomiting.     Objective:     Vital Signs (Most Recent):  Temp: 97.9 °F (36.6 °C) (08/23/20 1100)  Pulse: 84 (08/23/20 1100)  Resp: (!) 29 (08/23/20 1100)  BP: 132/73 (08/23/20 1100)  SpO2: 99 % (08/23/20 1100) Vital Signs (24h Range):  Temp:  [97.6 °F (36.4 °C)-98.3 °F (36.8 °C)] 97.9 °F (36.6 °C)  Pulse:  [75-93] 84  Resp:  [20-42] 29  SpO2:  [85 %-100 %] 99 %  BP: (105-156)/(59-88) 132/73     Weight: 72.6 kg (160 lb)  Body mass index is 24.33 kg/m².    Intake/Output Summary (Last 24 hours) at 8/23/2020 1408  Last data filed at 8/23/2020 1000  Gross per 24 hour   Intake 410 ml   Output 1325 ml   Net -915 ml      Physical Exam  Vitals signs  and nursing note reviewed.   Constitutional:       Appearance: He is not ill-appearing.      Interventions: Nasal cannula in place.   Pulmonary:      Effort: Pulmonary effort is normal. No respiratory distress.   Neurological:      Mental Status: He is alert.   Psychiatric:         Attention and Perception: Attention normal.         Mood and Affect: Mood and affect normal.         Significant Labs: All pertinent labs within the past 24 hours have been reviewed.    Significant Imaging: I have reviewed all pertinent imaging results/findings within the past 24 hours.      Assessment/Plan:      Essential hypertension  Acute diastolic congestive heart failure  He takes clonidine 0.1 mg twice daily, doxazosin 2 mg every evening, irbesartan 300 mg every evening, metoprolol tartrate 50 mg twice daily. Giving doxazosin 2 mg every evening, amlodipine 10 mg daily, carvedilol 25 mg twice daily. Blood pressures better controlled, were likely the etiology of heart failure. Change IV furosemide to PO.    Anemia  Stable since admission. Elevated ferritin in the setting of COVID-19 complicates diagnosis of etiology. Follow up outpatient.    COVID-19  Acute respiratory failure with hypoxia  Isolation precautions. Empiric antibiotics due to elevated procalcitonin. Wean supplemental oxygen as tolerated.    Type 2 diabetes mellitus with hyperglycemia, without long-term current use of insulin  He takes metformin and glimepiride. Insulin detemir, insulin aspart sliding scale while in the hospital. Monitor Accuchecks.    Hyperlipidemia  He takes pravastatin.      VTE Risk Mitigation (From admission, onward)         Ordered     enoxaparin injection 70 mg  Every 24 hours (non-standard times)      08/22/20 0825     IP VTE HIGH RISK PATIENT  Once      08/19/20 2003     Place sequential compression device  Until discontinued      08/19/20 2003                Discharge Planning   ISAÍAS: 8/25/2020     Code Status: Full Code   Is the patient  medically ready for discharge?:     Reason for patient still in hospital (select all that apply): Other (specify) Still on supplemental oxygen.  Discharge Plan A: Home                  Buster Webb MD  Department of Hospital Medicine   [facility]

## 2020-08-23 NOTE — SUBJECTIVE & OBJECTIVE
Interval History: No complaints. No cough.    Review of Systems   Constitutional: Negative for chills and fever.   Respiratory: Negative for cough.    Gastrointestinal: Negative for nausea and vomiting.     Objective:     Vital Signs (Most Recent):  Temp: 97.9 °F (36.6 °C) (08/23/20 1100)  Pulse: 84 (08/23/20 1100)  Resp: (!) 29 (08/23/20 1100)  BP: 132/73 (08/23/20 1100)  SpO2: 99 % (08/23/20 1100) Vital Signs (24h Range):  Temp:  [97.6 °F (36.4 °C)-98.3 °F (36.8 °C)] 97.9 °F (36.6 °C)  Pulse:  [75-93] 84  Resp:  [20-42] 29  SpO2:  [85 %-100 %] 99 %  BP: (105-156)/(59-88) 132/73     Weight: 72.6 kg (160 lb)  Body mass index is 24.33 kg/m².    Intake/Output Summary (Last 24 hours) at 8/23/2020 1408  Last data filed at 8/23/2020 1000  Gross per 24 hour   Intake 410 ml   Output 1325 ml   Net -915 ml      Physical Exam  Vitals signs and nursing note reviewed.   Constitutional:       Appearance: He is not ill-appearing.      Interventions: Nasal cannula in place.   Pulmonary:      Effort: Pulmonary effort is normal. No respiratory distress.   Neurological:      Mental Status: He is alert.   Psychiatric:         Attention and Perception: Attention normal.         Mood and Affect: Mood and affect normal.         Significant Labs: All pertinent labs within the past 24 hours have been reviewed.    Significant Imaging: I have reviewed all pertinent imaging results/findings within the past 24 hours.

## 2020-08-23 NOTE — NURSING TRANSFER
Nursing Transfer Note      8/23/2020     Transfer To: [id number]    Transfer via wheelchair    Transfer with  to O2    Transported by RN    Medicines sent: Insulin aspart& Insulin detemir     Chart send with patient: Yes    Notified: spouse    Upon arrival to floor: cardiac monitor applied, patient oriented to room, call bell in reach and bed in lowest position

## 2020-08-23 NOTE — ASSESSMENT & PLAN NOTE
Acute respiratory failure with hypoxia  Isolation precautions. Empiric antibiotics due to elevated procalcitonin. Wean supplemental oxygen as tolerated.

## 2020-08-24 ENCOUNTER — NURSE TRIAGE (OUTPATIENT)
Dept: ADMINISTRATIVE | Facility: CLINIC | Age: 65
End: 2020-08-24

## 2020-08-24 ENCOUNTER — TELEPHONE (OUTPATIENT)
Dept: ADMINISTRATIVE | Facility: CLINIC | Age: 65
End: 2020-08-24

## 2020-08-24 ENCOUNTER — HOSPITAL ENCOUNTER (EMERGENCY)
Facility: HOSPITAL | Age: 65
Discharge: LEFT BEFORE DISCHARGE | End: 2020-08-25
Attending: EMERGENCY MEDICINE
Payer: MEDICARE

## 2020-08-24 VITALS
HEIGHT: 68 IN | OXYGEN SATURATION: 94 % | WEIGHT: 160 LBS | TEMPERATURE: 98 F | RESPIRATION RATE: 23 BRPM | SYSTOLIC BLOOD PRESSURE: 145 MMHG | BODY MASS INDEX: 24.25 KG/M2 | HEART RATE: 95 BPM | DIASTOLIC BLOOD PRESSURE: 85 MMHG

## 2020-08-24 DIAGNOSIS — N17.9 ACUTE RENAL FAILURE SUPERIMPOSED ON CHRONIC KIDNEY DISEASE, UNSPECIFIED CKD STAGE, UNSPECIFIED ACUTE RENAL FAILURE TYPE: ICD-10-CM

## 2020-08-24 DIAGNOSIS — D64.9 ANEMIA, UNSPECIFIED TYPE: ICD-10-CM

## 2020-08-24 DIAGNOSIS — N18.9 ACUTE RENAL FAILURE SUPERIMPOSED ON CHRONIC KIDNEY DISEASE, UNSPECIFIED CKD STAGE, UNSPECIFIED ACUTE RENAL FAILURE TYPE: ICD-10-CM

## 2020-08-24 DIAGNOSIS — R09.02 HYPOXIA: ICD-10-CM

## 2020-08-24 DIAGNOSIS — R06.82 TACHYPNEA: ICD-10-CM

## 2020-08-24 DIAGNOSIS — R73.9 HYPERGLYCEMIA: ICD-10-CM

## 2020-08-24 DIAGNOSIS — R50.9 FEVER, UNSPECIFIED FEVER CAUSE: ICD-10-CM

## 2020-08-24 DIAGNOSIS — U07.1 COVID-19 VIRUS INFECTION: Primary | ICD-10-CM

## 2020-08-24 PROBLEM — J96.01 ACUTE RESPIRATORY FAILURE WITH HYPOXIA: Status: RESOLVED | Noted: 2020-08-19 | Resolved: 2020-08-24

## 2020-08-24 LAB
ALBUMIN SERPL BCP-MCNC: 2.7 G/DL (ref 3.5–5.2)
ALBUMIN SERPL BCP-MCNC: 2.9 G/DL (ref 3.5–5.2)
ALLENS TEST: ABNORMAL
ALP SERPL-CCNC: 153 U/L (ref 55–135)
ALP SERPL-CCNC: 171 U/L (ref 55–135)
ALT SERPL W/O P-5'-P-CCNC: 65 U/L (ref 10–44)
ALT SERPL W/O P-5'-P-CCNC: 66 U/L (ref 10–44)
ANION GAP SERPL CALC-SCNC: 12 MMOL/L (ref 8–16)
ANION GAP SERPL CALC-SCNC: 13 MMOL/L (ref 8–16)
ANISOCYTOSIS BLD QL SMEAR: SLIGHT
AST SERPL-CCNC: 21 U/L (ref 10–40)
AST SERPL-CCNC: 28 U/L (ref 10–40)
BACTERIA #/AREA URNS HPF: ABNORMAL /HPF
BACTERIA BLD CULT: NORMAL
BACTERIA BLD CULT: NORMAL
BASOPHILS # BLD AUTO: ABNORMAL K/UL (ref 0–0.2)
BASOPHILS # BLD AUTO: ABNORMAL K/UL (ref 0–0.2)
BASOPHILS NFR BLD: 0 % (ref 0–1.9)
BASOPHILS NFR BLD: 0 % (ref 0–1.9)
BILIRUB SERPL-MCNC: 0.3 MG/DL (ref 0.1–1)
BILIRUB SERPL-MCNC: 0.3 MG/DL (ref 0.1–1)
BILIRUB UR QL STRIP: NEGATIVE
BNP SERPL-MCNC: 19 PG/ML (ref 0–99)
BUN SERPL-MCNC: 55 MG/DL (ref 8–23)
BUN SERPL-MCNC: 56 MG/DL (ref 8–23)
CALCIUM SERPL-MCNC: 8.7 MG/DL (ref 8.7–10.5)
CALCIUM SERPL-MCNC: 8.9 MG/DL (ref 8.7–10.5)
CHLORIDE SERPL-SCNC: 103 MMOL/L (ref 95–110)
CHLORIDE SERPL-SCNC: 105 MMOL/L (ref 95–110)
CK SERPL-CCNC: 26 U/L (ref 20–200)
CLARITY UR: CLEAR
CO2 SERPL-SCNC: 20 MMOL/L (ref 23–29)
CO2 SERPL-SCNC: 21 MMOL/L (ref 23–29)
COLOR UR: YELLOW
CREAT SERPL-MCNC: 2.2 MG/DL (ref 0.5–1.4)
CREAT SERPL-MCNC: 2.9 MG/DL (ref 0.5–1.4)
CRP SERPL-MCNC: 33.8 MG/L (ref 0–8.2)
D DIMER PPP IA.FEU-MCNC: 1.02 MG/L FEU
DELSYS: ABNORMAL
DIFFERENTIAL METHOD: ABNORMAL
DIFFERENTIAL METHOD: ABNORMAL
EOSINOPHIL # BLD AUTO: ABNORMAL K/UL (ref 0–0.5)
EOSINOPHIL # BLD AUTO: ABNORMAL K/UL (ref 0–0.5)
EOSINOPHIL NFR BLD: 0 % (ref 0–8)
EOSINOPHIL NFR BLD: 1 % (ref 0–8)
ERYTHROCYTE [DISTWIDTH] IN BLOOD BY AUTOMATED COUNT: 11.9 % (ref 11.5–14.5)
ERYTHROCYTE [DISTWIDTH] IN BLOOD BY AUTOMATED COUNT: 11.9 % (ref 11.5–14.5)
EST. GFR  (AFRICAN AMERICAN): 25 ML/MIN/1.73 M^2
EST. GFR  (AFRICAN AMERICAN): 35 ML/MIN/1.73 M^2
EST. GFR  (NON AFRICAN AMERICAN): 22 ML/MIN/1.73 M^2
EST. GFR  (NON AFRICAN AMERICAN): 30.3 ML/MIN/1.73 M^2
FERRITIN SERPL-MCNC: 944 NG/ML (ref 20–300)
GLUCOSE SERPL-MCNC: 184 MG/DL (ref 70–110)
GLUCOSE SERPL-MCNC: 483 MG/DL (ref 70–110)
GLUCOSE UR QL STRIP: ABNORMAL
GRAN CASTS #/AREA URNS LPF: 2 /LPF
HCO3 UR-SCNC: 19.5 MMOL/L (ref 24–28)
HCT VFR BLD AUTO: 22.2 % (ref 40–54)
HCT VFR BLD AUTO: 22.6 % (ref 40–54)
HGB BLD-MCNC: 7.2 G/DL (ref 14–18)
HGB BLD-MCNC: 7.2 G/DL (ref 14–18)
HGB UR QL STRIP: NEGATIVE
HYALINE CASTS #/AREA URNS LPF: 1 /LPF
HYPOCHROMIA BLD QL SMEAR: ABNORMAL
IMM GRANULOCYTES # BLD AUTO: ABNORMAL K/UL (ref 0–0.04)
IMM GRANULOCYTES # BLD AUTO: ABNORMAL K/UL (ref 0–0.04)
IMM GRANULOCYTES NFR BLD AUTO: ABNORMAL % (ref 0–0.5)
IMM GRANULOCYTES NFR BLD AUTO: ABNORMAL % (ref 0–0.5)
KETONES UR QL STRIP: NEGATIVE
LACTATE SERPL-SCNC: 1.7 MMOL/L (ref 0.5–2.2)
LDH SERPL L TO P-CCNC: 419 U/L (ref 110–260)
LEUKOCYTE ESTERASE UR QL STRIP: NEGATIVE
LYMPHOCYTES # BLD AUTO: ABNORMAL K/UL (ref 1–4.8)
LYMPHOCYTES # BLD AUTO: ABNORMAL K/UL (ref 1–4.8)
LYMPHOCYTES NFR BLD: 13 % (ref 18–48)
LYMPHOCYTES NFR BLD: 15 % (ref 18–48)
MAGNESIUM SERPL-MCNC: 2.2 MG/DL (ref 1.6–2.6)
MCH RBC QN AUTO: 29.8 PG (ref 27–31)
MCH RBC QN AUTO: 30.3 PG (ref 27–31)
MCHC RBC AUTO-ENTMCNC: 31.9 G/DL (ref 32–36)
MCHC RBC AUTO-ENTMCNC: 32.4 G/DL (ref 32–36)
MCV RBC AUTO: 92 FL (ref 82–98)
MCV RBC AUTO: 95 FL (ref 82–98)
METAMYELOCYTES NFR BLD MANUAL: 1 %
MICROSCOPIC COMMENT: ABNORMAL
MONOCYTES # BLD AUTO: ABNORMAL K/UL (ref 0.3–1)
MONOCYTES # BLD AUTO: ABNORMAL K/UL (ref 0.3–1)
MONOCYTES NFR BLD: 6 % (ref 4–15)
MONOCYTES NFR BLD: 7 % (ref 4–15)
MYELOCYTES NFR BLD MANUAL: 1 %
NEUTROPHILS # BLD AUTO: ABNORMAL K/UL (ref 1.8–7.7)
NEUTROPHILS NFR BLD: 76 % (ref 38–73)
NEUTROPHILS NFR BLD: 78 % (ref 38–73)
NEUTS BAND NFR BLD MANUAL: 1 %
NEUTS BAND NFR BLD MANUAL: 1 %
NITRITE UR QL STRIP: NEGATIVE
NRBC BLD-RTO: 0 /100 WBC
NRBC BLD-RTO: 0 /100 WBC
OVALOCYTES BLD QL SMEAR: ABNORMAL
PCO2 BLDA: 32.8 MMHG (ref 35–45)
PH SMN: 7.38 [PH] (ref 7.35–7.45)
PH UR STRIP: 5 [PH] (ref 5–8)
PHOSPHATE SERPL-MCNC: 4.6 MG/DL (ref 2.7–4.5)
PLATELET # BLD AUTO: 313 K/UL (ref 150–350)
PLATELET # BLD AUTO: 324 K/UL (ref 150–350)
PMV BLD AUTO: 11.5 FL (ref 9.2–12.9)
PMV BLD AUTO: 11.6 FL (ref 9.2–12.9)
PO2 BLDA: 67 MMHG (ref 80–100)
POC BE: -5 MMOL/L
POC SATURATED O2: 93 % (ref 95–100)
POC TCO2: 21 MMOL/L (ref 23–27)
POCT GLUCOSE: 194 MG/DL (ref 70–110)
POCT GLUCOSE: 331 MG/DL (ref 70–110)
POIKILOCYTOSIS BLD QL SMEAR: SLIGHT
POLYCHROMASIA BLD QL SMEAR: ABNORMAL
POTASSIUM SERPL-SCNC: 4.1 MMOL/L (ref 3.5–5.1)
POTASSIUM SERPL-SCNC: 4.6 MMOL/L (ref 3.5–5.1)
PROCALCITONIN SERPL IA-MCNC: 0.4 NG/ML
PROT SERPL-MCNC: 6.8 G/DL (ref 6–8.4)
PROT SERPL-MCNC: 7.1 G/DL (ref 6–8.4)
PROT UR QL STRIP: ABNORMAL
RBC # BLD AUTO: 2.38 M/UL (ref 4.6–6.2)
RBC # BLD AUTO: 2.42 M/UL (ref 4.6–6.2)
RBC #/AREA URNS HPF: 0 /HPF (ref 0–4)
SAMPLE: ABNORMAL
SITE: ABNORMAL
SODIUM SERPL-SCNC: 136 MMOL/L (ref 136–145)
SODIUM SERPL-SCNC: 138 MMOL/L (ref 136–145)
SP GR UR STRIP: 1.01 (ref 1–1.03)
TROPONIN I SERPL DL<=0.01 NG/ML-MCNC: <0.006 NG/ML (ref 0–0.03)
URN SPEC COLLECT METH UR: ABNORMAL
UROBILINOGEN UR STRIP-ACNC: NEGATIVE EU/DL
WBC # BLD AUTO: 12.74 K/UL (ref 3.9–12.7)
WBC # BLD AUTO: 14.39 K/UL (ref 3.9–12.7)
WBC #/AREA URNS HPF: 0 /HPF (ref 0–5)
YEAST URNS QL MICRO: ABNORMAL

## 2020-08-24 PROCEDURE — 83605 ASSAY OF LACTIC ACID: CPT

## 2020-08-24 PROCEDURE — 99239 HOSP IP/OBS DSCHRG MGMT >30: CPT | Mod: ,,, | Performed by: HOSPITALIST

## 2020-08-24 PROCEDURE — 97116 GAIT TRAINING THERAPY: CPT

## 2020-08-24 PROCEDURE — 87040 BLOOD CULTURE FOR BACTERIA: CPT

## 2020-08-24 PROCEDURE — 83615 LACTATE (LD) (LDH) ENZYME: CPT

## 2020-08-24 PROCEDURE — 93005 ELECTROCARDIOGRAM TRACING: CPT

## 2020-08-24 PROCEDURE — 84100 ASSAY OF PHOSPHORUS: CPT

## 2020-08-24 PROCEDURE — 85007 BL SMEAR W/DIFF WBC COUNT: CPT | Mod: 91

## 2020-08-24 PROCEDURE — 82550 ASSAY OF CK (CPK): CPT

## 2020-08-24 PROCEDURE — 85379 FIBRIN DEGRADATION QUANT: CPT

## 2020-08-24 PROCEDURE — 99239 PR HOSPITAL DISCHARGE DAY,>30 MIN: ICD-10-PCS | Mod: ,,, | Performed by: HOSPITALIST

## 2020-08-24 PROCEDURE — 84145 PROCALCITONIN (PCT): CPT

## 2020-08-24 PROCEDURE — 85027 COMPLETE CBC AUTOMATED: CPT

## 2020-08-24 PROCEDURE — 83735 ASSAY OF MAGNESIUM: CPT

## 2020-08-24 PROCEDURE — 85027 COMPLETE CBC AUTOMATED: CPT | Mod: 91

## 2020-08-24 PROCEDURE — 63600175 PHARM REV CODE 636 W HCPCS: Performed by: HOSPITALIST

## 2020-08-24 PROCEDURE — 83880 ASSAY OF NATRIURETIC PEPTIDE: CPT

## 2020-08-24 PROCEDURE — 84484 ASSAY OF TROPONIN QUANT: CPT

## 2020-08-24 PROCEDURE — 25000003 PHARM REV CODE 250: Performed by: INTERNAL MEDICINE

## 2020-08-24 PROCEDURE — 94761 N-INVAS EAR/PLS OXIMETRY MLT: CPT

## 2020-08-24 PROCEDURE — 86140 C-REACTIVE PROTEIN: CPT

## 2020-08-24 PROCEDURE — 36415 COLL VENOUS BLD VENIPUNCTURE: CPT

## 2020-08-24 PROCEDURE — 80053 COMPREHEN METABOLIC PANEL: CPT

## 2020-08-24 PROCEDURE — 27000221 HC OXYGEN, UP TO 24 HOURS

## 2020-08-24 PROCEDURE — 36600 WITHDRAWAL OF ARTERIAL BLOOD: CPT

## 2020-08-24 PROCEDURE — 99900035 HC TECH TIME PER 15 MIN (STAT)

## 2020-08-24 PROCEDURE — 82728 ASSAY OF FERRITIN: CPT

## 2020-08-24 PROCEDURE — 93010 EKG 12-LEAD: ICD-10-PCS | Mod: ,,, | Performed by: INTERNAL MEDICINE

## 2020-08-24 PROCEDURE — 25000003 PHARM REV CODE 250: Performed by: NURSE PRACTITIONER

## 2020-08-24 PROCEDURE — 81000 URINALYSIS NONAUTO W/SCOPE: CPT

## 2020-08-24 PROCEDURE — 96374 THER/PROPH/DIAG INJ IV PUSH: CPT

## 2020-08-24 PROCEDURE — 99285 EMERGENCY DEPT VISIT HI MDM: CPT | Mod: 25

## 2020-08-24 PROCEDURE — 85007 BL SMEAR W/DIFF WBC COUNT: CPT

## 2020-08-24 PROCEDURE — 25000242 PHARM REV CODE 250 ALT 637 W/ HCPCS: Performed by: EMERGENCY MEDICINE

## 2020-08-24 PROCEDURE — 25000003 PHARM REV CODE 250: Performed by: HOSPITALIST

## 2020-08-24 PROCEDURE — 94640 AIRWAY INHALATION TREATMENT: CPT

## 2020-08-24 PROCEDURE — 63600175 PHARM REV CODE 636 W HCPCS: Performed by: EMERGENCY MEDICINE

## 2020-08-24 PROCEDURE — 25000003 PHARM REV CODE 250: Performed by: EMERGENCY MEDICINE

## 2020-08-24 PROCEDURE — 82803 BLOOD GASES ANY COMBINATION: CPT

## 2020-08-24 PROCEDURE — 80053 COMPREHEN METABOLIC PANEL: CPT | Mod: 91

## 2020-08-24 PROCEDURE — 27100098 HC SPACER

## 2020-08-24 PROCEDURE — 93010 ELECTROCARDIOGRAM REPORT: CPT | Mod: ,,, | Performed by: INTERNAL MEDICINE

## 2020-08-24 RX ORDER — FUROSEMIDE 40 MG/1
40 TABLET ORAL DAILY
Qty: 5 TABLET | Refills: 0 | Status: SHIPPED | OUTPATIENT
Start: 2020-08-25 | End: 2020-08-30

## 2020-08-24 RX ORDER — ALBUTEROL SULFATE 90 UG/1
2 AEROSOL, METERED RESPIRATORY (INHALATION) EVERY 6 HOURS PRN
Qty: 18 G | Refills: 0 | Status: SHIPPED | OUTPATIENT
Start: 2020-08-24 | End: 2021-06-22

## 2020-08-24 RX ORDER — METFORMIN HYDROCHLORIDE 500 MG/1
500 TABLET ORAL 2 TIMES DAILY WITH MEALS
Qty: 60 TABLET | Refills: 5 | Status: ON HOLD | OUTPATIENT
Start: 2020-08-24 | End: 2021-05-04 | Stop reason: HOSPADM

## 2020-08-24 RX ORDER — ACETAMINOPHEN 325 MG/1
650 TABLET ORAL
Status: COMPLETED | OUTPATIENT
Start: 2020-08-24 | End: 2020-08-24

## 2020-08-24 RX ORDER — ALBUTEROL SULFATE 90 UG/1
2 AEROSOL, METERED RESPIRATORY (INHALATION)
Status: COMPLETED | OUTPATIENT
Start: 2020-08-24 | End: 2020-08-24

## 2020-08-24 RX ORDER — CARVEDILOL 25 MG/1
25 TABLET ORAL 2 TIMES DAILY
Qty: 60 TABLET | Refills: 11 | Status: ON HOLD | OUTPATIENT
Start: 2020-08-24 | End: 2021-05-04 | Stop reason: HOSPADM

## 2020-08-24 RX ORDER — AMLODIPINE BESYLATE 10 MG/1
10 TABLET ORAL DAILY
Qty: 30 TABLET | Refills: 11 | Status: ON HOLD | OUTPATIENT
Start: 2020-08-25 | End: 2021-05-04 | Stop reason: HOSPADM

## 2020-08-24 RX ADMIN — ACETAMINOPHEN 650 MG: 325 TABLET ORAL at 10:08

## 2020-08-24 RX ADMIN — INSULIN DETEMIR 20 UNITS: 100 INJECTION, SOLUTION SUBCUTANEOUS at 09:08

## 2020-08-24 RX ADMIN — INSULIN ASPART 4 UNITS: 100 INJECTION, SOLUTION INTRAVENOUS; SUBCUTANEOUS at 11:08

## 2020-08-24 RX ADMIN — ENOXAPARIN SODIUM 70 MG: 80 INJECTION SUBCUTANEOUS at 09:08

## 2020-08-24 RX ADMIN — AMLODIPINE BESYLATE 10 MG: 10 TABLET ORAL at 09:08

## 2020-08-24 RX ADMIN — INSULIN HUMAN 6 UNITS: 100 INJECTION, SOLUTION PARENTERAL at 10:08

## 2020-08-24 RX ADMIN — ALBUTEROL SULFATE 2 PUFF: 90 AEROSOL, METERED RESPIRATORY (INHALATION) at 10:08

## 2020-08-24 RX ADMIN — CARVEDILOL 25 MG: 25 TABLET, FILM COATED ORAL at 09:08

## 2020-08-24 RX ADMIN — FUROSEMIDE 40 MG: 40 TABLET ORAL at 09:08

## 2020-08-24 RX ADMIN — INSULIN ASPART 15 UNITS: 100 INJECTION, SOLUTION INTRAVENOUS; SUBCUTANEOUS at 07:08

## 2020-08-24 RX ADMIN — INSULIN ASPART 15 UNITS: 100 INJECTION, SOLUTION INTRAVENOUS; SUBCUTANEOUS at 11:08

## 2020-08-24 NOTE — PLAN OF CARE
Patient discharged to [facility] in wheelchair with transporter. Wife downstairs in main entrance to  patient. Patient is saturating >95% on room air. VS stable. Denies pain at time of discharge. IV x2 discontinued. Tele removed. Patient received all discharge instructions and follow-up appointments. No further questions at this time. Patient received all new prescriptions and education on all medications/schedule. Patient demonstrated use to pulse ox and how to adequately measure his Spo2%. Updated patients wife on plan of care and discharge instructions. Patient left with all personal belongings.

## 2020-08-24 NOTE — DISCHARGE INSTRUCTIONS
You were treated for:  1. COVID-19 infection, the pandemic coronavirus infection  2. Diastolic congestive heart failure, probably caused by uncontrolled hypertension (high blood pressure)    You should quarantine yourself for 20 days after your diagnosis of COVID-19. (End quarantine on September 8.) This means you should wear a mask to prevent spreading COVID-19 to others, and sterilize surfaces you touch, cough on, sneeze on, or breathe on.    Your hypertension (high blood pressure) was out of control. This probably caused your heart to malfunction and caused fluid buildup in your lungs. This is called congestive heart failure.  Stop metoprolol and take carvedilol instead.  Stop clonidine and take amlodipine instead.  You were given furosemide, a diuretic, in the hospital. Continue taking this for another 5 days.    Your kidney function is abnormal. You have chronic kidney disease.  Because of the level of your kidney disease, decrease your metformin dose from 1000 mg to 500 mg. Take 500 mg twice a day.    Follow up with your doctor in a week. Bring this to show him. You can also show your sister this because she will understand it well.    Your test results:  Recent Labs   Lab 08/22/20  0413 08/23/20  0309 08/24/20  0339    138 138   K 4.4 4.5 4.1    105 105   CO2 20* 20* 21*   BUN 60* 66* 55*   CREATININE 2.3* 2.5* 2.2*   CALCIUM 9.3 9.0 8.7   PROT 7.7 7.3 6.8   BILITOT 0.4 0.3 0.3   ALKPHOS 192* 178* 153*   ALT 96* 93* 66*   AST 51* 37 21     Hemoglobin A1C   Date Value Ref Range Status   08/20/2020 7.3 (H) 4.0 - 5.6 % Final     Comment:     ADA Screening Guidelines:  5.7-6.4%  Consistent with prediabetes  >or=6.5%  Consistent with diabetes  High levels of fetal hemoglobin interfere with the HbA1C  assay. Heterozygous hemoglobin variants (HbS, HgC, etc)do  not significantly interfere with this assay.   However, presence of multiple variants may affect accuracy.       X-Ray Chest AP Portable 8/19/20:    FINDINGS: Moderate to severe opacification with ill-defined consolidation within the perihilar and lower lobes while nonspecific differential to include COVID-19 pneumoniae.  Clinical correlation and follow-up advised.  There is no large pleural effusion or pneumothorax.  Cardiomediastinal silhouette partially obscured by opacities.  Clinical correlation and follow-up advised     US Lower Extremity Veins Bilateral 8/20/20: FINDINGS: Right thigh veins: The common femoral, femoral, popliteal, and greater saphenous veins are patent and free of thrombus. The veins are normally compressible and have normal phasic flow and augmentation response.   Right calf veins: The visualized calf veins are patent.   Left thigh veins: The common femoral, femoral, popliteal, and greater saphenous veins are patent and free of thrombus. The veins are normally compressible and have normal phasic flow and augmentation response.   Left calf veins: The visualized calf veins are patent.   Impression:   No evidence of deep venous thrombosis in either lower extremity.     Transthoracic echocardiogram 8/23/2020:  · Normal left ventricular systolic function. The estimated ejection fraction is 65%.  · Grade I (mild) left ventricular diastolic dysfunction consistent with impaired relaxation.  · Concentric left ventricular hypertrophy.  · Normal right ventricular systolic function.  · Moderate left atrial enlargement.  · Normal central venous pressure (3 mmHg).

## 2020-08-24 NOTE — PLAN OF CARE
08/24/20 1141   Post-Acute Status   Post-Acute Authorization Other   Other Status No Post-Acute Service Needs     Patient discharging home today with no post acute needs.         Jo-Ann Garcia LMSW  [facility]   [id number]

## 2020-08-24 NOTE — PLAN OF CARE
Problem: Physical Therapy Goal  Goal: Physical Therapy Goal  Description: Goals to be met by: 2020    Patient will increase functional independence with mobility by performin. Gait  x 150 feet with Supervision - not met    Outcome: Ongoing, Progressing   Patient tolerated treatment well. Established POC and goals reviewed and remain appropriate. Plan is to continue to improve patient's functional mobility capabilities.      Nalini Mena, PT, DPT  2020

## 2020-08-24 NOTE — DISCHARGE SUMMARY
[facility] Medicine  Discharge Summary      Patient Name: Filippo Martinez  MRN: [id number]  Admission Date: 8/19/2020  Hospital Length of Stay: 5 days  Discharge Date and Time: 8/24/2020  1:27 PM  Attending Physician: Buster Webb MD   Discharging Provider: Buster Webb MD  Primary Care Provider: Rohan White MD      HPI:   Filippo Martinez is a 65 year old [place]American man with hypertension, hyperlipidemia, diabetes mellitus type 2, chronic kidney disease, history of prostate cancer status post prostatectomy on 5/19/2017. He lives in [facility]. He is . His sister is a family medicine physician. He is a [employer]. He is vegetarian. His primary care physician is Dr. Rohan White. His urologist is Dr. Spenser Nunez.               He presented to [facility] Emergency Department on 8/19/2020 with shortness of breath, dry cough, and generalized malaise since 8/14/2020. Shortness of breath worsened. He was tested for COVID-19 prior to his cataract surgery on 8/4/2020 and was negative at the time. In the emergency department, his oxygen saturation was 61%. He was placed on non-rebreather with improvement. He had tachycardia with heart rate of 113, tachypnea with respiratory rate in the 30s, and uncontrolled hypertension with blood pressure of 220/110. He takes clonidine 0.1 mg twice daily, doxazosin 2 mg every evening, irbesartan 300 mg every evening, and metoprolol tartate 50 mg twice daily for hypertension. COVID-19 test was positive. Chest X-ray showed diffuse infiltrates. Labs showed anemia with hemoglobin 7.3 g/dL (from 12 on 6/21/2017), hyperkalemia (potassium 5.4 mmol/L), metabolic acidosis (bicarbonate 20 mmol/L), acute kidney injury (BUN 31 mg/dL, creatinine 2.5 mg/dL, from 12 and 1.09 on 6/21/2017), elevated AST (159 U/L) and ALT (147 U/L), elevated CRP (208.7 mg/L), elevated ferritin (3845 ng/mL),  elevated D-dimer (3.48 mg/L). ABG showed pO2 55 on non-rebreather with 50% FiO2. ECG showed significant left ventricular hypertrophy. He was started on dexamethasone, prophylactic enoxaparin, ceftriaxone, and azithromycin. Due to [facility] not admitting COVID-19 patients, he was transferred to [facility] and admitted to Critical Care Medicine for hypertensive urgency.    Hospital Course:   He was put on nicardipine infusion and weaned off after starting his home doxazosin and adding amlodipine and carvedilol (to replace clonidine and metoprolol). Irbesartan was not restarted due to renal insufficiency. Antibiotics were given due to elevated procalcitonin. His hemoglobin A1c was only 7.3%, but on dexamethasone, his glucose was as high as >500 and he required insulin drip. Supplemental oxygen was changed to high flow nasal cannula. Enoxaparin was changed to therapeutic heparin infusion. He was given furosemide, and hypoxia subsequently improved significantly. He was weaned to low flow nasal cannula. Dexamethasone was stopped and hyperglycemia improved. He was transferred to Hospital Medicine Team R on 8/22/2020. Heparin infusion was changed to enoxaparin. D-dimer continued to decrease. Echocardiogram on 8/23/2020 showed left atrial enlargement, diastolic dysfunction, and left ventricular hypertrophy. Diuresis was continued. He completed 5 days of antibiotics. He was weaned off supplemental oxygen on 8/24/2020 with oxygen saturation of 91% on room air. Net fluid status was negative 4 liters. He was discharged home with medication adjustments as follows:    Clonidine was changed to amlodipine  Metoprolol was changed to carvedilol  Metformin dose was decreased from 1000 mg to 500 mg due to renal insufficiency  Furosemide 40 mg daily for another 5 days    Creatinine only improved to 2.2 mg/dL, which may suggest his chronic kidney disease advanced from stage 2 to stage 3  between labs in 2017 and recently, so he was advised to resume his irbesartan at discharge. Hemoglobin remained stable as well, and it is unknown when he developed anemia after his last labs in 2017.    His medical findings and recommendations were discussed with both his wife and his sister.     Consults: None    Final Active Diagnoses:    Diagnosis Date Noted POA    Acute diastolic congestive heart failure [I50.31] 08/23/2020 Yes    COVID-19 [U07.1] 08/19/2020 Yes    Anemia [D64.9] 08/19/2020 Yes    Chronic kidney disease [N18.9] 06/21/2017 Yes     Chronic    Type 2 diabetes mellitus with hyperglycemia, without long-term current use of insulin [E11.65] 10/07/2015 Yes     Chronic    Hyperlipidemia [E78.5] 10/07/2015 Yes     Chronic    Essential hypertension [I10] 2004 Yes     Chronic      Problems Resolved During this Admission:    Diagnosis Date Noted Date Resolved POA    PRINCIPAL PROBLEM:  Acute respiratory failure with hypoxia [J96.01] 08/19/2020 08/24/2020 Yes    Steroid-induced hyperglycemia [R73.9, T38.0X5A] 08/21/2020 08/22/2020 No    Hypertensive urgency [I16.0] 10/07/2015 08/21/2020 Yes       Discharged Condition: good    Disposition: Home or Self Care    Follow Up:  Follow-up Information     Rohan White MD In 1 week.    Specialties: Internal Medicine, Pediatrics  Contact information:  [address] [place] [address] [place] [id number]                 Patient Instructions:      Diet diabetic     COVID-19 Surveillance Program     Order Specific Question Answer Comments   Does patient have a smartphone? No    Does patient have the [facility] on their smartphone? No    While in surveillance program, will patient be using home oxygen? No      Notify your health care provider if you experience any of the following:  difficulty breathing or increased cough     Activity as tolerated       Significant Diagnostic Studies:   Recent Labs   Lab 08/22/20  0413 08/23/20  0309 08/24/20  0339   NA  139 138 138   K 4.4 4.5 4.1    105 105   CO2 20* 20* 21*   BUN 60* 66* 55*   CREATININE 2.3* 2.5* 2.2*   CALCIUM 9.3 9.0 8.7   PROT 7.7 7.3 6.8   BILITOT 0.4 0.3 0.3   ALKPHOS 192* 178* 153*   ALT 96* 93* 66*   AST 51* 37 21     Recent Labs   Lab 08/22/20  0413 08/23/20  0309 08/24/20  0339   WBC 13.01* 12.76* 12.74*   HGB 7.4* 7.6* 7.2*   HCT 23.7* 24.0* 22.6*   * 346 313     X-Ray Chest AP Portable 8/19/20:   FINDINGS: Moderate to severe opacification with ill-defined consolidation within the perihilar and lower lobes while nonspecific differential to include COVID-19 pneumoniae.  Clinical correlation and follow-up advised.  There is no large pleural effusion or pneumothorax.  Cardiomediastinal silhouette partially obscured by opacities.  Clinical correlation and follow-up advised   US Lower Extremity Veins Bilateral 8/20/20: FINDINGS: Right thigh veins: The common femoral, femoral, popliteal, and greater saphenous veins are patent and free of thrombus. The veins are normally compressible and have normal phasic flow and augmentation response.   Right calf veins: The visualized calf veins are patent.   Left thigh veins: The common femoral, femoral, popliteal, and greater saphenous veins are patent and free of thrombus. The veins are normally compressible and have normal phasic flow and augmentation response.   Left calf veins: The visualized calf veins are patent.   Impression:   No evidence of deep venous thrombosis in either lower extremity.   Transthoracic echocardiogram 8/23/2020:  · Normal left ventricular systolic function. The estimated ejection fraction is 65%.  · Grade I (mild) left ventricular diastolic dysfunction consistent with impaired relaxation.  · Concentric left ventricular hypertrophy.  · Normal right ventricular systolic function.  · Moderate left atrial enlargement.  · Normal central venous pressure (3 mmHg).     Medications:  Reconciled Home Medications:      Medication List       START taking these medications    albuterol 90 mcg/actuation inhaler  Commonly known as: PROVENTIL/VENTOLIN HFA  Inhale 2 puffs into the lungs every 6 (six) hours as needed for Wheezing.     amLODIPine 10 MG tablet  Commonly known as: NORVASC  Take 1 tablet (10 mg total) by mouth once daily.  Start taking on: August 25, 2020     carvediloL 25 MG tablet  Commonly known as: COREG  Take 1 tablet (25 mg total) by mouth 2 (two) times daily.     furosemide 40 MG tablet  Commonly known as: LASIX  Take 1 tablet (40 mg total) by mouth once daily. for 5 days  Start taking on: August 25, 2020     pulse oximeter device  Commonly known as: pulse oximeter  by Apply Externally route 2 (two) times a day. Use twice daily at 8 AM and 3 PM and record the value in [employer] as directed.        CHANGE how you take these medications    metFORMIN 500 MG tablet  Commonly known as: GLUCOPHAGE  Take 1 tablet (500 mg total) by mouth 2 (two) times daily with meals.  What changed:   · medication strength  · how much to take        CONTINUE taking these medications    aspirin 81 MG Chew  Take 81 mg by mouth once daily.     doxazosin 2 MG tablet  Commonly known as: CARDURA  Take 2 mg by mouth every evening.     glimepiride 4 MG tablet  Commonly known as: AMARYL  Take 4 mg by mouth before breakfast.     irbesartan 300 MG tablet  Commonly known as: AVAPRO  Take 300 mg by mouth every evening.     pravastatin 40 MG tablet  Commonly known as: PRAVACHOL  Take 40 mg by mouth once daily.        STOP taking these medications    cloNIDine 0.1 MG tablet  Commonly known as: CATAPRES     metoprolol tartrate 50 MG tablet  Commonly known as: LOPRESSOR     olmesartan 40 MG tablet  Commonly known as: BENICAR     omeprazole 20 MG capsule  Commonly known as: PriLOSEC     SITagliptan-metformin 100-1,000 mg Tm24  Commonly known as: JANUMET XR            Indwelling Lines/Drains at time of discharge: None    Time spent on the discharge of patient: 35 minutes  Patient  was seen and examined on the date of discharge and determined to be suitable for discharge.         Buster Webb MD  Department of Hospital Medicine  [facility]

## 2020-08-24 NOTE — NURSING
ASSUMED CARE OF PT. ASSESSMENT DONE/CHARTED. VSS NAD. UNEVENTFUL SHIFT REPORT GIVEN TO ONCOMING RN

## 2020-08-24 NOTE — PT/OT/SLP PROGRESS
Physical Therapy Treatment    Patient Name:  Filippo Martinez   MRN:  [id number]    Recommendations:     Discharge Recommendations:  home   Discharge Equipment Recommendations: none   Barriers to discharge: None    Assessment:     Filippo Martinez is a 65 y.o. male admitted with a medical diagnosis of Acute respiratory failure with hypoxia.  He presents with the following impairments/functional limitations:  weakness, impaired endurance, impaired functional mobilty, impaired balance, impaired cardiopulmonary response to activity. Patient tolerated session well. Near his baseline of functional mobility. Discussed patients pending d/c with no concerns or needed DME. Patient would continue to benefit from skilled PT services while in the hospital. At this time, upon d/c he is an appropriate candidate to return home.     Rehab Prognosis: Good; patient would benefit from acute skilled PT services to address these deficits and reach maximum level of function.    Recent Surgery: * No surgery found *      Plan:     During this hospitalization, patient to be seen 3 x/week to address the identified rehab impairments via gait training, therapeutic activities, therapeutic exercises, neuromuscular re-education and progress toward the following goals:    · Plan of Care Expires:  09/20/20    Subjective     Chief Complaint: none   Patient/Family Comments/goals: to go home  Pain/Comfort:  · Pain Rating 1: 0/10  · Pain Rating Post-Intervention 1: 0/10      Objective:     Communicated with RN prior to session.  Patient found HOB elevated with telemetry, blood pressure cuff, pulse ox (continuous) upon PT entry to room.     General Precautions: Standard, contact, airborne, droplet   Orthopedic Precautions:N/A   Braces: N/A     VSS throughout on RA     Functional Mobility:  · Bed Mobility:     · Rolling Left:  modified independence  · Scooting: modified independence  · Supine to Sit: modified independence  · Transfers:     · Sit to Stand:   modified independence with no AD  · Toilet Transfer: modified independence with  no AD  using  Step Transfer  · Gait: 15ftx2 to bathroom for toileting for Sup and no AD  ·  no LOB or notable gait deviations   · Balance: good throughout, no LOB with any dynamic mobility       AM-PAC 6 CLICK MOBILITY  Turning over in bed (including adjusting bedclothes, sheets and blankets)?: 4  Sitting down on and standing up from a chair with arms (e.g., wheelchair, bedside commode, etc.): 4  Moving from lying on back to sitting on the side of the bed?: 4  Moving to and from a bed to a chair (including a wheelchair)?: 4  Need to walk in hospital room?: 3  Climbing 3-5 steps with a railing?: 3  Basic Mobility Total Score: 22       Therapeutic Activities and Exercises:  -Patient educated on the continued role and goal of PT  -Questions and concerns answered within the the PT scope of practice.   -White board updated in patient room to reflect level of assistance needed with nursing.     Patient left sitting EOB  with all lines intact, call button in reach and RN notified..    GOALS:   Multidisciplinary Problems     Physical Therapy Goals        Problem: Physical Therapy Goal    Goal Priority Disciplines Outcome Goal Variances Interventions   Physical Therapy Goal     PT, PT/OT Ongoing, Progressing     Description: Goals to be met by: 2020    Patient will increase functional independence with mobility by performin. Gait  x 150 feet with Supervision - not met                     Time Tracking:     PT Received On: 20  PT Start Time: 1106     PT Stop Time: 1118  PT Total Time (min): 12 min     Billable Minutes: Gait Training 12    Treatment Type: Treatment  PT/PTA: PT     PTA Visit Number: 0     Nalini Mena, PT  2020

## 2020-08-24 NOTE — PLAN OF CARE
Patient medically ready for discharge to home. Any necessary transport setup by . This CM s requested necessary follow-up appointments. Family/patient aware of discharge.    hospital follow-up ([phone]) -- OFFICE TO SCHEDULE APPOINTMENT IN 7 - 10 DAYS AND WILL NOTIFY PATIENT OF APPOINTMENT DATE AND TIME    Malu Ramires RN  Case Management  Ext: [phone]  08/24/2020  2:33 PM        08/24/20 1431   Final Note   Assessment Type Final Discharge Note   Anticipated Discharge Disposition Home   What phone number can be called within the next 1-3 days to see how you are doing after discharge? [phone]   Hospital Follow Up  Appt(s) scheduled? No  (hospital follow-up ([phone]) -- OFFICE TO SCHEDULE APPOINTMENT IN 7 - 10 DAYS AND WILL NOTIFY PATIENT OF APPOINTMENT DATE AND TIME)   Discharge plans and expectations educations in teach back method with documentation complete? Yes  (Per bedside nurse)   Right Care Referral Info   Post Acute Recommendation No Care   Post-Acute Status   Other Status No Post-Acute Service Needs   Discharge Delays None known at this time

## 2020-08-24 NOTE — TELEPHONE ENCOUNTER
Covid-19 surveillance program welcome call attempted, unable to enroll in program as patient speaks French only. Patient's wife speaks partial English, using , conducted triage. Assessment as follows: O2 is 90%, T 100.5, HR 95, Fever/Chills Y/N, Cough Y, SOB denies both, but admits to using accessory muscles to breathe. Transferred patient to [facility] on call for telephone consultation.     Called patient to review COVID-19 Surveillance Program enrollment process.    Smartphone: Yes     [facility]: portal pending     Program consent: Unable to consent, French speaking    Pulse oximeter status: Yes, working     Verified emergency contacts: Yes     Program Overview: Reviewed , no response process, and importance of correct emergency contacts in event that well-being check is warranted. Encouraged patient to call [phone] 24/7 to speak with an OnCall nurse, if needed.    Patient had no further questions.      Reason for Disposition   Fever present > 3 days (72 hours)    Additional Information   Negative: SEVERE difficulty breathing (e.g., struggling for each breath, speaks in single words)   Negative: Difficult to awaken or acting confused (e.g., disoriented, slurred speech)   Negative: Bluish (or gray) lips or face now   Negative: Shock suspected (e.g., cold/pale/clammy skin, too weak to stand, low BP, rapid pulse)   Negative: Sounds like a life-threatening emergency to the triager   Negative: [1] COVID-19 exposure AND [2] NO symptoms   Negative: COVID-19 and Breastfeeding, questions about   Negative: [1] Adult with possible COVID-19 symptoms AND [2] triager concerned about severity of symptoms or other causes   Negative: SEVERE or constant chest pain or pressure (Exception: mild central chest pain, present only when coughing)   Negative: MODERATE difficulty breathing (e.g., speaks in phrases, SOB even at rest, pulse 100-120)   Negative: MILD difficulty breathing (e.g., minimal/no  SOB at rest, SOB with walking, pulse <100)   Negative: Chest pain   Negative: Patient sounds very sick or weak to the triager   Negative: Fever > 103 F (39.4 C)   Negative: [1] Fever > 101 F (38.3 C) AND [2] age > 60   Negative: [1] Fever > 100.0 F (37.8 C) AND [2] bedridden (e.g., nursing home patient, CVA, chronic illness, recovering from surgery)   Negative: HIGH RISK patient (e.g., age > 64 years, diabetes, heart or lung disease, weak immune system)   Negative: [1] COVID-19 infection suspected by caller or triager AND [2] mild symptoms (cough, fever, or others) AND [3] no complications or SOB    Protocols used: CORONAVIRUS (COVID-19) DIAGNOSED OR [id number]

## 2020-08-25 VITALS
HEIGHT: 68 IN | WEIGHT: 160 LBS | DIASTOLIC BLOOD PRESSURE: 78 MMHG | TEMPERATURE: 99 F | RESPIRATION RATE: 21 BRPM | OXYGEN SATURATION: 98 % | BODY MASS INDEX: 24.25 KG/M2 | SYSTOLIC BLOOD PRESSURE: 147 MMHG | HEART RATE: 82 BPM

## 2020-08-25 NOTE — ED PROVIDER NOTES
Encounter Date: 8/24/2020       History     Chief Complaint   Patient presents with    COVID-19 Concerns     Daughter reports a home O2 sat of 92% and temp of 100.7 around 6pm tonight. +COVID. Pt reports difficulty to breathe when he lies down. Took Tylenol withouHx of CHF, DM2. O2 sats in triage is 92%. Temp in triage is 98.7     65-year-old male presents via family with elevated temp (100.7F) and low pulse ox (91%) while lying in bed prior to arrival. Patient was just discharged today from [facility] after an admission (8/19/20-8/24/20) for COVID-19, during which he was also found to have CKD and HTN and poorly controlled DM2.     Macedonian Creole speaking. Creole-speaking PCA and Language Line used as translators.     PMH: DM2, HTN, DLD, CKD, prostate cancer s/p prostatectomy    PSH: prostatectomy 5/19/2017    PCP: Dr. Rohan White  Urologist: Dr. Spenser Nunez    Transthoracic echocardiogram 8/23/2020:  · Normal left ventricular systolic function. The estimated ejection fraction is 65%.  · Grade I (mild) left ventricular diastolic dysfunction consistent with impaired relaxation.  · Concentric left ventricular hypertrophy.  · Normal right ventricular systolic function.  · Moderate left atrial enlargement.  · Normal central venous pressure (3 mmHg).        Review of patient's allergies indicates:  No Known Allergies  Past Medical History:   Diagnosis Date    Diabetes mellitus, type 2     Hyperlipidemia     Hypertension 2004    Prostate cancer      Past Surgical History:   Procedure Laterality Date    CATARACT EXTRACTION W/  INTRAOCULAR LENS IMPLANT Right 07/24/2020    CATARACT EXTRACTION W/  INTRAOCULAR LENS IMPLANT Left 08/04/2020    PROSTATECTOMY  05/19/2017     Family History   Problem Relation Age of Onset    Diabetes Mother     Hyperlipidemia Mother     Diabetes Father     Hyperlipidemia Father     Asthma Father      Social History     Tobacco Use    Smoking status: Never Smoker     Smokeless tobacco: Never Used   Substance Use Topics    Alcohol use: No    Drug use: No     Review of Systems   Constitutional: Positive for fever.   HENT: Negative for rhinorrhea.    Eyes: Negative for photophobia.   Respiratory: Negative for cough.    Cardiovascular: Negative for chest pain.   Gastrointestinal: Negative for abdominal pain.   Genitourinary: Negative for flank pain.   Musculoskeletal: Negative for neck stiffness.   Skin: Negative for rash.   Neurological: Negative for syncope.       Physical Exam     Initial Vitals [08/24/20 2007]   BP Pulse Resp Temp SpO2   (!) 161/74 99 18 98.7 °F (37.1 °C) (!) 93 %      MAP       --         Physical Exam    Nursing note and vitals reviewed.  Constitutional: He appears well-developed and well-nourished. He is not diaphoretic.   Awake, alert, nontoxic. Speaking in complete sentences with oxygen in place by nasal canula.   HENT:   Head: Normocephalic and atraumatic.   Eyes: Conjunctivae and EOM are normal. Pupils are equal, round, and reactive to light.   Neck: Normal range of motion. Neck supple.   Cardiovascular: Normal rate, regular rhythm, normal heart sounds and intact distal pulses.   No murmur heard.  Pulmonary/Chest: He has no wheezes. He has rhonchi (scattered). He has no rales.   Abdominal: Soft. There is no abdominal tenderness.   Musculoskeletal: Normal range of motion. No tenderness or edema.   Neurological: He is alert and oriented to person, place, and time. He has normal strength.   Moving all extremities   Skin: Skin is warm and dry.   Psychiatric: He has a normal mood and affect.         ED Course   Procedures  Labs Reviewed   CBC W/ AUTO DIFFERENTIAL - Abnormal; Notable for the following components:       Result Value    WBC 14.39 (*)     RBC 2.42 (*)     Hemoglobin 7.2 (*)     Hematocrit 22.2 (*)     Gran% 76.0 (*)     Lymph% 15.0 (*)     All other components within normal limits   COMPREHENSIVE METABOLIC PANEL - Abnormal; Notable for the  following components:    CO2 20 (*)     Glucose 483 (*)     BUN, Bld 56 (*)     Creatinine 2.9 (*)     Albumin 2.9 (*)     Alkaline Phosphatase 171 (*)     ALT 65 (*)     eGFR if  25 (*)     eGFR if non  22 (*)     All other components within normal limits    Narrative:     Glucose    critical result(s) called and verbal readback obtained   from   Jennifer [place] [facility] [place] 22:12   C-REACTIVE PROTEIN - Abnormal; Notable for the following components:    CRP 33.8 (*)     All other components within normal limits   FERRITIN - Abnormal; Notable for the following components:    Ferritin 944 (*)     All other components within normal limits   LACTATE DEHYDROGENASE - Abnormal; Notable for the following components:     (*)     All other components within normal limits   PROCALCITONIN - Abnormal; Notable for the following components:    Procalcitonin 0.40 (*)     All other components within normal limits   URINALYSIS, REFLEX TO URINE CULTURE - Abnormal; Notable for the following components:    Protein, UA 2+ (*)     Glucose, UA 3+ (*)     All other components within normal limits    Narrative:     Specimen Source->Urine   URINALYSIS MICROSCOPIC - Abnormal; Notable for the following components:    Bacteria Few (*)     Granular Casts, UA 2 (*)     All other components within normal limits    Narrative:     Specimen Source->Urine   ISTAT PROCEDURE - Abnormal; Notable for the following components:    POC PCO2 32.8 (*)     POC PO2 67 (*)     POC HCO3 19.5 (*)     POC SATURATED O2 93 (*)     POC TCO2 21 (*)     All other components within normal limits   CULTURE, BLOOD    Narrative:     Aerobic and anaerobic   CULTURE, BLOOD    Narrative:     Aerobic and anaerobic   CK   LACTIC ACID, PLASMA   TROPONIN I   B-TYPE NATRIURETIC PEPTIDE     EKG Readings: (Independently Interpreted)   20:56: NSR, HR 93. Normal axis. TWI in III. No STEMI.      ECG Results          EKG 12-lead (In process)  Result  time 08/25/20 06:04:36    In process by Interface, Lab In [facility] (08/25/20 06:04:36)                 Narrative:    Test Reason : R68.89,    Vent. Rate : 093 BPM     Atrial Rate : 093 BPM     P-R Int : 120 ms          QRS Dur : 094 ms      QT Int : 360 ms       P-R-T Axes : 058 057 -07 degrees     QTc Int : 447 ms    Normal sinus rhythm  Voltage criteria for left ventricular hypertrophy  Nonspecific ST abnormality  Abnormal QRS-T angle, consider primary T wave abnormality  Abnormal ECG  When compared with ECG of 19-AUG-2020 13:29,  No significant change was found    Referred By: [email or web address]   SELF           Confirmed By:                   In process by Interface, Lab In [facility] (08/25/20 05:55:11)                 Narrative:    Test Reason : R68.89,    Vent. Rate : 093 BPM     Atrial Rate : 093 BPM     P-R Int : 120 ms          QRS Dur : 094 ms      QT Int : 360 ms       P-R-T Axes : 058 057 -07 degrees     QTc Int : 447 ms    Normal sinus rhythm  Voltage criteria for left ventricular hypertrophy  Nonspecific ST abnormality  Abnormal QRS-T angle, consider primary T wave abnormality  Abnormal ECG  When compared with ECG of 19-AUG-2020 13:29,  No significant change was found    Referred By: [email or web address]   SELF           Confirmed By:                             Imaging Results          X-Ray Chest AP Portable (Final result)  Result time 08/24/20 21:25:45    Final result by Huma Chen MD (08/24/20 21:25:45)                 Impression:      Improvement in bilateral airspace opacities.      Electronically signed by: Huma Chen  Date:    08/24/2020  Time:    21:25             Narrative:    EXAMINATION:  AP PORTABLE CHEST    CLINICAL HISTORY:  Suspected Covid-19 Virus Infection;    TECHNIQUE:  AP portable chest radiograph was submitted.    COMPARISON:  08/19/2020    FINDINGS:  AP portable chest radiograph demonstrates a cardiac silhouette within normal limits.  There has been improvement in bilateral airspace  opacities.  There is no pneumothorax or pleural effusion.                              X-Rays:   Independently Interpreted Readings:   Other Readings:  CXR bilateral ground glass opacities     Medical Decision Making:   History:   Old Medical Records: I decided to obtain old medical records.  Old Records Summarized: records from previous admission(s).  Initial Assessment:   65 y.o. male s/p COVID admission returns with low room air pulse ox at home (91%) and temp 100.7F.  Differential Diagnosis:   Ddx includes COVID-19, bacterial superinfection, ACS, CHF, PE, other.  Independently Interpreted Test(s):   I have ordered and independently interpreted X-rays - see prior notes.  I have ordered and independently interpreted EKG Reading(s) - see prior notes  Clinical Tests:   Lab Tests: Reviewed and Ordered  Radiological Study: Ordered and Reviewed  Medical Tests: Ordered and Reviewed  ED Management:  EKG no STEMI.     CXR bilateral ground glass opacities but improved from prior.    Labs: WBC 14.39. Hgb 7.2, c/w prior. BUN 56 / creatinine 2.9, minimally worse than prior. Procalcitonin, ESR, CRP improved from prior. UA without infection. Cardiac enzymes reassuring.    Sats with ambulation 91% on room air. HR < 100, however.    ABG on room air: PaO2 67.    I discussed workup with patient again via translators (both patient care assistant and language line). I explained that low oxygen levels are concerning and that patient may require re-admission to monitor further. (Tachypnea and slight fever here are also concerning.) I explained that patient may deteriorate further. Patient wished to be discharged home on oxygen which I explained was not possible at this time (it requires social work coordination). I explained that we needed to transfer patient to [facility] for admit. Patient declined. I have stressed that he needs to return if he feels worse or if his pulse ox stays below 91%. He voiced understanding.     AMA signed and  witnessed by RN.                                  Clinical Impression:       ICD-10-CM ICD-9-CM   1. COVID-19 virus infection  U07.1    2. Hypoxia  R09.02 799.02   3. Acute renal failure superimposed on chronic kidney disease, unspecified CKD stage, unspecified acute renal failure type  N17.9 584.9    N18.9 585.9   4. Hyperglycemia  R73.9 790.29   5. Fever, unspecified fever cause  R50.9 780.60   6. Anemia, unspecified type  D64.9 285.9   7. Tachypnea  R06.82 786.06             ED Disposition Condition    AMA                           Ayse Velez MD  08/25/20 0936

## 2020-08-25 NOTE — ED NOTES
Dr. Velez discussed at length the risk of going home, discussed coming back if anything changes or gets worse. Discussed need to follow up with pcp and nephrology for kidney disease. Pt verbalized understanding and has no other questions at this time.

## 2020-08-25 NOTE — TELEPHONE ENCOUNTER
Message from Nurses line to triage patient regarding abnormal VS.  Nurse was trying to enroll patient in the covid monitoring program, but patient's natural language is [place]. We are not able to enroll him in the program at this time due to signing of consent  And we do not have consent in his native language. Nurse aparna needed me to explain that to the patient and wife with interpretor online     Spoke to Wife, Interpretor on line regarding patient's DC status.   Recently DC from [facility] today after 4 days in hospital. Due to the language barrier, there were not instructions on medical care, medications to take and not take and instructions on albuterol inhaler instructions.   Patient had O2 level at this time of 92% HR 92-93  Temp 100.4 - she had just given him 2 tylenol.    There were multiple med changes - reviewed each one of these with the patient's wife and interpretor on line.  Obtained instructions for inhalers for patient.  Emailed to her home email  [email or web address] - She did receive this information. Also copy of medication list for patient.      Patient sees Dr White in clinic, which is outside of [facility].      Patient still with SOB and some work of breathing per the wife.  She is concerned about his O2 sat dropping over night since it is still 92% without sleeping.  He was not sent home with any oxygen.  Last o2 91 and 94% on room air per hospital note.     ED precautions about increased HR,decreased O2 sat and fever happens.   She voiced understanding     Patient was also admitted for Hypertensive crisis. Renal disease - CR2.7, diabetes, HTN, Covid with resp failure. Acute diastolic CHF.  Increased blood sugars related to steroids.    Discussed with wife in detail that the patient needed to call his PCP tomorrow and be seen this week as soon as possible for follow up due to the complexity of his case and his care.      He can be enrolled in the symptoms monitoring program but will  wait at this time and allow patient to return to his PCP for follow up.      Discussed in detail that if he has any changes in his vital signs  That are worrisome - gave parameters, then she should take him back to ER.

## 2020-08-25 NOTE — TELEPHONE ENCOUNTER
Pt was seen in ED last night and discharged home. Added [id number] to chart for symptom monitoring, as discussed during [facility] telephone encounter last night.

## 2020-08-29 LAB
BACTERIA BLD CULT: NORMAL
BACTERIA BLD CULT: NORMAL

## 2020-09-22 ENCOUNTER — HOSPITAL ENCOUNTER (EMERGENCY)
Facility: HOSPITAL | Age: 65
Discharge: HOME | End: 2020-09-22
Attending: EMERGENCY MEDICINE
Payer: MEDICARE

## 2020-09-22 VITALS
RESPIRATION RATE: 21 BRPM | TEMPERATURE: 99 F | SYSTOLIC BLOOD PRESSURE: 170 MMHG | BODY MASS INDEX: 24.25 KG/M2 | HEART RATE: 101 BPM | OXYGEN SATURATION: 98 % | HEIGHT: 68 IN | DIASTOLIC BLOOD PRESSURE: 90 MMHG | WEIGHT: 160 LBS

## 2020-09-22 DIAGNOSIS — D64.9 ANEMIA, UNSPECIFIED TYPE: ICD-10-CM

## 2020-09-22 DIAGNOSIS — R73.9 HYPERGLYCEMIA: Primary | ICD-10-CM

## 2020-09-22 LAB
ABO + RH BLD: NORMAL
ALBUMIN SERPL BCP-MCNC: 4.2 G/DL (ref 3.5–5.2)
ALP SERPL-CCNC: 109 U/L (ref 55–135)
ALT SERPL W/O P-5'-P-CCNC: 39 U/L (ref 10–44)
ANION GAP SERPL CALC-SCNC: 12 MMOL/L (ref 8–16)
APTT BLDCRRT: 28.2 SEC (ref 21–32)
AST SERPL-CCNC: 26 U/L (ref 10–40)
BACTERIA #/AREA URNS HPF: NORMAL /HPF
BASOPHILS # BLD AUTO: 0.02 K/UL (ref 0–0.2)
BASOPHILS NFR BLD: 0.4 % (ref 0–1.9)
BILIRUB SERPL-MCNC: 0.4 MG/DL (ref 0.1–1)
BILIRUB UR QL STRIP: NEGATIVE
BLD GP AB SCN CELLS X3 SERPL QL: NORMAL
BUN SERPL-MCNC: 19 MG/DL (ref 8–23)
CALCIUM SERPL-MCNC: 9.2 MG/DL (ref 8.7–10.5)
CHLORIDE SERPL-SCNC: 104 MMOL/L (ref 95–110)
CLARITY UR: CLEAR
CO2 SERPL-SCNC: 25 MMOL/L (ref 23–29)
COLOR UR: ABNORMAL
CREAT SERPL-MCNC: 1.8 MG/DL (ref 0.5–1.4)
CTP QC/QA: YES
DIFFERENTIAL METHOD: ABNORMAL
EOSINOPHIL # BLD AUTO: 0.1 K/UL (ref 0–0.5)
EOSINOPHIL NFR BLD: 2.5 % (ref 0–8)
ERYTHROCYTE [DISTWIDTH] IN BLOOD BY AUTOMATED COUNT: 13.5 % (ref 11.5–14.5)
EST. GFR  (AFRICAN AMERICAN): 45 ML/MIN/1.73 M^2
EST. GFR  (NON AFRICAN AMERICAN): 39 ML/MIN/1.73 M^2
GLUCOSE SERPL-MCNC: 326 MG/DL (ref 70–110)
GLUCOSE UR QL STRIP: ABNORMAL
HCT VFR BLD AUTO: 26.8 % (ref 40–54)
HGB BLD-MCNC: 9 G/DL (ref 14–18)
HGB UR QL STRIP: NEGATIVE
HYALINE CASTS #/AREA URNS LPF: 0 /LPF
IMM GRANULOCYTES # BLD AUTO: 0.08 K/UL (ref 0–0.04)
IMM GRANULOCYTES NFR BLD AUTO: 1.4 % (ref 0–0.5)
INR PPP: 0.9 (ref 0.8–1.2)
KETONES UR QL STRIP: NEGATIVE
LEUKOCYTE ESTERASE UR QL STRIP: NEGATIVE
LYMPHOCYTES # BLD AUTO: 1.7 K/UL (ref 1–4.8)
LYMPHOCYTES NFR BLD: 28.9 % (ref 18–48)
MCH RBC QN AUTO: 31.5 PG (ref 27–31)
MCHC RBC AUTO-ENTMCNC: 33.6 G/DL (ref 32–36)
MCV RBC AUTO: 94 FL (ref 82–98)
MICROSCOPIC COMMENT: NORMAL
MONOCYTES # BLD AUTO: 0.5 K/UL (ref 0.3–1)
MONOCYTES NFR BLD: 8.6 % (ref 4–15)
NEUTROPHILS # BLD AUTO: 3.3 K/UL (ref 1.8–7.7)
NEUTROPHILS NFR BLD: 58.2 % (ref 38–73)
NITRITE UR QL STRIP: NEGATIVE
NRBC BLD-RTO: 0 /100 WBC
PH UR STRIP: 7 [PH] (ref 5–8)
PLATELET # BLD AUTO: 244 K/UL (ref 150–350)
PMV BLD AUTO: 11.8 FL (ref 9.2–12.9)
POCT GLUCOSE: 285 MG/DL (ref 70–110)
POTASSIUM SERPL-SCNC: 4.4 MMOL/L (ref 3.5–5.1)
PROT SERPL-MCNC: 7.8 G/DL (ref 6–8.4)
PROT UR QL STRIP: ABNORMAL
PROTHROMBIN TIME: 9.9 SEC (ref 9–12.5)
RBC # BLD AUTO: 2.86 M/UL (ref 4.6–6.2)
RBC #/AREA URNS HPF: 0 /HPF (ref 0–4)
SARS-COV-2 RDRP RESP QL NAA+PROBE: NEGATIVE
SODIUM SERPL-SCNC: 141 MMOL/L (ref 136–145)
SP GR UR STRIP: 1.01 (ref 1–1.03)
SQUAMOUS #/AREA URNS HPF: 0 /HPF
URN SPEC COLLECT METH UR: ABNORMAL
UROBILINOGEN UR STRIP-ACNC: NEGATIVE EU/DL
WBC # BLD AUTO: 5.71 K/UL (ref 3.9–12.7)
WBC #/AREA URNS HPF: 1 /HPF (ref 0–5)
YEAST URNS QL MICRO: NORMAL

## 2020-09-22 PROCEDURE — 85610 PROTHROMBIN TIME: CPT

## 2020-09-22 PROCEDURE — 81000 URINALYSIS NONAUTO W/SCOPE: CPT

## 2020-09-22 PROCEDURE — 82962 GLUCOSE BLOOD TEST: CPT

## 2020-09-22 PROCEDURE — 96374 THER/PROPH/DIAG INJ IV PUSH: CPT

## 2020-09-22 PROCEDURE — 86850 RBC ANTIBODY SCREEN: CPT

## 2020-09-22 PROCEDURE — U0002 COVID-19 LAB TEST NON-CDC: HCPCS | Performed by: EMERGENCY MEDICINE

## 2020-09-22 PROCEDURE — 25000003 PHARM REV CODE 250: Performed by: EMERGENCY MEDICINE

## 2020-09-22 PROCEDURE — 85025 COMPLETE CBC W/AUTO DIFF WBC: CPT

## 2020-09-22 PROCEDURE — 63600175 PHARM REV CODE 636 W HCPCS: Performed by: EMERGENCY MEDICINE

## 2020-09-22 PROCEDURE — 85730 THROMBOPLASTIN TIME PARTIAL: CPT

## 2020-09-22 PROCEDURE — 99284 EMERGENCY DEPT VISIT MOD MDM: CPT | Mod: 25

## 2020-09-22 PROCEDURE — 80053 COMPREHEN METABOLIC PANEL: CPT

## 2020-09-22 RX ORDER — FUROSEMIDE 20 MG/1
20 TABLET ORAL 2 TIMES DAILY
Status: ON HOLD | COMMUNITY
End: 2021-05-04 | Stop reason: HOSPADM

## 2020-09-22 RX ORDER — MV-MIN/FOLIC/K1/LYCOPEN/LUTEIN 300-60 MCG
1 TABLET ORAL DAILY
Qty: 90 TABLET | Refills: 3 | Status: SHIPPED | OUTPATIENT
Start: 2020-09-22

## 2020-09-22 RX ORDER — FERROUS GLUCONATE 324(38)MG
324 TABLET ORAL
Qty: 120 TABLET | Refills: 2 | Status: SHIPPED | OUTPATIENT
Start: 2020-09-22

## 2020-09-22 RX ADMIN — INSULIN HUMAN 6 UNITS: 100 INJECTION, SOLUTION PARENTERAL at 07:09

## 2020-09-22 RX ADMIN — SODIUM CHLORIDE 1000 ML: 0.9 INJECTION, SOLUTION INTRAVENOUS at 06:09

## 2020-09-22 NOTE — FIRST PROVIDER EVALUATION
Emergency Department TeleTriage Encounter Note      CHIEF COMPLAINT    Chief Complaint   Patient presents with    Abnormal Lab     Patient reports that he was sent here by his PCp because he had an abnormal lab value, hemoglobin is 7.6 from 10 on his last test. Patient denies black or bloody stools, hematuria. Denies hx of transfusion       VITAL SIGNS   Initial Vitals [09/22/20 1634]   BP Pulse Resp Temp SpO2   (!) 179/99 104 18 98.5 °F (36.9 °C) 97 %      MAP       --            ALLERGIES    Review of patient's allergies indicates:  No Known Allergies    PROVIDER TRIAGE NOTE  This is a teletriage evaluation of a 65 y.o. male presenting to the ED with c/o low H&H on routine labs. family reports hemoglobin drop from 10-7.6.. Initial orders will be placed and care will be transferred to an alternate provider when patient is roomed for a full evaluation. Any additional orders and the final disposition will be determined by that provider.         ORDERS  Labs Reviewed   CBC W/ AUTO DIFFERENTIAL   COMPREHENSIVE METABOLIC PANEL   PROTIME-INR   TYPE & SCREEN       ED Orders (720h ago, onward)    Start Ordered     Status Ordering Provider    09/22/20 1639 09/22/20 1638  Saline lock IV (18 ga. or larger)  Once      Ordered KYLE AGUILERA    09/22/20 1639 09/22/20 1638  2nd Saline lock IV (18 ga. or larger)  Once      Ordered KYLE AGUILERA    09/22/20 1639 09/22/20 1638  NPO (Ice Chips)  Once      Ordered KYLE AGUILERA    09/22/20 1639 09/22/20 1638  CBC auto differential  STAT  Collect    Ordered KYLE AGUILERA    09/22/20 1639 09/22/20 1638  Comprehensive metabolic panel  STAT  Collect    Ordered KYLE AGUILERA    09/22/20 1639 09/22/20 1638  Protime-INR  STAT  Collect    Ordered KYLE AGUILERA    09/22/20 1639 09/22/20 1638  Type & Screen  STAT      Ordered KYLE AGUILERA    09/22/20 1639 09/22/20 1638  Vital signs  Once      Ordered KYLE AGUILERA    09/22/20 1639 09/22/20 1638  Cardiac Monitoring -  Adult  Continuous     Comments: Notify Physician If:    Ordered KYLE AGUILERA    09/22/20 1639 09/22/20 1638  Pulse Oximetry Continuous  Continuous      Ordered KYLE AGUILERA            Virtual Visit Note: The provider triage portion of this emergency department evaluation and documentation was performed via [employer], a HIPAA-compliant telemedicine application, in concert with a tele-presenter in the room. A face to face patient evaluation with one of my colleagues will occur once the patient is placed in an emergency department room.      DISCLAIMER: This note was prepared with [employer] voice recognition transcription software. Garbled syntax, mangled pronouns, and other bizarre constructions may be attributed to that software system.

## 2020-09-22 NOTE — ED TRIAGE NOTES
Pt presented to ER VIA PERSONAL TRANSPORTATION AS PER primary care dr  RECOMMENDATION because of blood test results.Pt denies chest pain,nausea or vomiting.Pt denies headache,sore throat,fever or chills.Pt AAOX4.pT REPORTS CHEST PAIN WHEN HUNGRY.

## 2020-09-22 NOTE — ED PROVIDER NOTES
Encounter Date: 9/22/2020    SCRIBE #1 NOTE: I, Nicholas Villalta, am scribing for, and in the presence of,  Clark Godwin MD. I have scribed the following portions of the note - Other sections scribed: HPI, ROS.       History     Chief Complaint   Patient presents with    Abnormal Lab     Patient reports that he was sent here by his PCp because he had an abnormal lab value, hemoglobin is 7.6 from 10 on his last test. Patient denies black or bloody stools, hematuria. Denies hx of transfusion     CC: Abnormal Lab    HPI: This 66 y/o male with PMHx HTN, HLD, type 2 DM, prostate CA presents to the ED following PCP advisory for emergent evaluation of abnormal labs (low hemoglobin value 7.6 from 10).  Upon history taking, pt states that he is feeling fine.  He reports mild bilateral leg swelling lately.  He states that he sometimes feels midsternal CP whenever he is hungry which resolves after he eats food.  He denies any melena or blood in stool.  No fever, chills, sore throat, ear pain, cough, or SOB.  No abd pain, N/V/D.  No HA, visual disturbances.  No dysuria.  No past h/o cardiac issues, liver disease, or troubles with BM.    The history is provided by the patient and medical records. No  was used.     Review of patient's allergies indicates:  No Known Allergies  Past Medical History:   Diagnosis Date    Diabetes mellitus, type 2     Hyperlipidemia     Hypertension 2004    Prostate cancer      Past Surgical History:   Procedure Laterality Date    CATARACT EXTRACTION W/  INTRAOCULAR LENS IMPLANT Right 07/24/2020    CATARACT EXTRACTION W/  INTRAOCULAR LENS IMPLANT Left 08/04/2020    PROSTATECTOMY  05/19/2017     Family History   Problem Relation Age of Onset    Diabetes Mother     Hyperlipidemia Mother     Diabetes Father     Hyperlipidemia Father     Asthma Father      Social History     Tobacco Use    Smoking status: Never Smoker    Smokeless tobacco: Never Used   Substance Use  Topics    Alcohol use: No    Drug use: No     Review of Systems   Constitutional: Negative for chills and fever.   HENT: Negative for ear pain and sore throat.    Eyes: Negative for visual disturbance.   Respiratory: Negative for cough and shortness of breath.    Cardiovascular: Positive for leg swelling. Negative for chest pain.   Gastrointestinal: Negative for abdominal pain, blood in stool, diarrhea, nausea and vomiting.        No melena.   Genitourinary: Negative for dysuria.   Musculoskeletal: Negative for back pain.   Skin: Negative for rash.   Neurological: Negative for headaches.       Physical Exam     Initial Vitals [09/22/20 1634]   BP Pulse Resp Temp SpO2   (!) 179/99 104 18 98.5 °F (36.9 °C) 97 %      MAP       --         Physical Exam  The patient was examined specifically for the following:   General:No significant distress, Good color, Warm and dry. Head and neck:Scalp atraumatic, Neck supple. Neurological:Appropriate conversation, Gross motor deficits. Eyes:Conjugate gaze, Clear corneas. ENT: No epistaxis. Cardiac: Regular rate and rhythm, Grossly normal heart tones. Pulmonary: Wheezing, Rales. Gastrointestinal: Abdominal tenderness, Abdominal distention. Musculoskeletal: Extremity deformity, Apparent pain with range of motion of the joints. Skin: Rash.   The findings on examination were normal except for the following:  The patient has pale almost orange colored skin on the palms of his hands.  Rectal examination is remarkable for brown guaiac-negative stool the lungs are clear.  The heart tones are normal.  The abdomen is nontender.  ED Course   Procedures  Labs Reviewed   CBC W/ AUTO DIFFERENTIAL - Abnormal; Notable for the following components:       Result Value    RBC 2.86 (*)     Hemoglobin 9.0 (*)     Hematocrit 26.8 (*)     Mean Corpuscular Hemoglobin 31.5 (*)     Immature Granulocytes 1.4 (*)     Immature Grans (Abs) 0.08 (*)     All other components within normal limits   COMPREHENSIVE  METABOLIC PANEL - Abnormal; Notable for the following components:    Glucose 326 (*)     Creatinine 1.8 (*)     eGFR if  45 (*)     eGFR if non  39 (*)     All other components within normal limits   URINALYSIS - Abnormal; Notable for the following components:    Protein, UA 1+ (*)     Glucose, UA 3+ (*)     All other components within normal limits   POCT GLUCOSE - Abnormal; Notable for the following components:    POCT Glucose 285 (*)     All other components within normal limits   PROTIME-INR   APTT   URINALYSIS MICROSCOPIC   SARS-COV-2 RDRP GENE   TYPE & SCREEN          Imaging Results    None          Medical Decision Making:   Clinical Tests:   Lab Tests: Ordered and Reviewed  Radiological Study: Ordered and Reviewed    This patient was sent to the emergency room for evaluation of anemia.  It was thought that the hemoglobin was 7.5.  Hemoglobin today is 9.  The patient's renal function is improving.  Patient's glucose was high at 326.  We treated with IV insulin.  Repeat glucose was 286.  I will refer to primary care to the oral medicines adjusted.  Rectal examination was negative I do not think this is GI bleeding.  I will treat the patient with iron and multivitamins.          Scribe Attestation:   Scribe #1: I performed the above scribed service and the documentation accurately describes the services I performed. I attest to the accuracy of the note.                      Clinical Impression:     ICD-10-CM ICD-9-CM   1. Hyperglycemia  R73.9 790.29   2. Anemia, unspecified type  D64.9 285.9                     I personally performed the services described in this documentation.  All medical record  entries made by the scribe are at my direction and in my presence.  Signed, Dr. Godwin      ED Disposition Condition    Discharge Stable        ED Prescriptions     Medication Sig Dispense Start Date End Date Auth. Provider    ferrous gluconate (FERGON) 324 MG tablet Take 1 tablet  (324 mg total) by mouth 2 (two) times daily before meals. 120 tablet 9/22/2020  Clark Godwin MD    multivit-min-FA-lycopen-lutein (CENTRUM SILVER MEN) 300-600-300 mcg Tab Take 1 tablet by mouth once daily. 90 tablet 9/22/2020  Clark Godwin MD        Follow-up Information     Follow up With Specialties Details Why Contact Info    Rohan White MD Internal Medicine, Pediatrics In 3 days  [address] [place] [address] [place] [phone]                                         Clark Godwin MD  09/22/20 0154

## 2020-09-23 NOTE — DISCHARGE INSTRUCTIONS
Please follow your diabetic diet.  Meat and vegetables only with a small amount of fruit and grain.  Return immediately if you get worse or if new problems develop.  Please avoid candy sweets breads cake baked goods rice potatoes.  Please follow-up with her primary care doctor this week.  Iron and multivitamin as directed.

## 2021-05-01 ENCOUNTER — HOSPITAL ENCOUNTER (INPATIENT)
Facility: HOSPITAL | Age: 66
LOS: 3 days | Discharge: HOME | DRG: 683 | End: 2021-05-04
Attending: HOSPITALIST | Admitting: HOSPITALIST
Payer: COMMERCIAL

## 2021-05-01 DIAGNOSIS — E87.5 HYPERKALEMIA: ICD-10-CM

## 2021-05-01 DIAGNOSIS — R42 DIZZINESS: ICD-10-CM

## 2021-05-01 DIAGNOSIS — R07.9 CHEST PAIN: ICD-10-CM

## 2021-05-01 DIAGNOSIS — N17.9 ACUTE RENAL FAILURE, UNSPECIFIED ACUTE RENAL FAILURE TYPE: Primary | ICD-10-CM

## 2021-05-01 DIAGNOSIS — I63.9 CVA (CEREBRAL VASCULAR ACCIDENT): ICD-10-CM

## 2021-05-01 PROBLEM — N18.32 ANEMIA IN STAGE 3B CHRONIC KIDNEY DISEASE: Status: ACTIVE | Noted: 2020-08-19

## 2021-05-01 PROBLEM — N18.32 STAGE 3B CHRONIC KIDNEY DISEASE: Status: ACTIVE | Noted: 2017-06-21

## 2021-05-01 PROBLEM — N13.9 ACUTE URINARY OBSTRUCTION: Status: ACTIVE | Noted: 2021-05-01

## 2021-05-01 PROBLEM — I50.32 CHRONIC DIASTOLIC CONGESTIVE HEART FAILURE: Status: ACTIVE | Noted: 2020-08-23

## 2021-05-01 PROBLEM — R33.9 URINARY RETENTION: Status: ACTIVE | Noted: 2021-05-01

## 2021-05-01 PROBLEM — N18.9 CHRONIC KIDNEY DISEASE: Status: ACTIVE | Noted: 2017-06-21

## 2021-05-01 PROBLEM — D63.1 ANEMIA IN STAGE 3B CHRONIC KIDNEY DISEASE: Status: ACTIVE | Noted: 2020-08-19

## 2021-05-01 PROBLEM — Z90.79 HISTORY OF ROBOT-ASSISTED LAPAROSCOPIC RADICAL PROSTATECTOMY: Status: ACTIVE | Noted: 2021-05-01

## 2021-05-01 PROBLEM — R33.9 URINARY RETENTION: Status: RESOLVED | Noted: 2021-05-01 | Resolved: 2021-05-01

## 2021-05-01 LAB
ALBUMIN SERPL BCP-MCNC: 4.1 G/DL (ref 3.5–5.2)
ALBUMIN SERPL BCP-MCNC: 4.5 G/DL (ref 3.5–5.2)
ALLENS TEST: ABNORMAL
ALP SERPL-CCNC: 105 U/L (ref 55–135)
ALP SERPL-CCNC: 120 U/L (ref 55–135)
ALT SERPL W/O P-5'-P-CCNC: 19 U/L (ref 10–44)
ALT SERPL W/O P-5'-P-CCNC: 22 U/L (ref 10–44)
ANION GAP SERPL CALC-SCNC: 10 MMOL/L (ref 8–16)
ANION GAP SERPL CALC-SCNC: 11 MMOL/L (ref 8–16)
ANION GAP SERPL CALC-SCNC: 12 MMOL/L (ref 8–16)
AST SERPL-CCNC: 14 U/L (ref 10–40)
AST SERPL-CCNC: 15 U/L (ref 10–40)
B-OH-BUTYR BLD STRIP-SCNC: 0.1 MMOL/L (ref 0–0.5)
BACTERIA #/AREA URNS HPF: NORMAL /HPF
BASOPHILS # BLD AUTO: 0.03 K/UL (ref 0–0.2)
BASOPHILS NFR BLD: 0.4 % (ref 0–1.9)
BILIRUB SERPL-MCNC: 0.5 MG/DL (ref 0.1–1)
BILIRUB SERPL-MCNC: 0.5 MG/DL (ref 0.1–1)
BILIRUB UR QL STRIP: NEGATIVE
BNP SERPL-MCNC: 29 PG/ML (ref 0–99)
BUN SERPL-MCNC: 38 MG/DL (ref 6–30)
BUN SERPL-MCNC: 40 MG/DL (ref 8–23)
BUN SERPL-MCNC: 42 MG/DL (ref 8–23)
CALCIUM SERPL-MCNC: 10 MG/DL (ref 8.7–10.5)
CALCIUM SERPL-MCNC: 10 MG/DL (ref 8.7–10.5)
CHLORIDE SERPL-SCNC: 108 MMOL/L (ref 95–110)
CHLORIDE SERPL-SCNC: 109 MMOL/L (ref 95–110)
CHLORIDE SERPL-SCNC: 110 MMOL/L (ref 95–110)
CLARITY UR: CLEAR
CO2 SERPL-SCNC: 17 MMOL/L (ref 23–29)
CO2 SERPL-SCNC: 20 MMOL/L (ref 23–29)
COLOR UR: YELLOW
CREAT SERPL-MCNC: 3.4 MG/DL (ref 0.5–1.4)
CREAT SERPL-MCNC: 3.5 MG/DL (ref 0.5–1.4)
CREAT SERPL-MCNC: 3.5 MG/DL (ref 0.5–1.4)
CREAT UR-MCNC: 62.6 MG/DL (ref 23–375)
CTP QC/QA: YES
DIFFERENTIAL METHOD: ABNORMAL
EOSINOPHIL # BLD AUTO: 0 K/UL (ref 0–0.5)
EOSINOPHIL NFR BLD: 0.4 % (ref 0–8)
ERYTHROCYTE [DISTWIDTH] IN BLOOD BY AUTOMATED COUNT: 11.6 % (ref 11.5–14.5)
EST. GFR  (AFRICAN AMERICAN): 20 ML/MIN/1.73 M^2
EST. GFR  (AFRICAN AMERICAN): 21 ML/MIN/1.73 M^2
EST. GFR  (NON AFRICAN AMERICAN): 17 ML/MIN/1.73 M^2
EST. GFR  (NON AFRICAN AMERICAN): 18 ML/MIN/1.73 M^2
FERRITIN SERPL-MCNC: 471 NG/ML (ref 20–300)
GLUCOSE SERPL-MCNC: 219 MG/DL (ref 70–110)
GLUCOSE SERPL-MCNC: 290 MG/DL (ref 70–110)
GLUCOSE SERPL-MCNC: 299 MG/DL (ref 70–110)
GLUCOSE UR QL STRIP: ABNORMAL
HCO3 UR-SCNC: 19.6 MMOL/L (ref 24–28)
HCT VFR BLD AUTO: 29.8 % (ref 40–54)
HCT VFR BLD CALC: 25 %PCV (ref 36–54)
HGB BLD-MCNC: 10.1 G/DL (ref 14–18)
HGB UR QL STRIP: ABNORMAL
HYALINE CASTS #/AREA URNS LPF: 0 /LPF
IMM GRANULOCYTES # BLD AUTO: 0.09 K/UL (ref 0–0.04)
IMM GRANULOCYTES NFR BLD AUTO: 1.2 % (ref 0–0.5)
IRON SERPL-MCNC: 56 UG/DL (ref 45–160)
KETONES UR QL STRIP: NEGATIVE
LEUKOCYTE ESTERASE UR QL STRIP: NEGATIVE
LYMPHOCYTES # BLD AUTO: 1.6 K/UL (ref 1–4.8)
LYMPHOCYTES NFR BLD: 21.7 % (ref 18–48)
MCH RBC QN AUTO: 32.2 PG (ref 27–31)
MCHC RBC AUTO-ENTMCNC: 33.9 G/DL (ref 32–36)
MCV RBC AUTO: 95 FL (ref 82–98)
MICROSCOPIC COMMENT: NORMAL
MONOCYTES # BLD AUTO: 0.6 K/UL (ref 0.3–1)
MONOCYTES NFR BLD: 8 % (ref 4–15)
NEUTROPHILS # BLD AUTO: 5.1 K/UL (ref 1.8–7.7)
NEUTROPHILS NFR BLD: 68.3 % (ref 38–73)
NITRITE UR QL STRIP: NEGATIVE
NRBC BLD-RTO: 0 /100 WBC
PCO2 BLDA: 42.3 MMHG (ref 35–45)
PH SMN: 7.28 [PH] (ref 7.35–7.45)
PH UR STRIP: 7 [PH] (ref 5–8)
PLATELET # BLD AUTO: 201 K/UL (ref 150–450)
PMV BLD AUTO: 13 FL (ref 9.2–12.9)
PO2 BLDA: 19 MMHG (ref 40–60)
POC BE: -7 MMOL/L
POC IONIZED CALCIUM: 1.36 MMOL/L (ref 1.06–1.42)
POC SATURATED O2: 24 % (ref 95–100)
POC TCO2 (MEASURED): 22 MMOL/L (ref 23–29)
POC TCO2: 21 MMOL/L (ref 24–29)
POTASSIUM BLD-SCNC: 8.5 MMOL/L (ref 3.5–5.1)
POTASSIUM SERPL-SCNC: 7.9 MMOL/L (ref 3.5–5.1)
POTASSIUM SERPL-SCNC: 7.9 MMOL/L (ref 3.5–5.1)
PROT SERPL-MCNC: 7.5 G/DL (ref 6–8.4)
PROT SERPL-MCNC: 8.4 G/DL (ref 6–8.4)
PROT UR QL STRIP: ABNORMAL
PROT UR-MCNC: 108 MG/DL
PROT/CREAT UR: 1.73 MG/G{CREAT} (ref 0–0.2)
RBC # BLD AUTO: 3.14 M/UL (ref 4.6–6.2)
RBC #/AREA URNS HPF: 2 /HPF (ref 0–4)
SAMPLE: ABNORMAL
SAMPLE: ABNORMAL
SARS-COV-2 RDRP RESP QL NAA+PROBE: NEGATIVE
SATURATED IRON: 18 % (ref 20–50)
SITE: ABNORMAL
SODIUM BLD-SCNC: 133 MMOL/L (ref 136–145)
SODIUM SERPL-SCNC: 137 MMOL/L (ref 136–145)
SODIUM SERPL-SCNC: 140 MMOL/L (ref 136–145)
SP GR UR STRIP: 1.01 (ref 1–1.03)
TOTAL IRON BINDING CAPACITY: 312 UG/DL (ref 250–450)
TRANSFERRIN SERPL-MCNC: 211 MG/DL (ref 200–375)
TROPONIN I SERPL DL<=0.01 NG/ML-MCNC: 0.03 NG/ML (ref 0–0.03)
TROPONIN I SERPL DL<=0.01 NG/ML-MCNC: 0.05 NG/ML (ref 0–0.03)
URN SPEC COLLECT METH UR: ABNORMAL
UROBILINOGEN UR STRIP-ACNC: NEGATIVE EU/DL
WBC # BLD AUTO: 7.5 K/UL (ref 3.9–12.7)
WBC #/AREA URNS HPF: 2 /HPF (ref 0–5)
YEAST URNS QL MICRO: NORMAL

## 2021-05-01 PROCEDURE — 99291 CRITICAL CARE FIRST HOUR: CPT | Mod: 25

## 2021-05-01 PROCEDURE — 63600175 PHARM REV CODE 636 W HCPCS: Performed by: INTERNAL MEDICINE

## 2021-05-01 PROCEDURE — 93010 EKG 12-LEAD: ICD-10-PCS | Mod: ,,, | Performed by: INTERNAL MEDICINE

## 2021-05-01 PROCEDURE — 99223 PR INITIAL HOSPITAL CARE,LEVL III: ICD-10-PCS | Mod: 25,,, | Performed by: UROLOGY

## 2021-05-01 PROCEDURE — 82746 ASSAY OF FOLIC ACID SERUM: CPT | Performed by: INTERNAL MEDICINE

## 2021-05-01 PROCEDURE — 80053 COMPREHEN METABOLIC PANEL: CPT | Performed by: PHYSICIAN ASSISTANT

## 2021-05-01 PROCEDURE — 84132 ASSAY OF SERUM POTASSIUM: CPT

## 2021-05-01 PROCEDURE — 83036 HEMOGLOBIN GLYCOSYLATED A1C: CPT | Performed by: INTERNAL MEDICINE

## 2021-05-01 PROCEDURE — 83880 ASSAY OF NATRIURETIC PEPTIDE: CPT | Performed by: PHYSICIAN ASSISTANT

## 2021-05-01 PROCEDURE — 36415 COLL VENOUS BLD VENIPUNCTURE: CPT | Performed by: INTERNAL MEDICINE

## 2021-05-01 PROCEDURE — 93005 ELECTROCARDIOGRAM TRACING: CPT

## 2021-05-01 PROCEDURE — 25000242 PHARM REV CODE 250 ALT 637 W/ HCPCS: Performed by: PHYSICIAN ASSISTANT

## 2021-05-01 PROCEDURE — 82607 VITAMIN B-12: CPT | Performed by: INTERNAL MEDICINE

## 2021-05-01 PROCEDURE — 53620 DILATE URETHRA STRICTURE: CPT | Mod: ,,, | Performed by: UROLOGY

## 2021-05-01 PROCEDURE — 63600175 PHARM REV CODE 636 W HCPCS: Performed by: PHYSICIAN ASSISTANT

## 2021-05-01 PROCEDURE — 84156 ASSAY OF PROTEIN URINE: CPT | Performed by: INTERNAL MEDICINE

## 2021-05-01 PROCEDURE — 25000003 PHARM REV CODE 250: Performed by: HOSPITALIST

## 2021-05-01 PROCEDURE — 94640 AIRWAY INHALATION TREATMENT: CPT

## 2021-05-01 PROCEDURE — 25000003 PHARM REV CODE 250: Performed by: UROLOGY

## 2021-05-01 PROCEDURE — 84484 ASSAY OF TROPONIN QUANT: CPT | Mod: 91 | Performed by: INTERNAL MEDICINE

## 2021-05-01 PROCEDURE — 94761 N-INVAS EAR/PLS OXIMETRY MLT: CPT

## 2021-05-01 PROCEDURE — 83540 ASSAY OF IRON: CPT | Performed by: INTERNAL MEDICINE

## 2021-05-01 PROCEDURE — 99900035 HC TECH TIME PER 15 MIN (STAT)

## 2021-05-01 PROCEDURE — 96375 TX/PRO/DX INJ NEW DRUG ADDON: CPT

## 2021-05-01 PROCEDURE — 99223 1ST HOSP IP/OBS HIGH 75: CPT | Mod: 25,,, | Performed by: UROLOGY

## 2021-05-01 PROCEDURE — 93010 ELECTROCARDIOGRAM REPORT: CPT | Mod: ,,, | Performed by: INTERNAL MEDICINE

## 2021-05-01 PROCEDURE — 82565 ASSAY OF CREATININE: CPT

## 2021-05-01 PROCEDURE — 82728 ASSAY OF FERRITIN: CPT | Performed by: INTERNAL MEDICINE

## 2021-05-01 PROCEDURE — 25000003 PHARM REV CODE 250: Performed by: INTERNAL MEDICINE

## 2021-05-01 PROCEDURE — 82330 ASSAY OF CALCIUM: CPT

## 2021-05-01 PROCEDURE — 80053 COMPREHEN METABOLIC PANEL: CPT | Mod: 91 | Performed by: INTERNAL MEDICINE

## 2021-05-01 PROCEDURE — 81000 URINALYSIS NONAUTO W/SCOPE: CPT | Performed by: PHYSICIAN ASSISTANT

## 2021-05-01 PROCEDURE — 96374 THER/PROPH/DIAG INJ IV PUSH: CPT

## 2021-05-01 PROCEDURE — 82803 BLOOD GASES ANY COMBINATION: CPT

## 2021-05-01 PROCEDURE — 84295 ASSAY OF SERUM SODIUM: CPT

## 2021-05-01 PROCEDURE — 85014 HEMATOCRIT: CPT

## 2021-05-01 PROCEDURE — 85025 COMPLETE CBC W/AUTO DIFF WBC: CPT | Mod: 91 | Performed by: INTERNAL MEDICINE

## 2021-05-01 PROCEDURE — U0002 COVID-19 LAB TEST NON-CDC: HCPCS | Performed by: PHYSICIAN ASSISTANT

## 2021-05-01 PROCEDURE — 80048 BASIC METABOLIC PNL TOTAL CA: CPT | Performed by: HOSPITALIST

## 2021-05-01 PROCEDURE — 82010 KETONE BODYS QUAN: CPT | Performed by: PHYSICIAN ASSISTANT

## 2021-05-01 PROCEDURE — 25000003 PHARM REV CODE 250: Performed by: PHYSICIAN ASSISTANT

## 2021-05-01 PROCEDURE — 84484 ASSAY OF TROPONIN QUANT: CPT | Performed by: PHYSICIAN ASSISTANT

## 2021-05-01 PROCEDURE — 53620 PR DIL URET STRIC,MALE,INIT,FILIFORM: ICD-10-PCS | Mod: ,,, | Performed by: UROLOGY

## 2021-05-01 PROCEDURE — 20000000 HC ICU ROOM

## 2021-05-01 PROCEDURE — 87086 URINE CULTURE/COLONY COUNT: CPT | Performed by: UROLOGY

## 2021-05-01 PROCEDURE — 85025 COMPLETE CBC W/AUTO DIFF WBC: CPT | Performed by: PHYSICIAN ASSISTANT

## 2021-05-01 RX ORDER — GLUCAGON 1 MG
1 KIT INJECTION
Status: DISCONTINUED | OUTPATIENT
Start: 2021-05-01 | End: 2021-05-01

## 2021-05-01 RX ORDER — ALBUTEROL SULFATE 2.5 MG/.5ML
10 SOLUTION RESPIRATORY (INHALATION)
Status: COMPLETED | OUTPATIENT
Start: 2021-05-01 | End: 2021-05-01

## 2021-05-01 RX ORDER — ALBUTEROL SULFATE 90 UG/1
2 AEROSOL, METERED RESPIRATORY (INHALATION) EVERY 6 HOURS PRN
Status: DISCONTINUED | OUTPATIENT
Start: 2021-05-01 | End: 2021-05-01

## 2021-05-01 RX ORDER — ALBUTEROL SULFATE 2.5 MG/.5ML
2.5 SOLUTION RESPIRATORY (INHALATION) EVERY 4 HOURS PRN
Status: DISCONTINUED | OUTPATIENT
Start: 2021-05-01 | End: 2021-05-04 | Stop reason: HOSPADM

## 2021-05-01 RX ORDER — ACETAMINOPHEN 500 MG
500 TABLET ORAL EVERY 6 HOURS PRN
Status: DISCONTINUED | OUTPATIENT
Start: 2021-05-01 | End: 2021-05-02

## 2021-05-01 RX ORDER — ONDANSETRON 8 MG/1
8 TABLET, ORALLY DISINTEGRATING ORAL EVERY 8 HOURS PRN
Status: DISCONTINUED | OUTPATIENT
Start: 2021-05-01 | End: 2021-05-04 | Stop reason: HOSPADM

## 2021-05-01 RX ORDER — LIDOCAINE HYDROCHLORIDE 20 MG/ML
JELLY TOPICAL
Status: COMPLETED | OUTPATIENT
Start: 2021-05-01 | End: 2021-05-01

## 2021-05-01 RX ORDER — HYDRALAZINE HYDROCHLORIDE 25 MG/1
50 TABLET, FILM COATED ORAL EVERY 8 HOURS
Status: DISCONTINUED | OUTPATIENT
Start: 2021-05-01 | End: 2021-05-02

## 2021-05-01 RX ORDER — TAMSULOSIN HYDROCHLORIDE 0.4 MG/1
0.4 CAPSULE ORAL DAILY
Status: DISCONTINUED | OUTPATIENT
Start: 2021-05-02 | End: 2021-05-04 | Stop reason: HOSPADM

## 2021-05-01 RX ORDER — DOXAZOSIN 1 MG/1
2 TABLET ORAL NIGHTLY
Status: DISCONTINUED | OUTPATIENT
Start: 2021-05-01 | End: 2021-05-01

## 2021-05-01 RX ORDER — LABETALOL HYDROCHLORIDE 5 MG/ML
10 INJECTION, SOLUTION INTRAVENOUS
Status: COMPLETED | OUTPATIENT
Start: 2021-05-01 | End: 2021-05-01

## 2021-05-01 RX ORDER — INSULIN ASPART 100 [IU]/ML
0-5 INJECTION, SOLUTION INTRAVENOUS; SUBCUTANEOUS
Status: DISCONTINUED | OUTPATIENT
Start: 2021-05-01 | End: 2021-05-01

## 2021-05-01 RX ORDER — CARVEDILOL 6.25 MG/1
25 TABLET ORAL 2 TIMES DAILY
Status: DISCONTINUED | OUTPATIENT
Start: 2021-05-01 | End: 2021-05-02

## 2021-05-01 RX ORDER — INSULIN ASPART 100 [IU]/ML
0-5 INJECTION, SOLUTION INTRAVENOUS; SUBCUTANEOUS EVERY 4 HOURS PRN
Status: DISCONTINUED | OUTPATIENT
Start: 2021-05-01 | End: 2021-05-02

## 2021-05-01 RX ORDER — FERROUS GLUCONATE 324(37.5)
324 TABLET ORAL
Status: DISCONTINUED | OUTPATIENT
Start: 2021-05-01 | End: 2021-05-04 | Stop reason: HOSPADM

## 2021-05-01 RX ORDER — IBUPROFEN 200 MG
24 TABLET ORAL
Status: DISCONTINUED | OUTPATIENT
Start: 2021-05-01 | End: 2021-05-01

## 2021-05-01 RX ORDER — AMLODIPINE BESYLATE 5 MG/1
10 TABLET ORAL DAILY
Status: DISCONTINUED | OUTPATIENT
Start: 2021-05-01 | End: 2021-05-02

## 2021-05-01 RX ORDER — OXYCODONE HYDROCHLORIDE 5 MG/1
5 TABLET ORAL EVERY 6 HOURS PRN
Status: DISCONTINUED | OUTPATIENT
Start: 2021-05-01 | End: 2021-05-02

## 2021-05-01 RX ORDER — INDOMETHACIN 25 MG/1
50 CAPSULE ORAL
Status: COMPLETED | OUTPATIENT
Start: 2021-05-01 | End: 2021-05-01

## 2021-05-01 RX ORDER — FUROSEMIDE 10 MG/ML
40 INJECTION INTRAMUSCULAR; INTRAVENOUS ONCE
Status: COMPLETED | OUTPATIENT
Start: 2021-05-01 | End: 2021-05-01

## 2021-05-01 RX ORDER — PROMETHAZINE HYDROCHLORIDE 25 MG/1
25 TABLET ORAL EVERY 6 HOURS PRN
Status: DISCONTINUED | OUTPATIENT
Start: 2021-05-01 | End: 2021-05-04 | Stop reason: HOSPADM

## 2021-05-01 RX ORDER — ACETAMINOPHEN 325 MG/1
650 TABLET ORAL EVERY 4 HOURS PRN
Status: DISCONTINUED | OUTPATIENT
Start: 2021-05-01 | End: 2021-05-01

## 2021-05-01 RX ORDER — SODIUM CHLORIDE 0.9 % (FLUSH) 0.9 %
10 SYRINGE (ML) INJECTION
Status: DISCONTINUED | OUTPATIENT
Start: 2021-05-01 | End: 2021-05-04 | Stop reason: HOSPADM

## 2021-05-01 RX ORDER — NAPROXEN SODIUM 220 MG/1
81 TABLET, FILM COATED ORAL DAILY
Status: DISCONTINUED | OUTPATIENT
Start: 2021-05-02 | End: 2021-05-04 | Stop reason: HOSPADM

## 2021-05-01 RX ORDER — MORPHINE SULFATE 4 MG/ML
4 INJECTION, SOLUTION INTRAMUSCULAR; INTRAVENOUS
Status: COMPLETED | OUTPATIENT
Start: 2021-05-01 | End: 2021-05-01

## 2021-05-01 RX ORDER — HYDRALAZINE HYDROCHLORIDE 25 MG/1
25 TABLET, FILM COATED ORAL EVERY 8 HOURS
Status: DISCONTINUED | OUTPATIENT
Start: 2021-05-01 | End: 2021-05-01

## 2021-05-01 RX ORDER — PRAVASTATIN SODIUM 40 MG/1
40 TABLET ORAL DAILY
Status: DISCONTINUED | OUTPATIENT
Start: 2021-05-02 | End: 2021-05-01

## 2021-05-01 RX ORDER — HEPARIN SODIUM 5000 [USP'U]/ML
5000 INJECTION, SOLUTION INTRAVENOUS; SUBCUTANEOUS EVERY 8 HOURS
Status: DISCONTINUED | OUTPATIENT
Start: 2021-05-01 | End: 2021-05-04 | Stop reason: HOSPADM

## 2021-05-01 RX ORDER — AMOXICILLIN 250 MG
1 CAPSULE ORAL 2 TIMES DAILY PRN
Status: DISCONTINUED | OUTPATIENT
Start: 2021-05-01 | End: 2021-05-04 | Stop reason: HOSPADM

## 2021-05-01 RX ORDER — TALC
6 POWDER (GRAM) TOPICAL NIGHTLY PRN
Status: DISCONTINUED | OUTPATIENT
Start: 2021-05-01 | End: 2021-05-04 | Stop reason: HOSPADM

## 2021-05-01 RX ORDER — SODIUM CHLORIDE 0.9 % (FLUSH) 0.9 %
10 SYRINGE (ML) INJECTION
Status: DISCONTINUED | OUTPATIENT
Start: 2021-05-01 | End: 2021-05-01

## 2021-05-01 RX ORDER — GLUCAGON 1 MG
1 KIT INJECTION
Status: DISCONTINUED | OUTPATIENT
Start: 2021-05-01 | End: 2021-05-04 | Stop reason: HOSPADM

## 2021-05-01 RX ORDER — LIDOCAINE HYDROCHLORIDE 20 MG/ML
JELLY TOPICAL ONCE
Status: COMPLETED | OUTPATIENT
Start: 2021-05-01 | End: 2021-05-01

## 2021-05-01 RX ORDER — IBUPROFEN 200 MG
16 TABLET ORAL
Status: DISCONTINUED | OUTPATIENT
Start: 2021-05-01 | End: 2021-05-01

## 2021-05-01 RX ORDER — AMLODIPINE BESYLATE 5 MG/1
10 TABLET ORAL DAILY
Status: DISCONTINUED | OUTPATIENT
Start: 2021-05-02 | End: 2021-05-01

## 2021-05-01 RX ORDER — MUPIROCIN 20 MG/G
OINTMENT TOPICAL 2 TIMES DAILY
Status: DISCONTINUED | OUTPATIENT
Start: 2021-05-01 | End: 2021-05-04 | Stop reason: HOSPADM

## 2021-05-01 RX ADMIN — FUROSEMIDE 40 MG: 10 INJECTION, SOLUTION INTRAVENOUS at 09:05

## 2021-05-01 RX ADMIN — SODIUM ZIRCONIUM CYCLOSILICATE 10 G: 10 POWDER, FOR SUSPENSION ORAL at 04:05

## 2021-05-01 RX ADMIN — LABETALOL HYDROCHLORIDE 10 MG: 5 INJECTION INTRAVENOUS at 03:05

## 2021-05-01 RX ADMIN — AMLODIPINE BESYLATE 10 MG: 5 TABLET ORAL at 08:05

## 2021-05-01 RX ADMIN — LIDOCAINE HYDROCHLORIDE 10 ML: 20 JELLY TOPICAL at 05:05

## 2021-05-01 RX ADMIN — CARVEDILOL 25 MG: 12.5 TABLET, FILM COATED ORAL at 08:05

## 2021-05-01 RX ADMIN — HYDRALAZINE HYDROCHLORIDE 50 MG: 25 TABLET, FILM COATED ORAL at 09:05

## 2021-05-01 RX ADMIN — CALCIUM GLUCONATE 1 G: 98 INJECTION, SOLUTION INTRAVENOUS at 04:05

## 2021-05-01 RX ADMIN — SODIUM BICARBONATE 50 MEQ: 84 INJECTION, SOLUTION INTRAVENOUS at 04:05

## 2021-05-01 RX ADMIN — SODIUM CHLORIDE 1000 ML: 0.9 INJECTION, SOLUTION INTRAVENOUS at 08:05

## 2021-05-01 RX ADMIN — LIDOCAINE HYDROCHLORIDE 10 ML: 20 JELLY TOPICAL at 07:05

## 2021-05-01 RX ADMIN — HEPARIN SODIUM 5000 UNITS: 5000 INJECTION INTRAVENOUS; SUBCUTANEOUS at 09:05

## 2021-05-01 RX ADMIN — MUPIROCIN: 20 OINTMENT TOPICAL at 08:05

## 2021-05-01 RX ADMIN — CALCIUM GLUCONATE 1000 MG: 98 INJECTION, SOLUTION INTRAVENOUS at 08:05

## 2021-05-01 RX ADMIN — SODIUM BICARBONATE: 84 INJECTION, SOLUTION INTRAVENOUS at 05:05

## 2021-05-01 RX ADMIN — SODIUM BICARBONATE 50 MEQ: 84 INJECTION, SOLUTION INTRAVENOUS at 06:05

## 2021-05-01 RX ADMIN — INSULIN HUMAN 10 UNITS: 100 INJECTION, SOLUTION PARENTERAL at 04:05

## 2021-05-01 RX ADMIN — ALBUTEROL SULFATE 10 MG: 2.5 SOLUTION RESPIRATORY (INHALATION) at 05:05

## 2021-05-01 RX ADMIN — SODIUM ZIRCONIUM CYCLOSILICATE 10 G: 10 POWDER, FOR SUSPENSION ORAL at 07:05

## 2021-05-01 RX ADMIN — MORPHINE SULFATE 4 MG: 4 INJECTION, SOLUTION INTRAMUSCULAR; INTRAVENOUS at 05:05

## 2021-05-02 PROBLEM — I63.9 CVA (CEREBRAL VASCULAR ACCIDENT): Status: ACTIVE | Noted: 2021-05-02

## 2021-05-02 PROBLEM — N17.9 ACUTE RENAL FAILURE: Status: ACTIVE | Noted: 2021-05-02

## 2021-05-02 LAB
ANION GAP SERPL CALC-SCNC: 12 MMOL/L (ref 8–16)
ANION GAP SERPL CALC-SCNC: 14 MMOL/L (ref 8–16)
ANION GAP SERPL CALC-SCNC: 16 MMOL/L (ref 8–16)
ANION GAP SERPL CALC-SCNC: 16 MMOL/L (ref 8–16)
BASOPHILS # BLD AUTO: 0.02 K/UL (ref 0–0.2)
BASOPHILS NFR BLD: 0.3 % (ref 0–1.9)
BUN SERPL-MCNC: 34 MG/DL (ref 8–23)
BUN SERPL-MCNC: 35 MG/DL (ref 8–23)
BUN SERPL-MCNC: 38 MG/DL (ref 8–23)
BUN SERPL-MCNC: 39 MG/DL (ref 8–23)
CALCIUM SERPL-MCNC: 9.1 MG/DL (ref 8.7–10.5)
CALCIUM SERPL-MCNC: 9.1 MG/DL (ref 8.7–10.5)
CALCIUM SERPL-MCNC: 9.4 MG/DL (ref 8.7–10.5)
CALCIUM SERPL-MCNC: 9.4 MG/DL (ref 8.7–10.5)
CHLORIDE SERPL-SCNC: 103 MMOL/L (ref 95–110)
CHLORIDE SERPL-SCNC: 104 MMOL/L (ref 95–110)
CHLORIDE SERPL-SCNC: 106 MMOL/L (ref 95–110)
CHLORIDE SERPL-SCNC: 95 MMOL/L (ref 95–110)
CO2 SERPL-SCNC: 19 MMOL/L (ref 23–29)
CO2 SERPL-SCNC: 21 MMOL/L (ref 23–29)
CO2 SERPL-SCNC: 22 MMOL/L (ref 23–29)
CO2 SERPL-SCNC: 28 MMOL/L (ref 23–29)
CREAT SERPL-MCNC: 2.8 MG/DL (ref 0.5–1.4)
CREAT SERPL-MCNC: 2.9 MG/DL (ref 0.5–1.4)
CREAT SERPL-MCNC: 3 MG/DL (ref 0.5–1.4)
CREAT SERPL-MCNC: 3 MG/DL (ref 0.5–1.4)
DIFFERENTIAL METHOD: ABNORMAL
EOSINOPHIL # BLD AUTO: 0.1 K/UL (ref 0–0.5)
EOSINOPHIL NFR BLD: 1.3 % (ref 0–8)
ERYTHROCYTE [DISTWIDTH] IN BLOOD BY AUTOMATED COUNT: 11.5 % (ref 11.5–14.5)
EST. GFR  (AFRICAN AMERICAN): 24 ML/MIN/1.73 M^2
EST. GFR  (AFRICAN AMERICAN): 24 ML/MIN/1.73 M^2
EST. GFR  (AFRICAN AMERICAN): 25 ML/MIN/1.73 M^2
EST. GFR  (AFRICAN AMERICAN): 26 ML/MIN/1.73 M^2
EST. GFR  (NON AFRICAN AMERICAN): 21 ML/MIN/1.73 M^2
EST. GFR  (NON AFRICAN AMERICAN): 21 ML/MIN/1.73 M^2
EST. GFR  (NON AFRICAN AMERICAN): 22 ML/MIN/1.73 M^2
EST. GFR  (NON AFRICAN AMERICAN): 23 ML/MIN/1.73 M^2
ESTIMATED AVG GLUCOSE: 180 MG/DL (ref 68–131)
FOLATE SERPL-MCNC: 16.1 NG/ML (ref 4–24)
GLUCOSE SERPL-MCNC: 182 MG/DL (ref 70–110)
GLUCOSE SERPL-MCNC: 195 MG/DL (ref 70–110)
GLUCOSE SERPL-MCNC: 196 MG/DL (ref 70–110)
GLUCOSE SERPL-MCNC: 283 MG/DL (ref 70–110)
HBA1C MFR BLD: 7.9 % (ref 4–5.6)
HCT VFR BLD AUTO: 23.8 % (ref 40–54)
HGB BLD-MCNC: 8.2 G/DL (ref 14–18)
IMM GRANULOCYTES # BLD AUTO: 0.03 K/UL (ref 0–0.04)
IMM GRANULOCYTES NFR BLD AUTO: 0.5 % (ref 0–0.5)
LYMPHOCYTES # BLD AUTO: 2 K/UL (ref 1–4.8)
LYMPHOCYTES NFR BLD: 32.7 % (ref 18–48)
MCH RBC QN AUTO: 32.5 PG (ref 27–31)
MCHC RBC AUTO-ENTMCNC: 34.5 G/DL (ref 32–36)
MCV RBC AUTO: 94 FL (ref 82–98)
MONOCYTES # BLD AUTO: 0.5 K/UL (ref 0.3–1)
MONOCYTES NFR BLD: 8 % (ref 4–15)
NEUTROPHILS # BLD AUTO: 3.6 K/UL (ref 1.8–7.7)
NEUTROPHILS NFR BLD: 57.2 % (ref 38–73)
NRBC BLD-RTO: 0 /100 WBC
PLATELET # BLD AUTO: 163 K/UL (ref 150–450)
PMV BLD AUTO: 13.6 FL (ref 9.2–12.9)
POCT GLUCOSE: 194 MG/DL (ref 70–110)
POCT GLUCOSE: 195 MG/DL (ref 70–110)
POCT GLUCOSE: 204 MG/DL (ref 70–110)
POCT GLUCOSE: 217 MG/DL (ref 70–110)
POCT GLUCOSE: 259 MG/DL (ref 70–110)
POCT GLUCOSE: 354 MG/DL (ref 70–110)
POCT GLUCOSE: 488 MG/DL (ref 70–110)
POTASSIUM SERPL-SCNC: 4.6 MMOL/L (ref 3.5–5.1)
POTASSIUM SERPL-SCNC: 5.2 MMOL/L (ref 3.5–5.1)
POTASSIUM SERPL-SCNC: 6 MMOL/L (ref 3.5–5.1)
POTASSIUM SERPL-SCNC: 6.5 MMOL/L (ref 3.5–5.1)
RBC # BLD AUTO: 2.52 M/UL (ref 4.6–6.2)
SODIUM SERPL-SCNC: 137 MMOL/L (ref 136–145)
SODIUM SERPL-SCNC: 139 MMOL/L (ref 136–145)
SODIUM SERPL-SCNC: 139 MMOL/L (ref 136–145)
SODIUM SERPL-SCNC: 141 MMOL/L (ref 136–145)
TROPONIN I SERPL DL<=0.01 NG/ML-MCNC: 0.04 NG/ML (ref 0–0.03)
VIT B12 SERPL-MCNC: 639 PG/ML (ref 210–950)
WBC # BLD AUTO: 6.23 K/UL (ref 3.9–12.7)

## 2021-05-02 PROCEDURE — 63600175 PHARM REV CODE 636 W HCPCS: Performed by: HOSPITALIST

## 2021-05-02 PROCEDURE — 94761 N-INVAS EAR/PLS OXIMETRY MLT: CPT

## 2021-05-02 PROCEDURE — 25000003 PHARM REV CODE 250: Performed by: INTERNAL MEDICINE

## 2021-05-02 PROCEDURE — 99900035 HC TECH TIME PER 15 MIN (STAT)

## 2021-05-02 PROCEDURE — 63600175 PHARM REV CODE 636 W HCPCS: Performed by: INTERNAL MEDICINE

## 2021-05-02 PROCEDURE — 84484 ASSAY OF TROPONIN QUANT: CPT | Performed by: INTERNAL MEDICINE

## 2021-05-02 PROCEDURE — 25000003 PHARM REV CODE 250: Performed by: HOSPITALIST

## 2021-05-02 PROCEDURE — 99232 PR SUBSEQUENT HOSPITAL CARE,LEVL II: ICD-10-PCS | Mod: ,,, | Performed by: UROLOGY

## 2021-05-02 PROCEDURE — 36415 COLL VENOUS BLD VENIPUNCTURE: CPT | Performed by: HOSPITALIST

## 2021-05-02 PROCEDURE — 99232 SBSQ HOSP IP/OBS MODERATE 35: CPT | Mod: ,,, | Performed by: UROLOGY

## 2021-05-02 PROCEDURE — G0508 CRIT CARE TELEHEA CONSULT 60: HCPCS | Mod: 95,,, | Performed by: PSYCHIATRY & NEUROLOGY

## 2021-05-02 PROCEDURE — 20000000 HC ICU ROOM

## 2021-05-02 PROCEDURE — G0508 PR CRITICAL CARE TELEHLTH INITIAL CONSULT 60MIN: ICD-10-PCS | Mod: 95,,, | Performed by: PSYCHIATRY & NEUROLOGY

## 2021-05-02 PROCEDURE — C9399 UNCLASSIFIED DRUGS OR BIOLOG: HCPCS | Performed by: HOSPITALIST

## 2021-05-02 PROCEDURE — 36415 COLL VENOUS BLD VENIPUNCTURE: CPT | Performed by: INTERNAL MEDICINE

## 2021-05-02 PROCEDURE — 80048 BASIC METABOLIC PNL TOTAL CA: CPT | Performed by: HOSPITALIST

## 2021-05-02 RX ORDER — INSULIN ASPART 100 [IU]/ML
1-10 INJECTION, SOLUTION INTRAVENOUS; SUBCUTANEOUS
Status: DISCONTINUED | OUTPATIENT
Start: 2021-05-02 | End: 2021-05-04 | Stop reason: HOSPADM

## 2021-05-02 RX ORDER — ACETAMINOPHEN 325 MG/1
650 TABLET ORAL EVERY 4 HOURS PRN
Status: DISCONTINUED | OUTPATIENT
Start: 2021-05-02 | End: 2021-05-04 | Stop reason: HOSPADM

## 2021-05-02 RX ORDER — OXYCODONE HYDROCHLORIDE 5 MG/1
5 TABLET ORAL EVERY 6 HOURS PRN
Status: DISCONTINUED | OUTPATIENT
Start: 2021-05-02 | End: 2021-05-03

## 2021-05-02 RX ORDER — FUROSEMIDE 10 MG/ML
40 INJECTION INTRAMUSCULAR; INTRAVENOUS ONCE
Status: COMPLETED | OUTPATIENT
Start: 2021-05-02 | End: 2021-05-02

## 2021-05-02 RX ORDER — SODIUM CHLORIDE 9 MG/ML
INJECTION, SOLUTION INTRAVENOUS ONCE
Status: DISCONTINUED | OUTPATIENT
Start: 2021-05-03 | End: 2021-05-03

## 2021-05-02 RX ADMIN — HYDRALAZINE HYDROCHLORIDE 50 MG: 25 TABLET, FILM COATED ORAL at 10:05

## 2021-05-02 RX ADMIN — FERROUS GLUCONATE TAB 324 MG (37.5 MG ELEMENTAL IRON) 324 MG: 324 (37.5 FE) TAB at 03:05

## 2021-05-02 RX ADMIN — HEPARIN SODIUM 5000 UNITS: 5000 INJECTION INTRAVENOUS; SUBCUTANEOUS at 10:05

## 2021-05-02 RX ADMIN — INSULIN ASPART 10 UNITS: 100 INJECTION, SOLUTION INTRAVENOUS; SUBCUTANEOUS at 04:05

## 2021-05-02 RX ADMIN — CARVEDILOL 25 MG: 12.5 TABLET, FILM COATED ORAL at 08:05

## 2021-05-02 RX ADMIN — MUPIROCIN: 20 OINTMENT TOPICAL at 08:05

## 2021-05-02 RX ADMIN — INSULIN ASPART 1 UNITS: 100 INJECTION, SOLUTION INTRAVENOUS; SUBCUTANEOUS at 12:05

## 2021-05-02 RX ADMIN — SODIUM ZIRCONIUM CYCLOSILICATE 10 G: 10 POWDER, FOR SUSPENSION ORAL at 08:05

## 2021-05-02 RX ADMIN — HYDRALAZINE HYDROCHLORIDE 50 MG: 25 TABLET, FILM COATED ORAL at 05:05

## 2021-05-02 RX ADMIN — HYDRALAZINE HYDROCHLORIDE 50 MG: 25 TABLET, FILM COATED ORAL at 02:05

## 2021-05-02 RX ADMIN — SODIUM ZIRCONIUM CYCLOSILICATE 10 G: 10 POWDER, FOR SUSPENSION ORAL at 05:05

## 2021-05-02 RX ADMIN — INSULIN ASPART 3 UNITS: 100 INJECTION, SOLUTION INTRAVENOUS; SUBCUTANEOUS at 08:05

## 2021-05-02 RX ADMIN — FUROSEMIDE 40 MG: 10 INJECTION, SOLUTION INTRAVENOUS at 08:05

## 2021-05-02 RX ADMIN — HEPARIN SODIUM 5000 UNITS: 5000 INJECTION INTRAVENOUS; SUBCUTANEOUS at 05:05

## 2021-05-02 RX ADMIN — TAMSULOSIN HYDROCHLORIDE 0.4 MG: 0.4 CAPSULE ORAL at 08:05

## 2021-05-02 RX ADMIN — ASPIRIN 81 MG: 81 TABLET, CHEWABLE ORAL at 08:05

## 2021-05-02 RX ADMIN — SODIUM CHLORIDE 1000 ML: 0.9 INJECTION, SOLUTION INTRAVENOUS at 05:05

## 2021-05-02 RX ADMIN — INSULIN DETEMIR 5 UNITS: 100 INJECTION, SOLUTION SUBCUTANEOUS at 08:05

## 2021-05-02 RX ADMIN — SODIUM BICARBONATE: 84 INJECTION, SOLUTION INTRAVENOUS at 02:05

## 2021-05-02 RX ADMIN — HEPARIN SODIUM 5000 UNITS: 5000 INJECTION INTRAVENOUS; SUBCUTANEOUS at 02:05

## 2021-05-02 RX ADMIN — AMLODIPINE BESYLATE 10 MG: 5 TABLET ORAL at 08:05

## 2021-05-02 RX ADMIN — INSULIN ASPART 10 UNITS: 100 INJECTION, SOLUTION INTRAVENOUS; SUBCUTANEOUS at 12:05

## 2021-05-02 RX ADMIN — INSULIN ASPART 1 UNITS: 100 INJECTION, SOLUTION INTRAVENOUS; SUBCUTANEOUS at 03:05

## 2021-05-02 RX ADMIN — SODIUM BICARBONATE: 84 INJECTION, SOLUTION INTRAVENOUS at 01:05

## 2021-05-02 RX ADMIN — FERROUS GLUCONATE TAB 324 MG (37.5 MG ELEMENTAL IRON) 324 MG: 324 (37.5 FE) TAB at 05:05

## 2021-05-03 PROBLEM — N17.9 ACUTE RENAL FAILURE: Status: ACTIVE | Noted: 2021-05-03

## 2021-05-03 PROBLEM — G45.9 TIA (TRANSIENT ISCHEMIC ATTACK): Status: ACTIVE | Noted: 2021-05-03

## 2021-05-03 PROBLEM — N17.9 ACUTE RENAL FAILURE: Status: RESOLVED | Noted: 2021-05-02 | Resolved: 2021-05-03

## 2021-05-03 PROBLEM — I63.9 CVA (CEREBRAL VASCULAR ACCIDENT): Status: RESOLVED | Noted: 2021-05-02 | Resolved: 2021-05-03

## 2021-05-03 LAB
ANION GAP SERPL CALC-SCNC: 13 MMOL/L (ref 8–16)
BACTERIA UR CULT: NO GROWTH
BASOPHILS # BLD AUTO: 0.02 K/UL (ref 0–0.2)
BASOPHILS NFR BLD: 0.2 % (ref 0–1.9)
BUN SERPL-MCNC: 30 MG/DL (ref 8–23)
CALCIUM SERPL-MCNC: 8.6 MG/DL (ref 8.7–10.5)
CHLORIDE SERPL-SCNC: 100 MMOL/L (ref 95–110)
CO2 SERPL-SCNC: 29 MMOL/L (ref 23–29)
CREAT SERPL-MCNC: 2.8 MG/DL (ref 0.5–1.4)
DIFFERENTIAL METHOD: ABNORMAL
EOSINOPHIL # BLD AUTO: 0.1 K/UL (ref 0–0.5)
EOSINOPHIL NFR BLD: 1.4 % (ref 0–8)
ERYTHROCYTE [DISTWIDTH] IN BLOOD BY AUTOMATED COUNT: 11.1 % (ref 11.5–14.5)
EST. GFR  (AFRICAN AMERICAN): 26 ML/MIN/1.73 M^2
EST. GFR  (NON AFRICAN AMERICAN): 23 ML/MIN/1.73 M^2
GLUCOSE SERPL-MCNC: 131 MG/DL (ref 70–110)
HCT VFR BLD AUTO: 26.6 % (ref 40–54)
HGB BLD-MCNC: 9.1 G/DL (ref 14–18)
IMM GRANULOCYTES # BLD AUTO: 0.04 K/UL (ref 0–0.04)
IMM GRANULOCYTES NFR BLD AUTO: 0.5 % (ref 0–0.5)
LYMPHOCYTES # BLD AUTO: 2.3 K/UL (ref 1–4.8)
LYMPHOCYTES NFR BLD: 28.8 % (ref 18–48)
MCH RBC QN AUTO: 31.5 PG (ref 27–31)
MCHC RBC AUTO-ENTMCNC: 34.2 G/DL (ref 32–36)
MCV RBC AUTO: 92 FL (ref 82–98)
MONOCYTES # BLD AUTO: 0.6 K/UL (ref 0.3–1)
MONOCYTES NFR BLD: 6.8 % (ref 4–15)
NEUTROPHILS # BLD AUTO: 5 K/UL (ref 1.8–7.7)
NEUTROPHILS NFR BLD: 62.3 % (ref 38–73)
NRBC BLD-RTO: 0 /100 WBC
PHOSPHATE SERPL-MCNC: 5.2 MG/DL (ref 2.7–4.5)
PLATELET # BLD AUTO: 166 K/UL (ref 150–450)
PMV BLD AUTO: 13.1 FL (ref 9.2–12.9)
POCT GLUCOSE: 272 MG/DL (ref 70–110)
POCT GLUCOSE: 305 MG/DL (ref 70–110)
POCT GLUCOSE: 310 MG/DL (ref 70–110)
POTASSIUM SERPL-SCNC: 3.9 MMOL/L (ref 3.5–5.1)
RBC # BLD AUTO: 2.89 M/UL (ref 4.6–6.2)
SODIUM SERPL-SCNC: 142 MMOL/L (ref 136–145)
WBC # BLD AUTO: 8.09 K/UL (ref 3.9–12.7)

## 2021-05-03 PROCEDURE — 80048 BASIC METABOLIC PNL TOTAL CA: CPT | Performed by: HOSPITALIST

## 2021-05-03 PROCEDURE — 63600175 PHARM REV CODE 636 W HCPCS: Performed by: HOSPITALIST

## 2021-05-03 PROCEDURE — 99222 1ST HOSP IP/OBS MODERATE 55: CPT | Mod: ,,, | Performed by: PSYCHIATRY & NEUROLOGY

## 2021-05-03 PROCEDURE — 25000003 PHARM REV CODE 250: Performed by: HOSPITALIST

## 2021-05-03 PROCEDURE — 85025 COMPLETE CBC W/AUTO DIFF WBC: CPT | Performed by: HOSPITALIST

## 2021-05-03 PROCEDURE — C9399 UNCLASSIFIED DRUGS OR BIOLOG: HCPCS | Performed by: HOSPITALIST

## 2021-05-03 PROCEDURE — 21400001 HC TELEMETRY ROOM

## 2021-05-03 PROCEDURE — 36415 COLL VENOUS BLD VENIPUNCTURE: CPT | Performed by: HOSPITALIST

## 2021-05-03 PROCEDURE — 99233 SBSQ HOSP IP/OBS HIGH 50: CPT | Mod: ,,, | Performed by: STUDENT IN AN ORGANIZED HEALTH CARE EDUCATION/TRAINING PROGRAM

## 2021-05-03 PROCEDURE — 25000003 PHARM REV CODE 250: Performed by: PSYCHIATRY & NEUROLOGY

## 2021-05-03 PROCEDURE — 99222 PR INITIAL HOSPITAL CARE,LEVL II: ICD-10-PCS | Mod: ,,, | Performed by: PSYCHIATRY & NEUROLOGY

## 2021-05-03 PROCEDURE — 84100 ASSAY OF PHOSPHORUS: CPT | Performed by: HOSPITALIST

## 2021-05-03 PROCEDURE — 99233 PR SUBSEQUENT HOSPITAL CARE,LEVL III: ICD-10-PCS | Mod: ,,, | Performed by: STUDENT IN AN ORGANIZED HEALTH CARE EDUCATION/TRAINING PROGRAM

## 2021-05-03 RX ORDER — CLOPIDOGREL BISULFATE 75 MG/1
75 TABLET ORAL DAILY
Status: DISCONTINUED | OUTPATIENT
Start: 2021-05-03 | End: 2021-05-04 | Stop reason: HOSPADM

## 2021-05-03 RX ORDER — CARVEDILOL 3.12 MG/1
3.12 TABLET ORAL 2 TIMES DAILY
Status: DISCONTINUED | OUTPATIENT
Start: 2021-05-03 | End: 2021-05-04 | Stop reason: HOSPADM

## 2021-05-03 RX ADMIN — CLOPIDOGREL 75 MG: 75 TABLET, FILM COATED ORAL at 03:05

## 2021-05-03 RX ADMIN — INSULIN DETEMIR 5 UNITS: 100 INJECTION, SOLUTION SUBCUTANEOUS at 09:05

## 2021-05-03 RX ADMIN — FERROUS GLUCONATE TAB 324 MG (37.5 MG ELEMENTAL IRON) 324 MG: 324 (37.5 FE) TAB at 03:05

## 2021-05-03 RX ADMIN — INSULIN ASPART 6 UNITS: 100 INJECTION, SOLUTION INTRAVENOUS; SUBCUTANEOUS at 05:05

## 2021-05-03 RX ADMIN — CARVEDILOL 3.12 MG: 3.12 TABLET, FILM COATED ORAL at 12:05

## 2021-05-03 RX ADMIN — INSULIN ASPART 4 UNITS: 100 INJECTION, SOLUTION INTRAVENOUS; SUBCUTANEOUS at 09:05

## 2021-05-03 RX ADMIN — MUPIROCIN: 20 OINTMENT TOPICAL at 09:05

## 2021-05-03 RX ADMIN — INSULIN ASPART 8 UNITS: 100 INJECTION, SOLUTION INTRAVENOUS; SUBCUTANEOUS at 12:05

## 2021-05-03 RX ADMIN — CARVEDILOL 3.12 MG: 3.12 TABLET, FILM COATED ORAL at 09:05

## 2021-05-03 RX ADMIN — SODIUM BICARBONATE: 84 INJECTION, SOLUTION INTRAVENOUS at 01:05

## 2021-05-03 RX ADMIN — HEPARIN SODIUM 5000 UNITS: 5000 INJECTION INTRAVENOUS; SUBCUTANEOUS at 01:05

## 2021-05-03 RX ADMIN — TAMSULOSIN HYDROCHLORIDE 0.4 MG: 0.4 CAPSULE ORAL at 08:05

## 2021-05-03 RX ADMIN — HEPARIN SODIUM 5000 UNITS: 5000 INJECTION INTRAVENOUS; SUBCUTANEOUS at 06:05

## 2021-05-03 RX ADMIN — SODIUM ZIRCONIUM CYCLOSILICATE 10 G: 10 POWDER, FOR SUSPENSION ORAL at 09:05

## 2021-05-03 RX ADMIN — HEPARIN SODIUM 5000 UNITS: 5000 INJECTION INTRAVENOUS; SUBCUTANEOUS at 09:05

## 2021-05-03 RX ADMIN — ASPIRIN 81 MG: 81 TABLET, CHEWABLE ORAL at 08:05

## 2021-05-03 RX ADMIN — MUPIROCIN: 20 OINTMENT TOPICAL at 08:05

## 2021-05-04 VITALS
WEIGHT: 160.94 LBS | BODY MASS INDEX: 24.39 KG/M2 | SYSTOLIC BLOOD PRESSURE: 138 MMHG | OXYGEN SATURATION: 100 % | TEMPERATURE: 99 F | HEART RATE: 91 BPM | RESPIRATION RATE: 16 BRPM | HEIGHT: 68 IN | DIASTOLIC BLOOD PRESSURE: 73 MMHG

## 2021-05-04 LAB
ANION GAP SERPL CALC-SCNC: 14 MMOL/L (ref 8–16)
BASOPHILS # BLD AUTO: 0.02 K/UL (ref 0–0.2)
BASOPHILS NFR BLD: 0.4 % (ref 0–1.9)
BUN SERPL-MCNC: 33 MG/DL (ref 8–23)
CALCIUM SERPL-MCNC: 8.6 MG/DL (ref 8.7–10.5)
CHLORIDE SERPL-SCNC: 101 MMOL/L (ref 95–110)
CO2 SERPL-SCNC: 24 MMOL/L (ref 23–29)
CREAT SERPL-MCNC: 2.8 MG/DL (ref 0.5–1.4)
DIFFERENTIAL METHOD: ABNORMAL
EOSINOPHIL # BLD AUTO: 0.2 K/UL (ref 0–0.5)
EOSINOPHIL NFR BLD: 2.7 % (ref 0–8)
ERYTHROCYTE [DISTWIDTH] IN BLOOD BY AUTOMATED COUNT: 11.3 % (ref 11.5–14.5)
EST. GFR  (AFRICAN AMERICAN): 26 ML/MIN/1.73 M^2
EST. GFR  (NON AFRICAN AMERICAN): 23 ML/MIN/1.73 M^2
GLUCOSE SERPL-MCNC: 267 MG/DL (ref 70–110)
HCT VFR BLD AUTO: 25.7 % (ref 40–54)
HGB BLD-MCNC: 8.8 G/DL (ref 14–18)
IMM GRANULOCYTES # BLD AUTO: 0.03 K/UL (ref 0–0.04)
IMM GRANULOCYTES NFR BLD AUTO: 0.5 % (ref 0–0.5)
LYMPHOCYTES # BLD AUTO: 2.2 K/UL (ref 1–4.8)
LYMPHOCYTES NFR BLD: 39.6 % (ref 18–48)
MCH RBC QN AUTO: 31.9 PG (ref 27–31)
MCHC RBC AUTO-ENTMCNC: 34.2 G/DL (ref 32–36)
MCV RBC AUTO: 93 FL (ref 82–98)
MONOCYTES # BLD AUTO: 0.5 K/UL (ref 0.3–1)
MONOCYTES NFR BLD: 9.5 % (ref 4–15)
NEUTROPHILS # BLD AUTO: 2.6 K/UL (ref 1.8–7.7)
NEUTROPHILS NFR BLD: 47.3 % (ref 38–73)
NRBC BLD-RTO: 0 /100 WBC
PLATELET # BLD AUTO: 160 K/UL (ref 150–450)
PMV BLD AUTO: 13 FL (ref 9.2–12.9)
POCT GLUCOSE: 213 MG/DL (ref 70–110)
POCT GLUCOSE: 299 MG/DL (ref 70–110)
POCT GLUCOSE: 347 MG/DL (ref 70–110)
POTASSIUM SERPL-SCNC: 3.7 MMOL/L (ref 3.5–5.1)
RBC # BLD AUTO: 2.76 M/UL (ref 4.6–6.2)
SODIUM SERPL-SCNC: 139 MMOL/L (ref 136–145)
WBC # BLD AUTO: 5.55 K/UL (ref 3.9–12.7)

## 2021-05-04 PROCEDURE — 25000003 PHARM REV CODE 250: Performed by: HOSPITALIST

## 2021-05-04 PROCEDURE — 97116 GAIT TRAINING THERAPY: CPT

## 2021-05-04 PROCEDURE — 99232 SBSQ HOSP IP/OBS MODERATE 35: CPT | Mod: ,,, | Performed by: PSYCHIATRY & NEUROLOGY

## 2021-05-04 PROCEDURE — 63600175 PHARM REV CODE 636 W HCPCS: Performed by: HOSPITALIST

## 2021-05-04 PROCEDURE — 97162 PT EVAL MOD COMPLEX 30 MIN: CPT

## 2021-05-04 PROCEDURE — 25000003 PHARM REV CODE 250: Performed by: PSYCHIATRY & NEUROLOGY

## 2021-05-04 PROCEDURE — 99232 PR SUBSEQUENT HOSPITAL CARE,LEVL II: ICD-10-PCS | Mod: ,,, | Performed by: UROLOGY

## 2021-05-04 PROCEDURE — 36415 COLL VENOUS BLD VENIPUNCTURE: CPT | Performed by: HOSPITALIST

## 2021-05-04 PROCEDURE — 97165 OT EVAL LOW COMPLEX 30 MIN: CPT

## 2021-05-04 PROCEDURE — 99232 PR SUBSEQUENT HOSPITAL CARE,LEVL II: ICD-10-PCS | Mod: ,,, | Performed by: PSYCHIATRY & NEUROLOGY

## 2021-05-04 PROCEDURE — 80048 BASIC METABOLIC PNL TOTAL CA: CPT | Performed by: HOSPITALIST

## 2021-05-04 PROCEDURE — 99232 SBSQ HOSP IP/OBS MODERATE 35: CPT | Mod: ,,, | Performed by: UROLOGY

## 2021-05-04 PROCEDURE — 85025 COMPLETE CBC W/AUTO DIFF WBC: CPT | Performed by: HOSPITALIST

## 2021-05-04 RX ORDER — AMOXICILLIN 250 MG
1 CAPSULE ORAL 2 TIMES DAILY PRN
Qty: 60 TABLET | Refills: 0 | Status: SHIPPED | OUTPATIENT
Start: 2021-05-04

## 2021-05-04 RX ORDER — ONDANSETRON 8 MG/1
8 TABLET, ORALLY DISINTEGRATING ORAL EVERY 8 HOURS PRN
Qty: 30 TABLET | Refills: 1 | Status: SHIPPED | OUTPATIENT
Start: 2021-05-04

## 2021-05-04 RX ORDER — CARVEDILOL 3.12 MG/1
3.12 TABLET ORAL 2 TIMES DAILY
Qty: 60 TABLET | Refills: 11 | Status: ON HOLD | OUTPATIENT
Start: 2021-05-04 | End: 2021-10-16 | Stop reason: SDUPTHER

## 2021-05-04 RX ORDER — INSULIN PUMP SYRINGE, 3 ML
EACH MISCELLANEOUS
Qty: 1 EACH | Refills: 0 | Status: SHIPPED | OUTPATIENT
Start: 2021-05-04 | End: 2022-05-04

## 2021-05-04 RX ORDER — TAMSULOSIN HYDROCHLORIDE 0.4 MG/1
0.4 CAPSULE ORAL DAILY
Qty: 30 CAPSULE | Refills: 1 | Status: SHIPPED | OUTPATIENT
Start: 2021-05-05 | End: 2022-05-05

## 2021-05-04 RX ORDER — CLOPIDOGREL BISULFATE 75 MG/1
75 TABLET ORAL DAILY
Qty: 21 TABLET | Refills: 0 | Status: ON HOLD | OUTPATIENT
Start: 2021-05-05 | End: 2021-10-16 | Stop reason: HOSPADM

## 2021-05-04 RX ADMIN — TAMSULOSIN HYDROCHLORIDE 0.4 MG: 0.4 CAPSULE ORAL at 09:05

## 2021-05-04 RX ADMIN — HEPARIN SODIUM 5000 UNITS: 5000 INJECTION INTRAVENOUS; SUBCUTANEOUS at 05:05

## 2021-05-04 RX ADMIN — SODIUM ZIRCONIUM CYCLOSILICATE 10 G: 10 POWDER, FOR SUSPENSION ORAL at 09:05

## 2021-05-04 RX ADMIN — INSULIN ASPART 4 UNITS: 100 INJECTION, SOLUTION INTRAVENOUS; SUBCUTANEOUS at 09:05

## 2021-05-04 RX ADMIN — MUPIROCIN: 20 OINTMENT TOPICAL at 09:05

## 2021-05-04 RX ADMIN — INSULIN ASPART 8 UNITS: 100 INJECTION, SOLUTION INTRAVENOUS; SUBCUTANEOUS at 12:05

## 2021-05-04 RX ADMIN — CARVEDILOL 3.12 MG: 3.12 TABLET, FILM COATED ORAL at 09:05

## 2021-05-04 RX ADMIN — ASPIRIN 81 MG: 81 TABLET, CHEWABLE ORAL at 09:05

## 2021-05-04 RX ADMIN — FERROUS GLUCONATE TAB 324 MG (37.5 MG ELEMENTAL IRON) 324 MG: 324 (37.5 FE) TAB at 05:05

## 2021-05-04 RX ADMIN — CLOPIDOGREL 75 MG: 75 TABLET, FILM COATED ORAL at 09:05

## 2021-05-05 ENCOUNTER — PATIENT OUTREACH (OUTPATIENT)
Dept: ADMINISTRATIVE | Facility: CLINIC | Age: 66
End: 2021-05-05

## 2021-05-18 ENCOUNTER — HOSPITAL ENCOUNTER (EMERGENCY)
Facility: HOSPITAL | Age: 66
Discharge: HOME | End: 2021-05-18
Attending: EMERGENCY MEDICINE
Payer: COMMERCIAL

## 2021-05-18 VITALS
OXYGEN SATURATION: 100 % | BODY MASS INDEX: 24.25 KG/M2 | HEART RATE: 94 BPM | TEMPERATURE: 98 F | HEIGHT: 68 IN | WEIGHT: 160 LBS | RESPIRATION RATE: 18 BRPM | DIASTOLIC BLOOD PRESSURE: 90 MMHG | SYSTOLIC BLOOD PRESSURE: 186 MMHG

## 2021-05-18 DIAGNOSIS — R33.9 URINARY RETENTION: Primary | ICD-10-CM

## 2021-05-18 PROCEDURE — 99281 EMR DPT VST MAYX REQ PHY/QHP: CPT

## 2021-05-19 ENCOUNTER — OFFICE VISIT (OUTPATIENT)
Dept: UROLOGY | Facility: CLINIC | Age: 66
End: 2021-05-19
Payer: COMMERCIAL

## 2021-05-19 ENCOUNTER — PES CALL (OUTPATIENT)
Dept: ADMINISTRATIVE | Facility: CLINIC | Age: 66
End: 2021-05-19

## 2021-05-19 VITALS
BODY MASS INDEX: 24.25 KG/M2 | HEIGHT: 68 IN | WEIGHT: 160 LBS | SYSTOLIC BLOOD PRESSURE: 144 MMHG | DIASTOLIC BLOOD PRESSURE: 80 MMHG

## 2021-05-19 DIAGNOSIS — R33.9 URINARY RETENTION: ICD-10-CM

## 2021-05-19 PROCEDURE — 99214 OFFICE O/P EST MOD 30 MIN: CPT | Mod: 25,S$GLB,, | Performed by: NURSE PRACTITIONER

## 2021-05-19 PROCEDURE — 99214 OFFICE O/P EST MOD 30 MIN: CPT | Mod: PBBFAC | Performed by: NURSE PRACTITIONER

## 2021-05-19 PROCEDURE — 51700 IRRIGATION OF BLADDER: CPT | Mod: S$GLB,,, | Performed by: NURSE PRACTITIONER

## 2021-05-19 PROCEDURE — 99214 PR OFFICE/OUTPT VISIT, EST, LEVL IV, 30-39 MIN: ICD-10-PCS | Mod: 25,S$GLB,, | Performed by: NURSE PRACTITIONER

## 2021-05-19 PROCEDURE — 99999 PR PBB SHADOW E&M-EST. PATIENT-LVL IV: CPT | Mod: PBBFAC,,, | Performed by: NURSE PRACTITIONER

## 2021-05-19 PROCEDURE — 51700 IRRIGATION OF BLADDER: CPT | Mod: PBBFAC | Performed by: NURSE PRACTITIONER

## 2021-05-19 PROCEDURE — 51700 PR IRRIGATION, BLADDER: ICD-10-PCS | Mod: S$GLB,,, | Performed by: NURSE PRACTITIONER

## 2021-05-19 PROCEDURE — 99999 PR PBB SHADOW E&M-EST. PATIENT-LVL IV: ICD-10-PCS | Mod: PBBFAC,,, | Performed by: NURSE PRACTITIONER

## 2021-06-13 ENCOUNTER — ANESTHESIA EVENT (OUTPATIENT)
Dept: SURGERY | Facility: HOSPITAL | Age: 66
DRG: 853 | End: 2021-06-13
Payer: COMMERCIAL

## 2021-06-13 ENCOUNTER — HOSPITAL ENCOUNTER (INPATIENT)
Facility: HOSPITAL | Age: 66
LOS: 6 days | Discharge: HOME | DRG: 853 | End: 2021-06-19
Attending: EMERGENCY MEDICINE | Admitting: HOSPITALIST
Payer: COMMERCIAL

## 2021-06-13 ENCOUNTER — ANESTHESIA (OUTPATIENT)
Dept: SURGERY | Facility: HOSPITAL | Age: 66
DRG: 853 | End: 2021-06-13
Payer: COMMERCIAL

## 2021-06-13 DIAGNOSIS — R00.0 TACHYCARDIA: ICD-10-CM

## 2021-06-13 DIAGNOSIS — N39.0 URINARY TRACT INFECTION WITHOUT HEMATURIA, SITE UNSPECIFIED: ICD-10-CM

## 2021-06-13 DIAGNOSIS — N13.2 URETERAL STONE WITH HYDRONEPHROSIS: ICD-10-CM

## 2021-06-13 DIAGNOSIS — E87.5 HYPERKALEMIA: ICD-10-CM

## 2021-06-13 DIAGNOSIS — N17.9 ACUTE RENAL FAILURE SUPERIMPOSED ON CHRONIC KIDNEY DISEASE, UNSPECIFIED CKD STAGE, UNSPECIFIED ACUTE RENAL FAILURE TYPE: ICD-10-CM

## 2021-06-13 DIAGNOSIS — R06.03 RESPIRATORY DISTRESS: ICD-10-CM

## 2021-06-13 DIAGNOSIS — R06.02 SOB (SHORTNESS OF BREATH): ICD-10-CM

## 2021-06-13 DIAGNOSIS — N18.9 ACUTE RENAL FAILURE SUPERIMPOSED ON CHRONIC KIDNEY DISEASE, UNSPECIFIED CKD STAGE, UNSPECIFIED ACUTE RENAL FAILURE TYPE: ICD-10-CM

## 2021-06-13 DIAGNOSIS — J96.90 RESPIRATORY FAILURE: ICD-10-CM

## 2021-06-13 DIAGNOSIS — A41.9 SEPSIS: Primary | ICD-10-CM

## 2021-06-13 DIAGNOSIS — R07.9 CHEST PAIN: ICD-10-CM

## 2021-06-13 PROBLEM — N18.4 CKD (CHRONIC KIDNEY DISEASE), STAGE IV: Status: ACTIVE | Noted: 2017-06-21

## 2021-06-13 PROBLEM — R65.20 SEVERE SEPSIS: Status: ACTIVE | Noted: 2021-06-13

## 2021-06-13 PROBLEM — R74.01 TRANSAMINITIS: Status: ACTIVE | Noted: 2021-06-13

## 2021-06-13 PROBLEM — N18.4 ANEMIA IN STAGE 4 CHRONIC KIDNEY DISEASE: Status: ACTIVE | Noted: 2020-08-19

## 2021-06-13 PROBLEM — E87.20 METABOLIC ACIDOSIS: Status: ACTIVE | Noted: 2021-06-13

## 2021-06-13 PROBLEM — E87.20 LACTIC ACIDOSIS: Status: ACTIVE | Noted: 2021-06-13

## 2021-06-13 PROBLEM — N13.30 HYDRONEPHROSIS: Status: ACTIVE | Noted: 2021-06-13

## 2021-06-13 PROBLEM — I50.33 ACUTE ON CHRONIC DIASTOLIC CONGESTIVE HEART FAILURE: Status: ACTIVE | Noted: 2020-08-23

## 2021-06-13 PROBLEM — U07.1 COVID-19: Status: RESOLVED | Noted: 2020-08-19 | Resolved: 2021-06-13

## 2021-06-13 PROBLEM — G93.41 ENCEPHALOPATHY, METABOLIC: Status: ACTIVE | Noted: 2021-06-13

## 2021-06-13 LAB
ALBUMIN SERPL BCP-MCNC: 3.1 G/DL (ref 3.5–5.2)
ALLENS TEST: ABNORMAL
ALLENS TEST: ABNORMAL
ALP SERPL-CCNC: 160 U/L (ref 55–135)
ALT SERPL W/O P-5'-P-CCNC: 112 U/L (ref 10–44)
ANION GAP SERPL CALC-SCNC: 15 MMOL/L (ref 8–16)
ANION GAP SERPL CALC-SCNC: 15 MMOL/L (ref 8–16)
ANION GAP SERPL CALC-SCNC: 16 MMOL/L (ref 8–16)
AST SERPL-CCNC: 126 U/L (ref 10–40)
B-OH-BUTYR BLD STRIP-SCNC: 0 MMOL/L (ref 0–0.5)
BACTERIA #/AREA URNS HPF: ABNORMAL /HPF
BASOPHILS NFR BLD: 0 % (ref 0–1.9)
BILIRUB SERPL-MCNC: 1.6 MG/DL (ref 0.1–1)
BILIRUB UR QL STRIP: NEGATIVE
BNP SERPL-MCNC: 445 PG/ML (ref 0–99)
BUN SERPL-MCNC: 45 MG/DL (ref 8–23)
BUN SERPL-MCNC: 48 MG/DL (ref 8–23)
BUN SERPL-MCNC: 48 MG/DL (ref 8–23)
CALCIUM SERPL-MCNC: 8.6 MG/DL (ref 8.7–10.5)
CALCIUM SERPL-MCNC: 8.6 MG/DL (ref 8.7–10.5)
CALCIUM SERPL-MCNC: 8.7 MG/DL (ref 8.7–10.5)
CHLORIDE SERPL-SCNC: 100 MMOL/L (ref 95–110)
CHLORIDE SERPL-SCNC: 101 MMOL/L (ref 95–110)
CHLORIDE SERPL-SCNC: 101 MMOL/L (ref 95–110)
CK SERPL-CCNC: 71 U/L (ref 20–200)
CLARITY UR: ABNORMAL
CO2 SERPL-SCNC: 17 MMOL/L (ref 23–29)
CO2 SERPL-SCNC: 19 MMOL/L (ref 23–29)
CO2 SERPL-SCNC: 19 MMOL/L (ref 23–29)
COLOR UR: YELLOW
CREAT SERPL-MCNC: 5.3 MG/DL (ref 0.5–1.4)
CREAT SERPL-MCNC: 5.3 MG/DL (ref 0.5–1.4)
CREAT SERPL-MCNC: 5.6 MG/DL (ref 0.5–1.4)
CREAT UR-MCNC: 104.1 MG/DL (ref 23–375)
CREAT UR-MCNC: 104.1 MG/DL (ref 23–375)
CTP QC/QA: YES
DELSYS: ABNORMAL
DELSYS: ABNORMAL
DIFFERENTIAL METHOD: ABNORMAL
EOSINOPHIL NFR BLD: 0 % (ref 0–8)
ERYTHROCYTE [DISTWIDTH] IN BLOOD BY AUTOMATED COUNT: 11.8 % (ref 11.5–14.5)
ERYTHROCYTE [SEDIMENTATION RATE] IN BLOOD BY WESTERGREN METHOD: 22 MM/H
ERYTHROCYTE [SEDIMENTATION RATE] IN BLOOD BY WESTERGREN METHOD: 24 MM/H
EST. GFR  (AFRICAN AMERICAN): 11 ML/MIN/1.73 M^2
EST. GFR  (AFRICAN AMERICAN): 12 ML/MIN/1.73 M^2
EST. GFR  (AFRICAN AMERICAN): 12 ML/MIN/1.73 M^2
EST. GFR  (NON AFRICAN AMERICAN): 10 ML/MIN/1.73 M^2
FIO2: 100
FIO2: 70
GLUCOSE SERPL-MCNC: 381 MG/DL (ref 70–110)
GLUCOSE SERPL-MCNC: 391 MG/DL (ref 70–110)
GLUCOSE SERPL-MCNC: 391 MG/DL (ref 70–110)
GLUCOSE UR QL STRIP: ABNORMAL
HCO3 UR-SCNC: 23.7 MMOL/L (ref 24–28)
HCO3 UR-SCNC: 25.4 MMOL/L (ref 24–28)
HCT VFR BLD AUTO: 34.7 % (ref 40–54)
HGB BLD-MCNC: 11.9 G/DL (ref 14–18)
HGB UR QL STRIP: ABNORMAL
HYALINE CASTS #/AREA URNS LPF: 0 /LPF
IMM GRANULOCYTES # BLD AUTO: ABNORMAL K/UL (ref 0–0.04)
IMM GRANULOCYTES NFR BLD AUTO: ABNORMAL % (ref 0–0.5)
INR PPP: 1.2 (ref 0.8–1.2)
KETONES UR QL STRIP: NEGATIVE
LACTATE SERPL-SCNC: 4.5 MMOL/L (ref 0.5–2.2)
LACTATE SERPL-SCNC: 5.5 MMOL/L (ref 0.5–2.2)
LACTATE SERPL-SCNC: 6 MMOL/L (ref 0.5–2.2)
LEUKOCYTE ESTERASE UR QL STRIP: NEGATIVE
LIPASE SERPL-CCNC: 22 U/L (ref 4–60)
LYMPHOCYTES NFR BLD: 10 % (ref 18–48)
MAGNESIUM SERPL-MCNC: 1.7 MG/DL (ref 1.6–2.6)
MCH RBC QN AUTO: 31.3 PG (ref 27–31)
MCHC RBC AUTO-ENTMCNC: 34.3 G/DL (ref 32–36)
MCV RBC AUTO: 91 FL (ref 82–98)
METAMYELOCYTES NFR BLD MANUAL: 4 %
MICROSCOPIC COMMENT: ABNORMAL
MIN VOL: 11.8
MODE: ABNORMAL
MODE: ABNORMAL
MONOCYTES NFR BLD: 1 % (ref 4–15)
MYELOCYTES NFR BLD MANUAL: 3 %
NEUTROPHILS NFR BLD: 62 % (ref 38–73)
NEUTS BAND NFR BLD MANUAL: 20 %
NITRITE UR QL STRIP: NEGATIVE
NRBC BLD-RTO: 0 /100 WBC
PCO2 BLDA: 32 MMHG (ref 35–45)
PCO2 BLDA: 47.5 MMHG (ref 35–45)
PEEP: 8
PEEP: 8
PH SMN: 7.31 [PH] (ref 7.35–7.45)
PH SMN: 7.51 [PH] (ref 7.35–7.45)
PH UR STRIP: 6 [PH] (ref 5–8)
PHOSPHATE SERPL-MCNC: 3.8 MG/DL (ref 2.7–4.5)
PIP: 27
PLATELET # BLD AUTO: 56 K/UL (ref 150–450)
PMV BLD AUTO: 12.6 FL (ref 9.2–12.9)
PO2 BLDA: 132 MMHG (ref 80–100)
PO2 BLDA: 259 MMHG (ref 80–100)
POC BE: -3 MMOL/L
POC BE: 2 MMOL/L
POC SATURATED O2: 100 % (ref 95–100)
POC SATURATED O2: 99 % (ref 95–100)
POC TCO2: 25 MMOL/L (ref 23–27)
POC TCO2: 26 MMOL/L (ref 23–27)
POCT GLUCOSE: 358 MG/DL (ref 70–110)
POCT GLUCOSE: 373 MG/DL (ref 70–110)
POTASSIUM SERPL-SCNC: 4.9 MMOL/L (ref 3.5–5.1)
POTASSIUM SERPL-SCNC: 4.9 MMOL/L (ref 3.5–5.1)
POTASSIUM SERPL-SCNC: 7.4 MMOL/L (ref 3.5–5.1)
PROCALCITONIN SERPL IA-MCNC: 446.6 NG/ML
PROT SERPL-MCNC: 7.3 G/DL (ref 6–8.4)
PROT UR QL STRIP: ABNORMAL
PROT UR-MCNC: 372 MG/DL
PROT/CREAT UR: 3.57 MG/G{CREAT} (ref 0–0.2)
PROTHROMBIN TIME: 12.4 SEC (ref 9–12.5)
RBC # BLD AUTO: 3.8 M/UL (ref 4.6–6.2)
RBC #/AREA URNS HPF: 15 /HPF (ref 0–4)
SAMPLE: ABNORMAL
SAMPLE: ABNORMAL
SARS-COV-2 RDRP RESP QL NAA+PROBE: NEGATIVE
SITE: ABNORMAL
SITE: ABNORMAL
SODIUM SERPL-SCNC: 133 MMOL/L (ref 136–145)
SODIUM SERPL-SCNC: 135 MMOL/L (ref 136–145)
SODIUM SERPL-SCNC: 135 MMOL/L (ref 136–145)
SODIUM UR-SCNC: 39 MMOL/L (ref 20–250)
SP GR UR STRIP: 1.01 (ref 1–1.03)
SP02: 100
TROPONIN I SERPL DL<=0.01 NG/ML-MCNC: 0.04 NG/ML (ref 0–0.03)
URN SPEC COLLECT METH UR: ABNORMAL
UROBILINOGEN UR STRIP-ACNC: NEGATIVE EU/DL
VT: 450
VT: 500
WBC # BLD AUTO: 6.54 K/UL (ref 3.9–12.7)
WBC #/AREA URNS HPF: 16 /HPF (ref 0–5)
YEAST URNS QL MICRO: ABNORMAL

## 2021-06-13 PROCEDURE — 85027 COMPLETE CBC AUTOMATED: CPT | Performed by: EMERGENCY MEDICINE

## 2021-06-13 PROCEDURE — 80048 BASIC METABOLIC PNL TOTAL CA: CPT | Mod: XB | Performed by: HOSPITALIST

## 2021-06-13 PROCEDURE — 87040 BLOOD CULTURE FOR BACTERIA: CPT | Mod: 59 | Performed by: EMERGENCY MEDICINE

## 2021-06-13 PROCEDURE — 82803 BLOOD GASES ANY COMBINATION: CPT

## 2021-06-13 PROCEDURE — 99223 1ST HOSP IP/OBS HIGH 75: CPT | Mod: 25,,, | Performed by: UROLOGY

## 2021-06-13 PROCEDURE — 87205 SMEAR GRAM STAIN: CPT | Performed by: UROLOGY

## 2021-06-13 PROCEDURE — 25000003 PHARM REV CODE 250: Performed by: INTERNAL MEDICINE

## 2021-06-13 PROCEDURE — 99285 EMERGENCY DEPT VISIT HI MDM: CPT | Mod: 25

## 2021-06-13 PROCEDURE — 93010 EKG 12-LEAD: ICD-10-PCS | Mod: 76,,, | Performed by: INTERNAL MEDICINE

## 2021-06-13 PROCEDURE — 36000706: Performed by: UROLOGY

## 2021-06-13 PROCEDURE — 84484 ASSAY OF TROPONIN QUANT: CPT | Performed by: EMERGENCY MEDICINE

## 2021-06-13 PROCEDURE — 86703 HIV-1/HIV-2 1 RESULT ANTBDY: CPT | Performed by: HOSPITALIST

## 2021-06-13 PROCEDURE — A4217 STERILE WATER/SALINE, 500 ML: HCPCS | Performed by: INTERNAL MEDICINE

## 2021-06-13 PROCEDURE — 87086 URINE CULTURE/COLONY COUNT: CPT | Performed by: EMERGENCY MEDICINE

## 2021-06-13 PROCEDURE — 63600175 PHARM REV CODE 636 W HCPCS: Performed by: NURSE ANESTHETIST, CERTIFIED REGISTERED

## 2021-06-13 PROCEDURE — 93010 ELECTROCARDIOGRAM REPORT: CPT | Mod: ,,, | Performed by: INTERNAL MEDICINE

## 2021-06-13 PROCEDURE — 83690 ASSAY OF LIPASE: CPT | Performed by: EMERGENCY MEDICINE

## 2021-06-13 PROCEDURE — D9220A PRA ANESTHESIA: ICD-10-PCS | Mod: ANES,,, | Performed by: ANESTHESIOLOGY

## 2021-06-13 PROCEDURE — 94640 AIRWAY INHALATION TREATMENT: CPT

## 2021-06-13 PROCEDURE — 63600175 PHARM REV CODE 636 W HCPCS: Performed by: UROLOGY

## 2021-06-13 PROCEDURE — 20000000 HC ICU ROOM

## 2021-06-13 PROCEDURE — 99900026 HC AIRWAY MAINTENANCE (STAT)

## 2021-06-13 PROCEDURE — 94660 CPAP INITIATION&MGMT: CPT

## 2021-06-13 PROCEDURE — 37000009 HC ANESTHESIA EA ADD 15 MINS: Performed by: UROLOGY

## 2021-06-13 PROCEDURE — 53899 PR UROLOGY SURGERY PROCEDURE UNLISTED: ICD-10-PCS | Mod: ,,, | Performed by: UROLOGY

## 2021-06-13 PROCEDURE — 83605 ASSAY OF LACTIC ACID: CPT | Mod: 91 | Performed by: EMERGENCY MEDICINE

## 2021-06-13 PROCEDURE — 84100 ASSAY OF PHOSPHORUS: CPT | Performed by: EMERGENCY MEDICINE

## 2021-06-13 PROCEDURE — 99900035 HC TECH TIME PER 15 MIN (STAT)

## 2021-06-13 PROCEDURE — 36415 COLL VENOUS BLD VENIPUNCTURE: CPT | Performed by: HOSPITALIST

## 2021-06-13 PROCEDURE — 37000008 HC ANESTHESIA 1ST 15 MINUTES: Performed by: UROLOGY

## 2021-06-13 PROCEDURE — D9220A PRA ANESTHESIA: Mod: CRNA,,, | Performed by: NURSE ANESTHETIST, CERTIFIED REGISTERED

## 2021-06-13 PROCEDURE — 88312 SPECIAL STAINS GROUP 1: CPT | Mod: 59 | Performed by: PATHOLOGY

## 2021-06-13 PROCEDURE — D9220A PRA ANESTHESIA: ICD-10-PCS | Mod: CRNA,,, | Performed by: NURSE ANESTHETIST, CERTIFIED REGISTERED

## 2021-06-13 PROCEDURE — 25000003 PHARM REV CODE 250

## 2021-06-13 PROCEDURE — 84145 PROCALCITONIN (PCT): CPT | Performed by: EMERGENCY MEDICINE

## 2021-06-13 PROCEDURE — 25000003 PHARM REV CODE 250: Performed by: UROLOGY

## 2021-06-13 PROCEDURE — D9220A PRA ANESTHESIA: Mod: ANES,,, | Performed by: ANESTHESIOLOGY

## 2021-06-13 PROCEDURE — 83605 ASSAY OF LACTIC ACID: CPT | Mod: 91 | Performed by: HOSPITALIST

## 2021-06-13 PROCEDURE — 88305 TISSUE EXAM BY PATHOLOGIST: CPT | Mod: 26,,, | Performed by: PATHOLOGY

## 2021-06-13 PROCEDURE — 99223 PR INITIAL HOSPITAL CARE,LEVL III: ICD-10-PCS | Mod: 25,,, | Performed by: UROLOGY

## 2021-06-13 PROCEDURE — 93005 ELECTROCARDIOGRAM TRACING: CPT

## 2021-06-13 PROCEDURE — 80053 COMPREHEN METABOLIC PANEL: CPT | Performed by: EMERGENCY MEDICINE

## 2021-06-13 PROCEDURE — 87186 SC STD MICRODIL/AGAR DIL: CPT | Performed by: EMERGENCY MEDICINE

## 2021-06-13 PROCEDURE — 94002 VENT MGMT INPAT INIT DAY: CPT

## 2021-06-13 PROCEDURE — 84300 ASSAY OF URINE SODIUM: CPT | Performed by: UROLOGY

## 2021-06-13 PROCEDURE — 25000003 PHARM REV CODE 250: Performed by: EMERGENCY MEDICINE

## 2021-06-13 PROCEDURE — C1758 CATHETER, URETERAL: HCPCS | Performed by: UROLOGY

## 2021-06-13 PROCEDURE — 88305 TISSUE EXAM BY PATHOLOGIST: CPT | Performed by: PATHOLOGY

## 2021-06-13 PROCEDURE — C9399 UNCLASSIFIED DRUGS OR BIOLOG: HCPCS | Performed by: HOSPITALIST

## 2021-06-13 PROCEDURE — 36000707: Performed by: UROLOGY

## 2021-06-13 PROCEDURE — 83735 ASSAY OF MAGNESIUM: CPT | Performed by: EMERGENCY MEDICINE

## 2021-06-13 PROCEDURE — 25000242 PHARM REV CODE 250 ALT 637 W/ HCPCS: Performed by: EMERGENCY MEDICINE

## 2021-06-13 PROCEDURE — 25000003 PHARM REV CODE 250: Performed by: HOSPITALIST

## 2021-06-13 PROCEDURE — 94761 N-INVAS EAR/PLS OXIMETRY MLT: CPT

## 2021-06-13 PROCEDURE — 82550 ASSAY OF CK (CPK): CPT | Performed by: HOSPITALIST

## 2021-06-13 PROCEDURE — 85007 BL SMEAR W/DIFF WBC COUNT: CPT | Performed by: EMERGENCY MEDICINE

## 2021-06-13 PROCEDURE — 53899 UNLISTED PX URINARY SYSTEM: CPT | Mod: ,,, | Performed by: UROLOGY

## 2021-06-13 PROCEDURE — C2617 STENT, NON-COR, TEM W/O DEL: HCPCS | Performed by: UROLOGY

## 2021-06-13 PROCEDURE — 63600175 PHARM REV CODE 636 W HCPCS: Performed by: HOSPITALIST

## 2021-06-13 PROCEDURE — 82570 ASSAY OF URINE CREATININE: CPT | Performed by: UROLOGY

## 2021-06-13 PROCEDURE — 87077 CULTURE AEROBIC IDENTIFY: CPT | Performed by: EMERGENCY MEDICINE

## 2021-06-13 PROCEDURE — 82010 KETONE BODYS QUAN: CPT | Performed by: HOSPITALIST

## 2021-06-13 PROCEDURE — 27000221 HC OXYGEN, UP TO 24 HOURS

## 2021-06-13 PROCEDURE — 99499 UNLISTED E&M SERVICE: CPT | Mod: ,,, | Performed by: INTERNAL MEDICINE

## 2021-06-13 PROCEDURE — 25000242 PHARM REV CODE 250 ALT 637 W/ HCPCS: Performed by: UROLOGY

## 2021-06-13 PROCEDURE — 25000003 PHARM REV CODE 250: Performed by: NURSE ANESTHETIST, CERTIFIED REGISTERED

## 2021-06-13 PROCEDURE — 36600 WITHDRAWAL OF ARTERIAL BLOOD: CPT

## 2021-06-13 PROCEDURE — 99499 NO LOS: ICD-10-PCS | Mod: ,,, | Performed by: INTERNAL MEDICINE

## 2021-06-13 PROCEDURE — 52332 CYSTOSCOPY AND TREATMENT: CPT | Mod: 51,RT,, | Performed by: UROLOGY

## 2021-06-13 PROCEDURE — 81000 URINALYSIS NONAUTO W/SCOPE: CPT | Performed by: EMERGENCY MEDICINE

## 2021-06-13 PROCEDURE — 80074 ACUTE HEPATITIS PANEL: CPT | Performed by: HOSPITALIST

## 2021-06-13 PROCEDURE — 52332 PR CYSTOSCOPY,INSERT URETERAL STENT: ICD-10-PCS | Mod: 51,RT,, | Performed by: UROLOGY

## 2021-06-13 PROCEDURE — 63600175 PHARM REV CODE 636 W HCPCS: Performed by: EMERGENCY MEDICINE

## 2021-06-13 PROCEDURE — 88305 TISSUE EXAM BY PATHOLOGIST: ICD-10-PCS | Mod: 26,,, | Performed by: PATHOLOGY

## 2021-06-13 PROCEDURE — 88312 SPECIAL STAINS GROUP 1: CPT | Mod: 26,,, | Performed by: PATHOLOGY

## 2021-06-13 PROCEDURE — 27000190 HC CPAP FULL FACE MASK W/VALVE

## 2021-06-13 PROCEDURE — 87205 SMEAR GRAM STAIN: CPT | Mod: 59 | Performed by: HOSPITALIST

## 2021-06-13 PROCEDURE — C1729 CATH, DRAINAGE: HCPCS | Performed by: UROLOGY

## 2021-06-13 PROCEDURE — A4217 STERILE WATER/SALINE, 500 ML: HCPCS | Performed by: UROLOGY

## 2021-06-13 PROCEDURE — C1769 GUIDE WIRE: HCPCS | Performed by: UROLOGY

## 2021-06-13 PROCEDURE — 88312 PR  SPECIAL STAINS,GROUP I: ICD-10-PCS | Mod: 26,,, | Performed by: PATHOLOGY

## 2021-06-13 PROCEDURE — 87070 CULTURE OTHR SPECIMN AEROBIC: CPT | Performed by: HOSPITALIST

## 2021-06-13 PROCEDURE — U0002 COVID-19 LAB TEST NON-CDC: HCPCS | Performed by: EMERGENCY MEDICINE

## 2021-06-13 PROCEDURE — 83880 ASSAY OF NATRIURETIC PEPTIDE: CPT | Performed by: EMERGENCY MEDICINE

## 2021-06-13 PROCEDURE — 85610 PROTHROMBIN TIME: CPT | Performed by: EMERGENCY MEDICINE

## 2021-06-13 DEVICE — STENT URET PERCUFLEX 6FR 26CM: Type: IMPLANTABLE DEVICE | Site: URETER | Status: FUNCTIONAL

## 2021-06-13 RX ORDER — ONDANSETRON 2 MG/ML
INJECTION INTRAMUSCULAR; INTRAVENOUS
Status: DISCONTINUED | OUTPATIENT
Start: 2021-06-13 | End: 2021-06-13

## 2021-06-13 RX ORDER — IBUPROFEN 200 MG
24 TABLET ORAL
Status: DISCONTINUED | OUTPATIENT
Start: 2021-06-13 | End: 2021-06-19 | Stop reason: HOSPADM

## 2021-06-13 RX ORDER — TAMSULOSIN HYDROCHLORIDE 0.4 MG/1
0.4 CAPSULE ORAL DAILY
Status: DISCONTINUED | OUTPATIENT
Start: 2021-06-14 | End: 2021-06-13

## 2021-06-13 RX ORDER — AMOXICILLIN 250 MG
1 CAPSULE ORAL 2 TIMES DAILY
Status: DISCONTINUED | OUTPATIENT
Start: 2021-06-13 | End: 2021-06-13

## 2021-06-13 RX ORDER — CARVEDILOL 3.12 MG/1
3.12 TABLET ORAL 2 TIMES DAILY
Status: DISCONTINUED | OUTPATIENT
Start: 2021-06-13 | End: 2021-06-19 | Stop reason: HOSPADM

## 2021-06-13 RX ORDER — MUPIROCIN 20 MG/G
OINTMENT TOPICAL 2 TIMES DAILY
Status: COMPLETED | OUTPATIENT
Start: 2021-06-13 | End: 2021-06-18

## 2021-06-13 RX ORDER — INSULIN ASPART 100 [IU]/ML
0-5 INJECTION, SOLUTION INTRAVENOUS; SUBCUTANEOUS EVERY 6 HOURS PRN
Status: DISCONTINUED | OUTPATIENT
Start: 2021-06-13 | End: 2021-06-19 | Stop reason: HOSPADM

## 2021-06-13 RX ORDER — INDOMETHACIN 25 MG/1
100 CAPSULE ORAL ONCE
Status: COMPLETED | OUTPATIENT
Start: 2021-06-13 | End: 2021-06-13

## 2021-06-13 RX ORDER — NAPROXEN SODIUM 220 MG/1
81 TABLET, FILM COATED ORAL DAILY
Status: DISCONTINUED | OUTPATIENT
Start: 2021-06-14 | End: 2021-06-19 | Stop reason: HOSPADM

## 2021-06-13 RX ORDER — ACETAMINOPHEN 325 MG/1
650 TABLET ORAL EVERY 4 HOURS PRN
Status: DISCONTINUED | OUTPATIENT
Start: 2021-06-13 | End: 2021-06-13

## 2021-06-13 RX ORDER — MEROPENEM AND SODIUM CHLORIDE 500 MG/50ML
500 INJECTION, SOLUTION INTRAVENOUS
Status: DISCONTINUED | OUTPATIENT
Start: 2021-06-13 | End: 2021-06-16

## 2021-06-13 RX ORDER — PHENYLEPHRINE HYDROCHLORIDE 10 MG/ML
INJECTION INTRAVENOUS
Status: DISCONTINUED | OUTPATIENT
Start: 2021-06-13 | End: 2021-06-13

## 2021-06-13 RX ORDER — IPRATROPIUM BROMIDE AND ALBUTEROL SULFATE 2.5; .5 MG/3ML; MG/3ML
3 SOLUTION RESPIRATORY (INHALATION) EVERY 6 HOURS
Status: DISCONTINUED | OUTPATIENT
Start: 2021-06-13 | End: 2021-06-16

## 2021-06-13 RX ORDER — PROPOFOL 10 MG/ML
20 INJECTION, EMULSION INTRAVENOUS
Status: COMPLETED | OUTPATIENT
Start: 2021-06-13 | End: 2021-06-13

## 2021-06-13 RX ORDER — CHLORHEXIDINE GLUCONATE ORAL RINSE 1.2 MG/ML
15 SOLUTION DENTAL 2 TIMES DAILY
Status: DISCONTINUED | OUTPATIENT
Start: 2021-06-13 | End: 2021-06-14

## 2021-06-13 RX ORDER — ROCURONIUM BROMIDE 10 MG/ML
1 INJECTION, SOLUTION INTRAVENOUS
Status: COMPLETED | OUTPATIENT
Start: 2021-06-13 | End: 2021-06-13

## 2021-06-13 RX ORDER — FAMOTIDINE 10 MG/ML
20 INJECTION INTRAVENOUS DAILY
Status: DISCONTINUED | OUTPATIENT
Start: 2021-06-14 | End: 2021-06-19 | Stop reason: HOSPADM

## 2021-06-13 RX ORDER — FENTANYL CITRATE 50 UG/ML
50 INJECTION, SOLUTION INTRAMUSCULAR; INTRAVENOUS
Status: DISCONTINUED | OUTPATIENT
Start: 2021-06-17 | End: 2021-06-17 | Stop reason: HOSPADM

## 2021-06-13 RX ORDER — CARVEDILOL 3.12 MG/1
3.12 TABLET ORAL 2 TIMES DAILY
Status: DISCONTINUED | OUTPATIENT
Start: 2021-06-13 | End: 2021-06-13

## 2021-06-13 RX ORDER — NAPROXEN SODIUM 220 MG/1
81 TABLET, FILM COATED ORAL DAILY
Status: DISCONTINUED | OUTPATIENT
Start: 2021-06-14 | End: 2021-06-13

## 2021-06-13 RX ORDER — ACETAMINOPHEN 500 MG
1000 TABLET ORAL
Status: DISCONTINUED | OUTPATIENT
Start: 2021-06-13 | End: 2021-06-13

## 2021-06-13 RX ORDER — SODIUM CHLORIDE 0.9 % (FLUSH) 0.9 %
10 SYRINGE (ML) INJECTION EVERY 8 HOURS
Status: DISCONTINUED | OUTPATIENT
Start: 2021-06-13 | End: 2021-06-13

## 2021-06-13 RX ORDER — ACETAMINOPHEN 650 MG/1
650 SUPPOSITORY RECTAL
Status: COMPLETED | OUTPATIENT
Start: 2021-06-13 | End: 2021-06-13

## 2021-06-13 RX ORDER — HEPARIN SODIUM 5000 [USP'U]/ML
5000 INJECTION, SOLUTION INTRAVENOUS; SUBCUTANEOUS EVERY 8 HOURS
Status: DISCONTINUED | OUTPATIENT
Start: 2021-06-13 | End: 2021-06-13

## 2021-06-13 RX ORDER — GLUCAGON 1 MG
1 KIT INJECTION
Status: DISCONTINUED | OUTPATIENT
Start: 2021-06-13 | End: 2021-06-19 | Stop reason: HOSPADM

## 2021-06-13 RX ORDER — ROCURONIUM BROMIDE 10 MG/ML
INJECTION, SOLUTION INTRAVENOUS
Status: DISCONTINUED | OUTPATIENT
Start: 2021-06-13 | End: 2021-06-13

## 2021-06-13 RX ORDER — SODIUM CHLORIDE 9 MG/ML
INJECTION, SOLUTION INTRAVENOUS CONTINUOUS
Status: DISCONTINUED | OUTPATIENT
Start: 2021-06-13 | End: 2021-06-15

## 2021-06-13 RX ORDER — IBUPROFEN 200 MG
16 TABLET ORAL
Status: DISCONTINUED | OUTPATIENT
Start: 2021-06-13 | End: 2021-06-19 | Stop reason: HOSPADM

## 2021-06-13 RX ORDER — ROCURONIUM BROMIDE 10 MG/ML
INJECTION, SOLUTION INTRAVENOUS
Status: COMPLETED
Start: 2021-06-13 | End: 2021-06-13

## 2021-06-13 RX ORDER — FENTANYL CITRATE 50 UG/ML
50 INJECTION, SOLUTION INTRAMUSCULAR; INTRAVENOUS
Status: ACTIVE | OUTPATIENT
Start: 2021-06-13 | End: 2021-06-16

## 2021-06-13 RX ORDER — HYDRALAZINE HYDROCHLORIDE 20 MG/ML
10 INJECTION INTRAMUSCULAR; INTRAVENOUS
Status: COMPLETED | OUTPATIENT
Start: 2021-06-13 | End: 2021-06-13

## 2021-06-13 RX ORDER — ALBUTEROL SULFATE 2.5 MG/.5ML
10 SOLUTION RESPIRATORY (INHALATION)
Status: COMPLETED | OUTPATIENT
Start: 2021-06-13 | End: 2021-06-13

## 2021-06-13 RX ORDER — INDOMETHACIN 25 MG/1
50 CAPSULE ORAL
Status: COMPLETED | OUTPATIENT
Start: 2021-06-13 | End: 2021-06-13

## 2021-06-13 RX ORDER — ONDANSETRON 2 MG/ML
4 INJECTION INTRAMUSCULAR; INTRAVENOUS EVERY 8 HOURS PRN
Status: DISCONTINUED | OUTPATIENT
Start: 2021-06-13 | End: 2021-06-14

## 2021-06-13 RX ORDER — FENTANYL CITRATE 50 UG/ML
INJECTION, SOLUTION INTRAMUSCULAR; INTRAVENOUS
Status: DISCONTINUED | OUTPATIENT
Start: 2021-06-13 | End: 2021-06-13

## 2021-06-13 RX ORDER — ETOMIDATE 2 MG/ML
20 INJECTION INTRAVENOUS
Status: COMPLETED | OUTPATIENT
Start: 2021-06-13 | End: 2021-06-13

## 2021-06-13 RX ORDER — AMOXICILLIN 250 MG
1 CAPSULE ORAL 2 TIMES DAILY
Status: DISCONTINUED | OUTPATIENT
Start: 2021-06-13 | End: 2021-06-19 | Stop reason: HOSPADM

## 2021-06-13 RX ORDER — PROPOFOL 10 MG/ML
0-50 INJECTION, EMULSION INTRAVENOUS CONTINUOUS
Status: DISCONTINUED | OUTPATIENT
Start: 2021-06-13 | End: 2021-06-14

## 2021-06-13 RX ORDER — HEPARIN SODIUM 5000 [USP'U]/ML
5000 INJECTION, SOLUTION INTRAVENOUS; SUBCUTANEOUS EVERY 8 HOURS
Status: DISCONTINUED | OUTPATIENT
Start: 2021-06-14 | End: 2021-06-19 | Stop reason: HOSPADM

## 2021-06-13 RX ADMIN — PHENYLEPHRINE HYDROCHLORIDE 200 MCG: 10 INJECTION INTRAVENOUS at 06:06

## 2021-06-13 RX ADMIN — ACETAMINOPHEN 650 MG: 650 SUPPOSITORY RECTAL at 05:06

## 2021-06-13 RX ADMIN — ETOMIDATE 20 MG: 2 INJECTION INTRAVENOUS at 04:06

## 2021-06-13 RX ADMIN — DOCUSATE SODIUM 50 MG AND SENNOSIDES 8.6 MG 1 TABLET: 8.6; 5 TABLET, FILM COATED ORAL at 08:06

## 2021-06-13 RX ADMIN — MUPIROCIN: 20 OINTMENT TOPICAL at 08:06

## 2021-06-13 RX ADMIN — VANCOMYCIN HYDROCHLORIDE 1250 MG: 1.25 INJECTION, POWDER, LYOPHILIZED, FOR SOLUTION INTRAVENOUS at 05:06

## 2021-06-13 RX ADMIN — SODIUM BICARBONATE 50 MEQ: 84 INJECTION, SOLUTION INTRAVENOUS at 03:06

## 2021-06-13 RX ADMIN — SODIUM BICARBONATE 100 MEQ: 84 INJECTION, SOLUTION INTRAVENOUS at 05:06

## 2021-06-13 RX ADMIN — ROCURONIUM BROMIDE 68 MG: 10 INJECTION, SOLUTION INTRAVENOUS at 04:06

## 2021-06-13 RX ADMIN — PIPERACILLIN SODIUM AND TAZOBACTAM SODIUM 4.5 G: 4; .5 INJECTION, POWDER, FOR SOLUTION INTRAVENOUS at 04:06

## 2021-06-13 RX ADMIN — ALBUTEROL SULFATE 10 MG: 2.5 SOLUTION RESPIRATORY (INHALATION) at 03:06

## 2021-06-13 RX ADMIN — SODIUM CHLORIDE, SODIUM LACTATE, POTASSIUM CHLORIDE, AND CALCIUM CHLORIDE 2040 ML: .6; .31; .03; .02 INJECTION, SOLUTION INTRAVENOUS at 02:06

## 2021-06-13 RX ADMIN — INSULIN HUMAN 7 UNITS: 100 INJECTION, SOLUTION PARENTERAL at 03:06

## 2021-06-13 RX ADMIN — 0.12% CHLORHEXIDINE GLUCONATE 15 ML: 1.2 RINSE ORAL at 08:06

## 2021-06-13 RX ADMIN — HYDRALAZINE HYDROCHLORIDE 10 MG: 20 INJECTION, SOLUTION INTRAMUSCULAR; INTRAVENOUS at 04:06

## 2021-06-13 RX ADMIN — SODIUM CHLORIDE 5 ML/HR: 0.9 INJECTION, SOLUTION INTRAVENOUS at 07:06

## 2021-06-13 RX ADMIN — INSULIN DETEMIR 10 UNITS: 100 INJECTION, SOLUTION SUBCUTANEOUS at 08:06

## 2021-06-13 RX ADMIN — NITROGLYCERIN 1 INCH: 20 OINTMENT TOPICAL at 04:06

## 2021-06-13 RX ADMIN — DEXTROSE MONOHYDRATE 25 G: 25 INJECTION, SOLUTION INTRAVENOUS at 03:06

## 2021-06-13 RX ADMIN — SODIUM CHLORIDE: 0.9 INJECTION, SOLUTION INTRAVENOUS at 06:06

## 2021-06-13 RX ADMIN — SODIUM BICARBONATE: 84 INJECTION, SOLUTION INTRAVENOUS at 10:06

## 2021-06-13 RX ADMIN — ONDANSETRON 4 MG: 2 INJECTION, SOLUTION INTRAMUSCULAR; INTRAVENOUS at 06:06

## 2021-06-13 RX ADMIN — PROPOFOL 5 MCG/KG/MIN: 10 INJECTION, EMULSION INTRAVENOUS at 09:06

## 2021-06-13 RX ADMIN — CARVEDILOL 3.12 MG: 3.12 TABLET, FILM COATED ORAL at 08:06

## 2021-06-13 RX ADMIN — MEROPENEM AND SODIUM CHLORIDE 500 MG: 500 INJECTION, SOLUTION INTRAVENOUS at 08:06

## 2021-06-13 RX ADMIN — FENTANYL CITRATE 100 MCG: 50 INJECTION, SOLUTION INTRAMUSCULAR; INTRAVENOUS at 06:06

## 2021-06-13 RX ADMIN — CALCIUM GLUCONATE 1 G: 98 INJECTION, SOLUTION INTRAVENOUS at 03:06

## 2021-06-13 RX ADMIN — IPRATROPIUM BROMIDE AND ALBUTEROL SULFATE 3 ML: .5; 3 SOLUTION RESPIRATORY (INHALATION) at 09:06

## 2021-06-13 RX ADMIN — PROPOFOL 30 MCG/KG/MIN: 10 INJECTION, EMULSION INTRAVENOUS at 04:06

## 2021-06-13 RX ADMIN — ROCURONIUM BROMIDE 20 MG: 10 INJECTION, SOLUTION INTRAVENOUS at 06:06

## 2021-06-13 RX ADMIN — INSULIN ASPART 3 UNITS: 100 INJECTION, SOLUTION INTRAVENOUS; SUBCUTANEOUS at 09:06

## 2021-06-13 RX ADMIN — SODIUM BICARBONATE: 84 INJECTION, SOLUTION INTRAVENOUS at 08:06

## 2021-06-14 PROBLEM — I50.33 ACUTE ON CHRONIC DIASTOLIC CONGESTIVE HEART FAILURE: Status: RESOLVED | Noted: 2020-08-23 | Resolved: 2021-06-14

## 2021-06-14 LAB
ALBUMIN SERPL BCP-MCNC: 2.3 G/DL (ref 3.5–5.2)
ALLENS TEST: ABNORMAL
ALP SERPL-CCNC: 121 U/L (ref 55–135)
ALT SERPL W/O P-5'-P-CCNC: 71 U/L (ref 10–44)
ANION GAP SERPL CALC-SCNC: 11 MMOL/L (ref 8–16)
ANION GAP SERPL CALC-SCNC: 16 MMOL/L (ref 8–16)
ASCENDING AORTA: 3.02 CM
AST SERPL-CCNC: 63 U/L (ref 10–40)
AV INDEX (PROSTH): 0.65
AV MEAN GRADIENT: 11 MMHG
AV PEAK GRADIENT: 17 MMHG
AV VALVE AREA: 1.88 CM2
AV VELOCITY RATIO: 0.54
BASOPHILS # BLD AUTO: ABNORMAL K/UL (ref 0–0.2)
BASOPHILS NFR BLD: 0 % (ref 0–1.9)
BASOPHILS NFR BLD: 0 % (ref 0–1.9)
BILIRUB SERPL-MCNC: 2 MG/DL (ref 0.1–1)
BSA FOR ECHO PROCEDURE: 1.89 M2
BUN SERPL-MCNC: 51 MG/DL (ref 8–23)
BUN SERPL-MCNC: 52 MG/DL (ref 8–23)
CALCIUM SERPL-MCNC: 8 MG/DL (ref 8.7–10.5)
CALCIUM SERPL-MCNC: 8.2 MG/DL (ref 8.7–10.5)
CHLORIDE SERPL-SCNC: 100 MMOL/L (ref 95–110)
CHLORIDE SERPL-SCNC: 95 MMOL/L (ref 95–110)
CO2 SERPL-SCNC: 21 MMOL/L (ref 23–29)
CO2 SERPL-SCNC: 30 MMOL/L (ref 23–29)
CREAT SERPL-MCNC: 4.8 MG/DL (ref 0.5–1.4)
CREAT SERPL-MCNC: 5.2 MG/DL (ref 0.5–1.4)
CV ECHO LV RWT: 0.39 CM
DELSYS: ABNORMAL
DIFFERENTIAL METHOD: ABNORMAL
DIFFERENTIAL METHOD: ABNORMAL
DOHLE BOD BLD QL SMEAR: PRESENT
DOP CALC AO PEAK VEL: 2.05 M/S
DOP CALC AO VTI: 28.75 CM
DOP CALC LVOT AREA: 2.9 CM2
DOP CALC LVOT DIAMETER: 1.92 CM
DOP CALC LVOT PEAK VEL: 1.1 M/S
DOP CALC LVOT STROKE VOLUME: 53.94 CM3
DOP CALCLVOT PEAK VEL VTI: 18.64 CM
E WAVE DECELERATION TIME: 162.17 MSEC
E/A RATIO: 0.74
E/E' RATIO: 16.6 M/S
ECHO LV POSTERIOR WALL: 0.97 CM (ref 0.6–1.1)
EJECTION FRACTION: 50 %
EOSINOPHIL # BLD AUTO: ABNORMAL K/UL (ref 0–0.5)
EOSINOPHIL NFR BLD: 0 % (ref 0–8)
EOSINOPHIL NFR BLD: 1 % (ref 0–8)
EOSINOPHIL URNS QL WRIGHT STN: ABNORMAL
ERYTHROCYTE [DISTWIDTH] IN BLOOD BY AUTOMATED COUNT: 11.7 % (ref 11.5–14.5)
ERYTHROCYTE [DISTWIDTH] IN BLOOD BY AUTOMATED COUNT: 11.9 % (ref 11.5–14.5)
ERYTHROCYTE [SEDIMENTATION RATE] IN BLOOD BY WESTERGREN METHOD: 20 MM/H
EST. GFR  (AFRICAN AMERICAN): 12 ML/MIN/1.73 M^2
EST. GFR  (AFRICAN AMERICAN): 14 ML/MIN/1.73 M^2
EST. GFR  (NON AFRICAN AMERICAN): 11 ML/MIN/1.73 M^2
EST. GFR  (NON AFRICAN AMERICAN): 12 ML/MIN/1.73 M^2
FIO2: 30
FRACTIONAL SHORTENING: 19 % (ref 28–44)
GLUCOSE SERPL-MCNC: 177 MG/DL (ref 70–110)
GLUCOSE SERPL-MCNC: 355 MG/DL (ref 70–110)
HAV IGM SERPL QL IA: NEGATIVE
HBV CORE IGM SERPL QL IA: NEGATIVE
HBV SURFACE AG SERPL QL IA: NEGATIVE
HCO3 UR-SCNC: 26.9 MMOL/L (ref 24–28)
HCT VFR BLD AUTO: 21.1 % (ref 40–54)
HCT VFR BLD AUTO: 22 % (ref 40–54)
HCV AB SERPL QL IA: NEGATIVE
HGB BLD-MCNC: 7.2 G/DL (ref 14–18)
HGB BLD-MCNC: 7.6 G/DL (ref 14–18)
HIV 1+2 AB+HIV1 P24 AG SERPL QL IA: NEGATIVE
IMM GRANULOCYTES # BLD AUTO: ABNORMAL K/UL (ref 0–0.04)
IMM GRANULOCYTES # BLD AUTO: ABNORMAL K/UL (ref 0–0.04)
IMM GRANULOCYTES NFR BLD AUTO: ABNORMAL % (ref 0–0.5)
IMM GRANULOCYTES NFR BLD AUTO: ABNORMAL % (ref 0–0.5)
INTERVENTRICULAR SEPTUM: 1.09 CM (ref 0.6–1.1)
IVRT: 131.3 MSEC
LA MAJOR: 6.36 CM
LA MINOR: 5.92 CM
LA WIDTH: 4.11 CM
LACTATE SERPL-SCNC: 1.5 MMOL/L (ref 0.5–2.2)
LACTATE SERPL-SCNC: 1.9 MMOL/L (ref 0.5–2.2)
LACTATE SERPL-SCNC: 2.3 MMOL/L (ref 0.5–2.2)
LACTATE SERPL-SCNC: 4 MMOL/L (ref 0.5–2.2)
LACTATE SERPL-SCNC: 4.1 MMOL/L (ref 0.5–2.2)
LACTATE SERPL-SCNC: 4.9 MMOL/L (ref 0.5–2.2)
LEFT ATRIUM SIZE: 3.96 CM
LEFT ATRIUM VOLUME INDEX: 45.1 ML/M2
LEFT ATRIUM VOLUME: 84.83 CM3
LEFT INTERNAL DIMENSION IN SYSTOLE: 3.97 CM (ref 2.1–4)
LEFT VENTRICLE DIASTOLIC VOLUME INDEX: 60.82 ML/M2
LEFT VENTRICLE DIASTOLIC VOLUME: 114.35 ML
LEFT VENTRICLE MASS INDEX: 98 G/M2
LEFT VENTRICLE SYSTOLIC VOLUME INDEX: 36.5 ML/M2
LEFT VENTRICLE SYSTOLIC VOLUME: 68.58 ML
LEFT VENTRICULAR INTERNAL DIMENSION IN DIASTOLE: 4.93 CM (ref 3.5–6)
LEFT VENTRICULAR MASS: 185.06 G
LV LATERAL E/E' RATIO: 16.6 M/S
LV SEPTAL E/E' RATIO: 16.6 M/S
LYMPHOCYTES # BLD AUTO: ABNORMAL K/UL (ref 1–4.8)
LYMPHOCYTES NFR BLD: 5 % (ref 18–48)
LYMPHOCYTES NFR BLD: 6 % (ref 18–48)
MCH RBC QN AUTO: 31 PG (ref 27–31)
MCH RBC QN AUTO: 31.2 PG (ref 27–31)
MCHC RBC AUTO-ENTMCNC: 34.1 G/DL (ref 32–36)
MCHC RBC AUTO-ENTMCNC: 34.5 G/DL (ref 32–36)
MCV RBC AUTO: 90 FL (ref 82–98)
MCV RBC AUTO: 91 FL (ref 82–98)
METAMYELOCYTES NFR BLD MANUAL: 3 %
METAMYELOCYTES NFR BLD MANUAL: 6 %
MIN VOL: 10
MODE: ABNORMAL
MONOCYTES # BLD AUTO: ABNORMAL K/UL (ref 0.3–1)
MONOCYTES NFR BLD: 1 % (ref 4–15)
MONOCYTES NFR BLD: 2 % (ref 4–15)
MV MEAN GRADIENT: 1 MMHG
MV PEAK A VEL: 1.12 M/S
MV PEAK E VEL: 0.83 M/S
MV PEAK GRADIENT: 7 MMHG
MV STENOSIS PRESSURE HALF TIME: 47.03 MS
MV VALVE AREA P 1/2 METHOD: 4.68 CM2
NEUTROPHILS NFR BLD: 52 % (ref 38–73)
NEUTROPHILS NFR BLD: 71 % (ref 38–73)
NEUTS BAND NFR BLD MANUAL: 18 %
NEUTS BAND NFR BLD MANUAL: 35 %
NRBC BLD-RTO: 0 /100 WBC
NRBC BLD-RTO: 0 /100 WBC
PCO2 BLDA: 35.7 MMHG (ref 35–45)
PEEP: 8
PH SMN: 7.48 [PH] (ref 7.35–7.45)
PIP: 24
PISA MRMAX VEL: 0.05 M/S
PISA TR MAX VEL: 2.88 M/S
PLATELET # BLD AUTO: 55 K/UL (ref 150–450)
PLATELET # BLD AUTO: 59 K/UL (ref 150–450)
PLATELET BLD QL SMEAR: ABNORMAL
PMV BLD AUTO: 13.9 FL (ref 9.2–12.9)
PMV BLD AUTO: 14.6 FL (ref 9.2–12.9)
PO2 BLDA: 103 MMHG (ref 80–100)
POC BE: 3 MMOL/L
POC SATURATED O2: 98 % (ref 95–100)
POC TCO2: 28 MMOL/L (ref 23–27)
POCT GLUCOSE: 192 MG/DL (ref 70–110)
POCT GLUCOSE: 204 MG/DL (ref 70–110)
POCT GLUCOSE: 277 MG/DL (ref 70–110)
POCT GLUCOSE: 326 MG/DL (ref 70–110)
POTASSIUM SERPL-SCNC: 3.8 MMOL/L (ref 3.5–5.1)
POTASSIUM SERPL-SCNC: 5.2 MMOL/L (ref 3.5–5.1)
PROCALCITONIN SERPL IA-MCNC: 392.46 NG/ML
PROT SERPL-MCNC: 5.9 G/DL (ref 6–8.4)
PULM VEIN S/D RATIO: 0.71
PV PEAK D VEL: 0.58 M/S
PV PEAK S VEL: 0.41 M/S
PV PEAK VELOCITY: 1.48 CM/S
RA MAJOR: 4.39 CM
RA PRESSURE: 3 MMHG
RA WIDTH: 4.56 CM
RBC # BLD AUTO: 2.31 M/UL (ref 4.6–6.2)
RBC # BLD AUTO: 2.45 M/UL (ref 4.6–6.2)
RIGHT VENTRICULAR END-DIASTOLIC DIMENSION: 3.98 CM
RV TISSUE DOPPLER FREE WALL SYSTOLIC VELOCITY 1 (APICAL 4 CHAMBER VIEW): 9.49 CM/S
SAMPLE: ABNORMAL
SITE: ABNORMAL
SODIUM SERPL-SCNC: 136 MMOL/L (ref 136–145)
SODIUM SERPL-SCNC: 137 MMOL/L (ref 136–145)
SP02: 100
STJ: 2.86 CM
TDI LATERAL: 0.05 M/S
TDI SEPTAL: 0.05 M/S
TDI: 0.05 M/S
TOXIC GRANULES BLD QL SMEAR: PRESENT
TR MAX PG: 33 MMHG
TRICUSPID ANNULAR PLANE SYSTOLIC EXCURSION: 1.74 CM
TV REST PULMONARY ARTERY PRESSURE: 36 MMHG
VANCOMYCIN SERPL-MCNC: 19.4 UG/ML
VT: 500
WBC # BLD AUTO: 10.7 K/UL (ref 3.9–12.7)
WBC # BLD AUTO: 8.77 K/UL (ref 3.9–12.7)

## 2021-06-14 PROCEDURE — 94003 VENT MGMT INPAT SUBQ DAY: CPT

## 2021-06-14 PROCEDURE — 99233 SBSQ HOSP IP/OBS HIGH 50: CPT | Mod: ,,, | Performed by: STUDENT IN AN ORGANIZED HEALTH CARE EDUCATION/TRAINING PROGRAM

## 2021-06-14 PROCEDURE — 94640 AIRWAY INHALATION TREATMENT: CPT

## 2021-06-14 PROCEDURE — 85007 BL SMEAR W/DIFF WBC COUNT: CPT | Performed by: UROLOGY

## 2021-06-14 PROCEDURE — 80053 COMPREHEN METABOLIC PANEL: CPT | Performed by: HOSPITALIST

## 2021-06-14 PROCEDURE — 36415 COLL VENOUS BLD VENIPUNCTURE: CPT | Performed by: UROLOGY

## 2021-06-14 PROCEDURE — 25000003 PHARM REV CODE 250: Performed by: HOSPITALIST

## 2021-06-14 PROCEDURE — C9399 UNCLASSIFIED DRUGS OR BIOLOG: HCPCS | Performed by: HOSPITALIST

## 2021-06-14 PROCEDURE — 25000003 PHARM REV CODE 250: Performed by: UROLOGY

## 2021-06-14 PROCEDURE — 94761 N-INVAS EAR/PLS OXIMETRY MLT: CPT

## 2021-06-14 PROCEDURE — 83605 ASSAY OF LACTIC ACID: CPT | Mod: 91 | Performed by: UROLOGY

## 2021-06-14 PROCEDURE — 99233 PR SUBSEQUENT HOSPITAL CARE,LEVL III: ICD-10-PCS | Mod: ,,, | Performed by: STUDENT IN AN ORGANIZED HEALTH CARE EDUCATION/TRAINING PROGRAM

## 2021-06-14 PROCEDURE — 82803 BLOOD GASES ANY COMBINATION: CPT

## 2021-06-14 PROCEDURE — 80048 BASIC METABOLIC PNL TOTAL CA: CPT | Performed by: INTERNAL MEDICINE

## 2021-06-14 PROCEDURE — 85007 BL SMEAR W/DIFF WBC COUNT: CPT | Mod: 91 | Performed by: INTERNAL MEDICINE

## 2021-06-14 PROCEDURE — 99900026 HC AIRWAY MAINTENANCE (STAT)

## 2021-06-14 PROCEDURE — 36415 COLL VENOUS BLD VENIPUNCTURE: CPT | Performed by: HOSPITALIST

## 2021-06-14 PROCEDURE — 99900035 HC TECH TIME PER 15 MIN (STAT)

## 2021-06-14 PROCEDURE — 94150 VITAL CAPACITY TEST: CPT

## 2021-06-14 PROCEDURE — A4217 STERILE WATER/SALINE, 500 ML: HCPCS | Performed by: INTERNAL MEDICINE

## 2021-06-14 PROCEDURE — 63600175 PHARM REV CODE 636 W HCPCS: Performed by: HOSPITALIST

## 2021-06-14 PROCEDURE — 85027 COMPLETE CBC AUTOMATED: CPT | Mod: 91 | Performed by: INTERNAL MEDICINE

## 2021-06-14 PROCEDURE — 85027 COMPLETE CBC AUTOMATED: CPT | Performed by: UROLOGY

## 2021-06-14 PROCEDURE — 63600175 PHARM REV CODE 636 W HCPCS: Performed by: UROLOGY

## 2021-06-14 PROCEDURE — 20000000 HC ICU ROOM

## 2021-06-14 PROCEDURE — 25000003 PHARM REV CODE 250: Performed by: INTERNAL MEDICINE

## 2021-06-14 PROCEDURE — 36600 WITHDRAWAL OF ARTERIAL BLOOD: CPT

## 2021-06-14 PROCEDURE — 63600175 PHARM REV CODE 636 W HCPCS: Performed by: INTERNAL MEDICINE

## 2021-06-14 PROCEDURE — 25000242 PHARM REV CODE 250 ALT 637 W/ HCPCS: Performed by: UROLOGY

## 2021-06-14 PROCEDURE — 80202 ASSAY OF VANCOMYCIN: CPT | Performed by: UROLOGY

## 2021-06-14 PROCEDURE — 27000221 HC OXYGEN, UP TO 24 HOURS

## 2021-06-14 PROCEDURE — 84145 PROCALCITONIN (PCT): CPT | Performed by: INTERNAL MEDICINE

## 2021-06-14 PROCEDURE — 87040 BLOOD CULTURE FOR BACTERIA: CPT | Performed by: INTERNAL MEDICINE

## 2021-06-14 RX ORDER — ACETAMINOPHEN 325 MG/1
650 TABLET ORAL EVERY 4 HOURS PRN
Status: DISCONTINUED | OUTPATIENT
Start: 2021-06-14 | End: 2021-06-19 | Stop reason: HOSPADM

## 2021-06-14 RX ORDER — ONDANSETRON 2 MG/ML
8 INJECTION INTRAMUSCULAR; INTRAVENOUS EVERY 6 HOURS PRN
Status: DISCONTINUED | OUTPATIENT
Start: 2021-06-14 | End: 2021-06-19 | Stop reason: HOSPADM

## 2021-06-14 RX ORDER — POLYETHYLENE GLYCOL 3350 17 G/17G
17 POWDER, FOR SOLUTION ORAL 2 TIMES DAILY PRN
Status: DISCONTINUED | OUTPATIENT
Start: 2021-06-14 | End: 2021-06-19 | Stop reason: HOSPADM

## 2021-06-14 RX ADMIN — CARVEDILOL 3.12 MG: 3.12 TABLET, FILM COATED ORAL at 08:06

## 2021-06-14 RX ADMIN — DOCUSATE SODIUM 50 MG AND SENNOSIDES 8.6 MG 1 TABLET: 8.6; 5 TABLET, FILM COATED ORAL at 08:06

## 2021-06-14 RX ADMIN — ASPIRIN 81 MG CHEWABLE TABLET 81 MG: 81 TABLET CHEWABLE at 08:06

## 2021-06-14 RX ADMIN — FAMOTIDINE 20 MG: 10 INJECTION INTRAVENOUS at 08:06

## 2021-06-14 RX ADMIN — INSULIN DETEMIR 10 UNITS: 100 INJECTION, SOLUTION SUBCUTANEOUS at 09:06

## 2021-06-14 RX ADMIN — IPRATROPIUM BROMIDE AND ALBUTEROL SULFATE 3 ML: .5; 3 SOLUTION RESPIRATORY (INHALATION) at 07:06

## 2021-06-14 RX ADMIN — ACETAMINOPHEN 650 MG: 325 TABLET ORAL at 04:06

## 2021-06-14 RX ADMIN — SODIUM CHLORIDE, SODIUM LACTATE, POTASSIUM CHLORIDE, AND CALCIUM CHLORIDE 500 ML: .6; .31; .03; .02 INJECTION, SOLUTION INTRAVENOUS at 09:06

## 2021-06-14 RX ADMIN — VANCOMYCIN HYDROCHLORIDE 500 MG: 500 INJECTION, POWDER, LYOPHILIZED, FOR SOLUTION INTRAVENOUS at 06:06

## 2021-06-14 RX ADMIN — HEPARIN SODIUM 5000 UNITS: 5000 INJECTION INTRAVENOUS; SUBCUTANEOUS at 02:06

## 2021-06-14 RX ADMIN — HEPARIN SODIUM 5000 UNITS: 5000 INJECTION INTRAVENOUS; SUBCUTANEOUS at 06:06

## 2021-06-14 RX ADMIN — SODIUM BICARBONATE: 84 INJECTION, SOLUTION INTRAVENOUS at 09:06

## 2021-06-14 RX ADMIN — SODIUM BICARBONATE: 84 INJECTION, SOLUTION INTRAVENOUS at 08:06

## 2021-06-14 RX ADMIN — INSULIN ASPART 2 UNITS: 100 INJECTION, SOLUTION INTRAVENOUS; SUBCUTANEOUS at 11:06

## 2021-06-14 RX ADMIN — HEPARIN SODIUM 5000 UNITS: 5000 INJECTION INTRAVENOUS; SUBCUTANEOUS at 09:06

## 2021-06-14 RX ADMIN — MEROPENEM AND SODIUM CHLORIDE 500 MG: 500 INJECTION, SOLUTION INTRAVENOUS at 08:06

## 2021-06-14 RX ADMIN — MUPIROCIN: 20 OINTMENT TOPICAL at 08:06

## 2021-06-14 RX ADMIN — ACETAMINOPHEN 650 MG: 325 TABLET ORAL at 11:06

## 2021-06-14 RX ADMIN — INSULIN ASPART 1 UNITS: 100 INJECTION, SOLUTION INTRAVENOUS; SUBCUTANEOUS at 09:06

## 2021-06-14 RX ADMIN — INSULIN ASPART 4 UNITS: 100 INJECTION, SOLUTION INTRAVENOUS; SUBCUTANEOUS at 06:06

## 2021-06-14 RX ADMIN — 0.12% CHLORHEXIDINE GLUCONATE 15 ML: 1.2 RINSE ORAL at 08:06

## 2021-06-14 RX ADMIN — MEROPENEM AND SODIUM CHLORIDE 500 MG: 500 INJECTION, SOLUTION INTRAVENOUS at 07:06

## 2021-06-14 RX ADMIN — IPRATROPIUM BROMIDE AND ALBUTEROL SULFATE 3 ML: .5; 3 SOLUTION RESPIRATORY (INHALATION) at 12:06

## 2021-06-14 RX ADMIN — IPRATROPIUM BROMIDE AND ALBUTEROL SULFATE 3 ML: .5; 3 SOLUTION RESPIRATORY (INHALATION) at 01:06

## 2021-06-15 LAB
ABO + RH BLD: NORMAL
ALBUMIN SERPL BCP-MCNC: 2 G/DL (ref 3.5–5.2)
ALP SERPL-CCNC: 111 U/L (ref 55–135)
ALT SERPL W/O P-5'-P-CCNC: 43 U/L (ref 10–44)
ANION GAP SERPL CALC-SCNC: 9 MMOL/L (ref 8–16)
ANISOCYTOSIS BLD QL SMEAR: SLIGHT
AST SERPL-CCNC: 25 U/L (ref 10–40)
BACTERIA BLD CULT: ABNORMAL
BACTERIA SPEC AEROBE CULT: NORMAL
BACTERIA UR CULT: NORMAL
BASOPHILS NFR BLD: 0 % (ref 0–1.9)
BILIRUB SERPL-MCNC: 1.3 MG/DL (ref 0.1–1)
BLD GP AB SCN CELLS X3 SERPL QL: NORMAL
BLD PROD TYP BPU: NORMAL
BLOOD UNIT EXPIRATION DATE: NORMAL
BLOOD UNIT TYPE CODE: 5100
BLOOD UNIT TYPE: NORMAL
BUN SERPL-MCNC: 52 MG/DL (ref 8–23)
CALCIUM SERPL-MCNC: 7.9 MG/DL (ref 8.7–10.5)
CHLORIDE SERPL-SCNC: 99 MMOL/L (ref 95–110)
CO2 SERPL-SCNC: 29 MMOL/L (ref 23–29)
CODING SYSTEM: NORMAL
CREAT SERPL-MCNC: 4.4 MG/DL (ref 0.5–1.4)
DIFFERENTIAL METHOD: ABNORMAL
DISPENSE STATUS: NORMAL
EOSINOPHIL NFR BLD: 1 % (ref 0–8)
ERYTHROCYTE [DISTWIDTH] IN BLOOD BY AUTOMATED COUNT: 12 % (ref 11.5–14.5)
EST. GFR  (AFRICAN AMERICAN): 15 ML/MIN/1.73 M^2
EST. GFR  (NON AFRICAN AMERICAN): 13 ML/MIN/1.73 M^2
GLUCOSE SERPL-MCNC: 195 MG/DL (ref 70–110)
GRAM STN SPEC: NORMAL
HCT VFR BLD AUTO: 20.2 % (ref 40–54)
HGB BLD-MCNC: 6.7 G/DL (ref 14–18)
HGB BLD-MCNC: 7 G/DL (ref 14–18)
IMM GRANULOCYTES # BLD AUTO: ABNORMAL K/UL (ref 0–0.04)
IMM GRANULOCYTES NFR BLD AUTO: ABNORMAL % (ref 0–0.5)
LACTATE SERPL-SCNC: 1.4 MMOL/L (ref 0.5–2.2)
LACTATE SERPL-SCNC: 1.5 MMOL/L (ref 0.5–2.2)
LYMPHOCYTES NFR BLD: 11 % (ref 18–48)
MCH RBC QN AUTO: 31.1 PG (ref 27–31)
MCHC RBC AUTO-ENTMCNC: 34.7 G/DL (ref 32–36)
MCV RBC AUTO: 90 FL (ref 82–98)
METAMYELOCYTES NFR BLD MANUAL: 2 %
MONOCYTES NFR BLD: 3 % (ref 4–15)
NEUTROPHILS NFR BLD: 69 % (ref 38–73)
NEUTS BAND NFR BLD MANUAL: 14 %
NRBC BLD-RTO: 0 /100 WBC
NUM UNITS TRANS PACKED RBC: NORMAL
PLATELET # BLD AUTO: 63 K/UL (ref 150–450)
PLATELET BLD QL SMEAR: ABNORMAL
PMV BLD AUTO: 13.5 FL (ref 9.2–12.9)
POCT GLUCOSE: 181 MG/DL (ref 70–110)
POCT GLUCOSE: 274 MG/DL (ref 70–110)
POCT GLUCOSE: 290 MG/DL (ref 70–110)
POTASSIUM SERPL-SCNC: 3.5 MMOL/L (ref 3.5–5.1)
PROT SERPL-MCNC: 5.7 G/DL (ref 6–8.4)
RBC # BLD AUTO: 2.25 M/UL (ref 4.6–6.2)
SODIUM SERPL-SCNC: 137 MMOL/L (ref 136–145)
STOMATOCYTES BLD QL SMEAR: PRESENT
TOXIC GRANULES BLD QL SMEAR: ABNORMAL
VANCOMYCIN SERPL-MCNC: 13.4 UG/ML
WBC # BLD AUTO: 9.98 K/UL (ref 3.9–12.7)

## 2021-06-15 PROCEDURE — 80202 ASSAY OF VANCOMYCIN: CPT | Performed by: UROLOGY

## 2021-06-15 PROCEDURE — 36415 COLL VENOUS BLD VENIPUNCTURE: CPT | Performed by: UROLOGY

## 2021-06-15 PROCEDURE — 80053 COMPREHEN METABOLIC PANEL: CPT | Performed by: HOSPITALIST

## 2021-06-15 PROCEDURE — 25000003 PHARM REV CODE 250: Performed by: UROLOGY

## 2021-06-15 PROCEDURE — 86920 COMPATIBILITY TEST SPIN: CPT | Performed by: INTERNAL MEDICINE

## 2021-06-15 PROCEDURE — 85007 BL SMEAR W/DIFF WBC COUNT: CPT | Performed by: UROLOGY

## 2021-06-15 PROCEDURE — 36415 COLL VENOUS BLD VENIPUNCTURE: CPT | Performed by: HOSPITALIST

## 2021-06-15 PROCEDURE — 83605 ASSAY OF LACTIC ACID: CPT | Mod: 91 | Performed by: UROLOGY

## 2021-06-15 PROCEDURE — 99232 PR SUBSEQUENT HOSPITAL CARE,LEVL II: ICD-10-PCS | Mod: ,,, | Performed by: UROLOGY

## 2021-06-15 PROCEDURE — 25000003 PHARM REV CODE 250: Performed by: INTERNAL MEDICINE

## 2021-06-15 PROCEDURE — 25000003 PHARM REV CODE 250: Performed by: HOSPITALIST

## 2021-06-15 PROCEDURE — 94640 AIRWAY INHALATION TREATMENT: CPT

## 2021-06-15 PROCEDURE — 86900 BLOOD TYPING SEROLOGIC ABO: CPT | Performed by: INTERNAL MEDICINE

## 2021-06-15 PROCEDURE — 85027 COMPLETE CBC AUTOMATED: CPT | Performed by: UROLOGY

## 2021-06-15 PROCEDURE — 36415 COLL VENOUS BLD VENIPUNCTURE: CPT | Performed by: INTERNAL MEDICINE

## 2021-06-15 PROCEDURE — P9016 RBC LEUKOCYTES REDUCED: HCPCS | Performed by: INTERNAL MEDICINE

## 2021-06-15 PROCEDURE — 20000000 HC ICU ROOM

## 2021-06-15 PROCEDURE — A4217 STERILE WATER/SALINE, 500 ML: HCPCS | Performed by: INTERNAL MEDICINE

## 2021-06-15 PROCEDURE — 87186 SC STD MICRODIL/AGAR DIL: CPT | Performed by: INTERNAL MEDICINE

## 2021-06-15 PROCEDURE — 36430 TRANSFUSION BLD/BLD COMPNT: CPT

## 2021-06-15 PROCEDURE — 25000242 PHARM REV CODE 250 ALT 637 W/ HCPCS: Performed by: UROLOGY

## 2021-06-15 PROCEDURE — 87077 CULTURE AEROBIC IDENTIFY: CPT | Performed by: INTERNAL MEDICINE

## 2021-06-15 PROCEDURE — 99232 SBSQ HOSP IP/OBS MODERATE 35: CPT | Mod: ,,, | Performed by: UROLOGY

## 2021-06-15 PROCEDURE — 87040 BLOOD CULTURE FOR BACTERIA: CPT | Performed by: INTERNAL MEDICINE

## 2021-06-15 PROCEDURE — 63600175 PHARM REV CODE 636 W HCPCS: Performed by: UROLOGY

## 2021-06-15 PROCEDURE — 85018 HEMOGLOBIN: CPT | Performed by: INTERNAL MEDICINE

## 2021-06-15 PROCEDURE — 94640 AIRWAY INHALATION TREATMENT: CPT | Mod: ER

## 2021-06-15 PROCEDURE — 63600175 PHARM REV CODE 636 W HCPCS: Performed by: INTERNAL MEDICINE

## 2021-06-15 PROCEDURE — 94761 N-INVAS EAR/PLS OXIMETRY MLT: CPT

## 2021-06-15 RX ORDER — HYDROCODONE BITARTRATE AND ACETAMINOPHEN 500; 5 MG/1; MG/1
TABLET ORAL
Status: DISCONTINUED | OUTPATIENT
Start: 2021-06-15 | End: 2021-06-19 | Stop reason: HOSPADM

## 2021-06-15 RX ORDER — SODIUM CHLORIDE 9 MG/ML
INJECTION, SOLUTION INTRAVENOUS CONTINUOUS
Status: DISCONTINUED | OUTPATIENT
Start: 2021-06-15 | End: 2021-06-16

## 2021-06-15 RX ORDER — SODIUM CHLORIDE 9 MG/ML
INJECTION, SOLUTION INTRAVENOUS
Status: DISCONTINUED | OUTPATIENT
Start: 2021-06-15 | End: 2021-06-19 | Stop reason: HOSPADM

## 2021-06-15 RX ADMIN — INSULIN ASPART 4 UNITS: 100 INJECTION, SOLUTION INTRAVENOUS; SUBCUTANEOUS at 04:06

## 2021-06-15 RX ADMIN — MUPIROCIN: 20 OINTMENT TOPICAL at 08:06

## 2021-06-15 RX ADMIN — IPRATROPIUM BROMIDE AND ALBUTEROL SULFATE 3 ML: .5; 3 SOLUTION RESPIRATORY (INHALATION) at 02:06

## 2021-06-15 RX ADMIN — CARVEDILOL 3.12 MG: 3.12 TABLET, FILM COATED ORAL at 09:06

## 2021-06-15 RX ADMIN — SODIUM BICARBONATE: 84 INJECTION, SOLUTION INTRAVENOUS at 06:06

## 2021-06-15 RX ADMIN — MEROPENEM AND SODIUM CHLORIDE 500 MG: 500 INJECTION, SOLUTION INTRAVENOUS at 08:06

## 2021-06-15 RX ADMIN — INSULIN ASPART 3 UNITS: 100 INJECTION, SOLUTION INTRAVENOUS; SUBCUTANEOUS at 08:06

## 2021-06-15 RX ADMIN — INSULIN ASPART 3 UNITS: 100 INJECTION, SOLUTION INTRAVENOUS; SUBCUTANEOUS at 12:06

## 2021-06-15 RX ADMIN — IPRATROPIUM BROMIDE AND ALBUTEROL SULFATE 3 ML: .5; 3 SOLUTION RESPIRATORY (INHALATION) at 08:06

## 2021-06-15 RX ADMIN — IPRATROPIUM BROMIDE AND ALBUTEROL SULFATE 3 ML: .5; 3 SOLUTION RESPIRATORY (INHALATION) at 12:06

## 2021-06-15 RX ADMIN — SODIUM CHLORIDE: 0.9 INJECTION, SOLUTION INTRAVENOUS at 12:06

## 2021-06-15 RX ADMIN — ASPIRIN 81 MG CHEWABLE TABLET 81 MG: 81 TABLET CHEWABLE at 09:06

## 2021-06-15 RX ADMIN — FAMOTIDINE 20 MG: 10 INJECTION INTRAVENOUS at 09:06

## 2021-06-15 RX ADMIN — HEPARIN SODIUM 5000 UNITS: 5000 INJECTION INTRAVENOUS; SUBCUTANEOUS at 09:06

## 2021-06-15 RX ADMIN — IPRATROPIUM BROMIDE AND ALBUTEROL SULFATE 3 ML: .5; 3 SOLUTION RESPIRATORY (INHALATION) at 07:06

## 2021-06-15 RX ADMIN — VANCOMYCIN HYDROCHLORIDE 750 MG: 750 INJECTION, POWDER, LYOPHILIZED, FOR SOLUTION INTRAVENOUS at 06:06

## 2021-06-15 RX ADMIN — CARVEDILOL 3.12 MG: 3.12 TABLET, FILM COATED ORAL at 08:06

## 2021-06-15 RX ADMIN — DOCUSATE SODIUM 50 MG AND SENNOSIDES 8.6 MG 1 TABLET: 8.6; 5 TABLET, FILM COATED ORAL at 09:06

## 2021-06-15 RX ADMIN — MUPIROCIN: 20 OINTMENT TOPICAL at 09:06

## 2021-06-15 RX ADMIN — INSULIN DETEMIR 10 UNITS: 100 INJECTION, SOLUTION SUBCUTANEOUS at 08:06

## 2021-06-15 RX ADMIN — HEPARIN SODIUM 5000 UNITS: 5000 INJECTION INTRAVENOUS; SUBCUTANEOUS at 06:06

## 2021-06-15 RX ADMIN — HEPARIN SODIUM 5000 UNITS: 5000 INJECTION INTRAVENOUS; SUBCUTANEOUS at 03:06

## 2021-06-15 RX ADMIN — DOCUSATE SODIUM 50 MG AND SENNOSIDES 8.6 MG 1 TABLET: 8.6; 5 TABLET, FILM COATED ORAL at 08:06

## 2021-06-15 RX ADMIN — SODIUM CHLORIDE 5 ML/HR: 0.9 INJECTION, SOLUTION INTRAVENOUS at 09:06

## 2021-06-16 PROBLEM — R78.81 GRAM-NEGATIVE BACTEREMIA: Status: ACTIVE | Noted: 2021-06-16

## 2021-06-16 PROBLEM — N18.4 ACUTE RENAL FAILURE SUPERIMPOSED ON STAGE 4 CHRONIC KIDNEY DISEASE: Status: ACTIVE | Noted: 2021-05-01

## 2021-06-16 LAB
ANION GAP SERPL CALC-SCNC: 9 MMOL/L (ref 8–16)
ANISOCYTOSIS BLD QL SMEAR: SLIGHT
BASOPHILS NFR BLD: 2 % (ref 0–1.9)
BLD PROD TYP BPU: NORMAL
BLOOD UNIT EXPIRATION DATE: NORMAL
BLOOD UNIT TYPE CODE: 5100
BLOOD UNIT TYPE: NORMAL
BUN SERPL-MCNC: 50 MG/DL (ref 8–23)
CALCIUM SERPL-MCNC: 8.4 MG/DL (ref 8.7–10.5)
CHLORIDE SERPL-SCNC: 102 MMOL/L (ref 95–110)
CO2 SERPL-SCNC: 27 MMOL/L (ref 23–29)
CODING SYSTEM: NORMAL
CREAT SERPL-MCNC: 3.6 MG/DL (ref 0.5–1.4)
DIFFERENTIAL METHOD: ABNORMAL
DISPENSE STATUS: NORMAL
DOHLE BOD BLD QL SMEAR: PRESENT
EOSINOPHIL NFR BLD: 4 % (ref 0–8)
ERYTHROCYTE [DISTWIDTH] IN BLOOD BY AUTOMATED COUNT: 13.1 % (ref 11.5–14.5)
EST. GFR  (AFRICAN AMERICAN): 19 ML/MIN/1.73 M^2
EST. GFR  (NON AFRICAN AMERICAN): 17 ML/MIN/1.73 M^2
GLUCOSE SERPL-MCNC: 139 MG/DL (ref 70–110)
HCT VFR BLD AUTO: 19.5 % (ref 40–54)
HGB BLD-MCNC: 6.7 G/DL (ref 14–18)
HYPOCHROMIA BLD QL SMEAR: ABNORMAL
IMM GRANULOCYTES # BLD AUTO: ABNORMAL K/UL (ref 0–0.04)
IMM GRANULOCYTES NFR BLD AUTO: ABNORMAL % (ref 0–0.5)
LYMPHOCYTES NFR BLD: 15 % (ref 18–48)
MCH RBC QN AUTO: 31.2 PG (ref 27–31)
MCHC RBC AUTO-ENTMCNC: 34.4 G/DL (ref 32–36)
MCV RBC AUTO: 91 FL (ref 82–98)
METAMYELOCYTES NFR BLD MANUAL: 1 %
MONOCYTES NFR BLD: 8 % (ref 4–15)
NEUTROPHILS NFR BLD: 62 % (ref 38–73)
NEUTS BAND NFR BLD MANUAL: 8 %
NRBC BLD-RTO: 0 /100 WBC
NUM UNITS TRANS PACKED RBC: NORMAL
PLATELET # BLD AUTO: 80 K/UL (ref 150–450)
PLATELET BLD QL SMEAR: ABNORMAL
PMV BLD AUTO: 14.3 FL (ref 9.2–12.9)
POCT GLUCOSE: 159 MG/DL (ref 70–110)
POCT GLUCOSE: 289 MG/DL (ref 70–110)
POCT GLUCOSE: 310 MG/DL (ref 70–110)
POCT GLUCOSE: 350 MG/DL (ref 70–110)
POCT GLUCOSE: 357 MG/DL (ref 70–110)
POIKILOCYTOSIS BLD QL SMEAR: SLIGHT
POTASSIUM SERPL-SCNC: 3.6 MMOL/L (ref 3.5–5.1)
RBC # BLD AUTO: 2.15 M/UL (ref 4.6–6.2)
SODIUM SERPL-SCNC: 138 MMOL/L (ref 136–145)
STOMATOCYTES BLD QL SMEAR: PRESENT
TOXIC GRANULES BLD QL SMEAR: PRESENT
VANCOMYCIN SERPL-MCNC: 14.6 UG/ML
WBC # BLD AUTO: 9.44 K/UL (ref 3.9–12.7)

## 2021-06-16 PROCEDURE — 94761 N-INVAS EAR/PLS OXIMETRY MLT: CPT

## 2021-06-16 PROCEDURE — 36430 TRANSFUSION BLD/BLD COMPNT: CPT

## 2021-06-16 PROCEDURE — 25000242 PHARM REV CODE 250 ALT 637 W/ HCPCS: Performed by: UROLOGY

## 2021-06-16 PROCEDURE — 80202 ASSAY OF VANCOMYCIN: CPT | Performed by: UROLOGY

## 2021-06-16 PROCEDURE — 20000000 HC ICU ROOM

## 2021-06-16 PROCEDURE — P9016 RBC LEUKOCYTES REDUCED: HCPCS | Performed by: INTERNAL MEDICINE

## 2021-06-16 PROCEDURE — 94640 AIRWAY INHALATION TREATMENT: CPT

## 2021-06-16 PROCEDURE — 36415 COLL VENOUS BLD VENIPUNCTURE: CPT | Performed by: UROLOGY

## 2021-06-16 PROCEDURE — 99232 PR SUBSEQUENT HOSPITAL CARE,LEVL II: ICD-10-PCS | Mod: ,,, | Performed by: UROLOGY

## 2021-06-16 PROCEDURE — 63600175 PHARM REV CODE 636 W HCPCS: Performed by: UROLOGY

## 2021-06-16 PROCEDURE — 80048 BASIC METABOLIC PNL TOTAL CA: CPT | Performed by: INTERNAL MEDICINE

## 2021-06-16 PROCEDURE — 99232 SBSQ HOSP IP/OBS MODERATE 35: CPT | Mod: ,,, | Performed by: UROLOGY

## 2021-06-16 PROCEDURE — 85007 BL SMEAR W/DIFF WBC COUNT: CPT | Performed by: UROLOGY

## 2021-06-16 PROCEDURE — 25000003 PHARM REV CODE 250: Performed by: INTERNAL MEDICINE

## 2021-06-16 PROCEDURE — 85027 COMPLETE CBC AUTOMATED: CPT | Performed by: UROLOGY

## 2021-06-16 PROCEDURE — 25000003 PHARM REV CODE 250: Performed by: HOSPITALIST

## 2021-06-16 PROCEDURE — 27000221 HC OXYGEN, UP TO 24 HOURS

## 2021-06-16 PROCEDURE — 25000003 PHARM REV CODE 250: Performed by: UROLOGY

## 2021-06-16 PROCEDURE — 63600175 PHARM REV CODE 636 W HCPCS: Performed by: INTERNAL MEDICINE

## 2021-06-16 RX ORDER — HYDROCODONE BITARTRATE AND ACETAMINOPHEN 500; 5 MG/1; MG/1
TABLET ORAL
Status: DISCONTINUED | OUTPATIENT
Start: 2021-06-16 | End: 2021-06-19 | Stop reason: HOSPADM

## 2021-06-16 RX ORDER — SODIUM CHLORIDE 9 MG/ML
INJECTION, SOLUTION INTRAVENOUS CONTINUOUS
Status: DISCONTINUED | OUTPATIENT
Start: 2021-06-16 | End: 2021-06-16

## 2021-06-16 RX ORDER — IPRATROPIUM BROMIDE AND ALBUTEROL SULFATE 2.5; .5 MG/3ML; MG/3ML
3 SOLUTION RESPIRATORY (INHALATION) EVERY 6 HOURS PRN
Status: DISCONTINUED | OUTPATIENT
Start: 2021-06-16 | End: 2021-06-19 | Stop reason: HOSPADM

## 2021-06-16 RX ORDER — CIPROFLOXACIN 2 MG/ML
400 INJECTION, SOLUTION INTRAVENOUS
Status: DISCONTINUED | OUTPATIENT
Start: 2021-06-16 | End: 2021-06-19 | Stop reason: HOSPADM

## 2021-06-16 RX ORDER — GUAIFENESIN 100 MG/5ML
200 SOLUTION ORAL EVERY 4 HOURS PRN
Status: DISCONTINUED | OUTPATIENT
Start: 2021-06-16 | End: 2021-06-19 | Stop reason: HOSPADM

## 2021-06-16 RX ADMIN — ASPIRIN 81 MG CHEWABLE TABLET 81 MG: 81 TABLET CHEWABLE at 09:06

## 2021-06-16 RX ADMIN — CARVEDILOL 3.12 MG: 3.12 TABLET, FILM COATED ORAL at 09:06

## 2021-06-16 RX ADMIN — INSULIN ASPART 1 UNITS: 100 INJECTION, SOLUTION INTRAVENOUS; SUBCUTANEOUS at 09:06

## 2021-06-16 RX ADMIN — IPRATROPIUM BROMIDE AND ALBUTEROL SULFATE 3 ML: .5; 3 SOLUTION RESPIRATORY (INHALATION) at 12:06

## 2021-06-16 RX ADMIN — HEPARIN SODIUM 5000 UNITS: 5000 INJECTION INTRAVENOUS; SUBCUTANEOUS at 05:06

## 2021-06-16 RX ADMIN — INSULIN DETEMIR 10 UNITS: 100 INJECTION, SOLUTION SUBCUTANEOUS at 09:06

## 2021-06-16 RX ADMIN — MUPIROCIN: 20 OINTMENT TOPICAL at 09:06

## 2021-06-16 RX ADMIN — MEROPENEM AND SODIUM CHLORIDE 500 MG: 500 INJECTION, SOLUTION INTRAVENOUS at 07:06

## 2021-06-16 RX ADMIN — DOCUSATE SODIUM 50 MG AND SENNOSIDES 8.6 MG 1 TABLET: 8.6; 5 TABLET, FILM COATED ORAL at 09:06

## 2021-06-16 RX ADMIN — CIPROFLOXACIN 400 MG: 2 INJECTION, SOLUTION INTRAVENOUS at 11:06

## 2021-06-16 RX ADMIN — GUAIFENESIN 200 MG: 200 SOLUTION ORAL at 12:06

## 2021-06-16 RX ADMIN — HEPARIN SODIUM 5000 UNITS: 5000 INJECTION INTRAVENOUS; SUBCUTANEOUS at 09:06

## 2021-06-16 RX ADMIN — SODIUM CHLORIDE: 0.9 INJECTION, SOLUTION INTRAVENOUS at 09:06

## 2021-06-16 RX ADMIN — INSULIN ASPART 4 UNITS: 100 INJECTION, SOLUTION INTRAVENOUS; SUBCUTANEOUS at 11:06

## 2021-06-16 RX ADMIN — IPRATROPIUM BROMIDE AND ALBUTEROL SULFATE 3 ML: .5; 3 SOLUTION RESPIRATORY (INHALATION) at 08:06

## 2021-06-16 RX ADMIN — FAMOTIDINE 20 MG: 10 INJECTION INTRAVENOUS at 09:06

## 2021-06-16 RX ADMIN — INSULIN ASPART 5 UNITS: 100 INJECTION, SOLUTION INTRAVENOUS; SUBCUTANEOUS at 04:06

## 2021-06-16 RX ADMIN — CIPROFLOXACIN 400 MG: 2 INJECTION, SOLUTION INTRAVENOUS at 09:06

## 2021-06-16 RX ADMIN — SODIUM CHLORIDE: 0.9 INJECTION, SOLUTION INTRAVENOUS at 01:06

## 2021-06-17 LAB
BASOPHILS # BLD AUTO: ABNORMAL K/UL (ref 0–0.2)
BASOPHILS NFR BLD: 0 % (ref 0–1.9)
DIFFERENTIAL METHOD: ABNORMAL
EOSINOPHIL # BLD AUTO: ABNORMAL K/UL (ref 0–0.5)
EOSINOPHIL NFR BLD: 6 % (ref 0–8)
ERYTHROCYTE [DISTWIDTH] IN BLOOD BY AUTOMATED COUNT: 13.3 % (ref 11.5–14.5)
HCT VFR BLD AUTO: 26.2 % (ref 40–54)
HGB BLD-MCNC: 9 G/DL (ref 14–18)
HYPOCHROMIA BLD QL SMEAR: ABNORMAL
IMM GRANULOCYTES # BLD AUTO: ABNORMAL K/UL (ref 0–0.04)
IMM GRANULOCYTES NFR BLD AUTO: ABNORMAL % (ref 0–0.5)
LYMPHOCYTES # BLD AUTO: ABNORMAL K/UL (ref 1–4.8)
LYMPHOCYTES NFR BLD: 19 % (ref 18–48)
MCH RBC QN AUTO: 30.5 PG (ref 27–31)
MCHC RBC AUTO-ENTMCNC: 34.4 G/DL (ref 32–36)
MCV RBC AUTO: 89 FL (ref 82–98)
MONOCYTES # BLD AUTO: ABNORMAL K/UL (ref 0.3–1)
MONOCYTES NFR BLD: 8 % (ref 4–15)
NEUTROPHILS NFR BLD: 64 % (ref 38–73)
NEUTS BAND NFR BLD MANUAL: 3 %
NRBC BLD-RTO: 0 /100 WBC
OVALOCYTES BLD QL SMEAR: ABNORMAL
PLATELET # BLD AUTO: 99 K/UL (ref 150–450)
PLATELET BLD QL SMEAR: ABNORMAL
PMV BLD AUTO: 13.1 FL (ref 9.2–12.9)
POCT GLUCOSE: 250 MG/DL (ref 70–110)
POCT GLUCOSE: 305 MG/DL (ref 70–110)
POCT GLUCOSE: 307 MG/DL (ref 70–110)
RBC # BLD AUTO: 2.95 M/UL (ref 4.6–6.2)
STOMATOCYTES BLD QL SMEAR: PRESENT
WBC # BLD AUTO: 9.8 K/UL (ref 3.9–12.7)

## 2021-06-17 PROCEDURE — 25000003 PHARM REV CODE 250: Performed by: UROLOGY

## 2021-06-17 PROCEDURE — 99900035 HC TECH TIME PER 15 MIN (STAT)

## 2021-06-17 PROCEDURE — 25000003 PHARM REV CODE 250: Performed by: INTERNAL MEDICINE

## 2021-06-17 PROCEDURE — 25000003 PHARM REV CODE 250: Performed by: HOSPITALIST

## 2021-06-17 PROCEDURE — 63600175 PHARM REV CODE 636 W HCPCS: Performed by: UROLOGY

## 2021-06-17 PROCEDURE — 63600175 PHARM REV CODE 636 W HCPCS: Performed by: INTERNAL MEDICINE

## 2021-06-17 PROCEDURE — 99232 SBSQ HOSP IP/OBS MODERATE 35: CPT | Mod: ,,, | Performed by: UROLOGY

## 2021-06-17 PROCEDURE — 21400001 HC TELEMETRY ROOM

## 2021-06-17 PROCEDURE — 94761 N-INVAS EAR/PLS OXIMETRY MLT: CPT

## 2021-06-17 PROCEDURE — 36415 COLL VENOUS BLD VENIPUNCTURE: CPT | Performed by: UROLOGY

## 2021-06-17 PROCEDURE — 85027 COMPLETE CBC AUTOMATED: CPT | Performed by: UROLOGY

## 2021-06-17 PROCEDURE — 27000221 HC OXYGEN, UP TO 24 HOURS

## 2021-06-17 PROCEDURE — 99232 PR SUBSEQUENT HOSPITAL CARE,LEVL II: ICD-10-PCS | Mod: ,,, | Performed by: UROLOGY

## 2021-06-17 PROCEDURE — 85007 BL SMEAR W/DIFF WBC COUNT: CPT | Performed by: UROLOGY

## 2021-06-17 RX ORDER — HYDRALAZINE HYDROCHLORIDE 25 MG/1
25 TABLET, FILM COATED ORAL EVERY 8 HOURS
Status: DISCONTINUED | OUTPATIENT
Start: 2021-06-17 | End: 2021-06-18

## 2021-06-17 RX ADMIN — FAMOTIDINE 20 MG: 10 INJECTION INTRAVENOUS at 08:06

## 2021-06-17 RX ADMIN — MUPIROCIN: 20 OINTMENT TOPICAL at 09:06

## 2021-06-17 RX ADMIN — INSULIN ASPART 4 UNITS: 100 INJECTION, SOLUTION INTRAVENOUS; SUBCUTANEOUS at 11:06

## 2021-06-17 RX ADMIN — INSULIN ASPART 2 UNITS: 100 INJECTION, SOLUTION INTRAVENOUS; SUBCUTANEOUS at 09:06

## 2021-06-17 RX ADMIN — CARVEDILOL 3.12 MG: 3.12 TABLET, FILM COATED ORAL at 09:06

## 2021-06-17 RX ADMIN — HEPARIN SODIUM 5000 UNITS: 5000 INJECTION INTRAVENOUS; SUBCUTANEOUS at 05:06

## 2021-06-17 RX ADMIN — DOCUSATE SODIUM 50 MG AND SENNOSIDES 8.6 MG 1 TABLET: 8.6; 5 TABLET, FILM COATED ORAL at 09:06

## 2021-06-17 RX ADMIN — CIPROFLOXACIN 400 MG: 2 INJECTION, SOLUTION INTRAVENOUS at 10:06

## 2021-06-17 RX ADMIN — MUPIROCIN: 20 OINTMENT TOPICAL at 08:06

## 2021-06-17 RX ADMIN — CIPROFLOXACIN 400 MG: 2 INJECTION, SOLUTION INTRAVENOUS at 12:06

## 2021-06-17 RX ADMIN — ASPIRIN 81 MG CHEWABLE TABLET 81 MG: 81 TABLET CHEWABLE at 08:06

## 2021-06-17 RX ADMIN — HEPARIN SODIUM 5000 UNITS: 5000 INJECTION INTRAVENOUS; SUBCUTANEOUS at 09:06

## 2021-06-17 RX ADMIN — HEPARIN SODIUM 5000 UNITS: 5000 INJECTION INTRAVENOUS; SUBCUTANEOUS at 04:06

## 2021-06-17 RX ADMIN — DOCUSATE SODIUM 50 MG AND SENNOSIDES 8.6 MG 1 TABLET: 8.6; 5 TABLET, FILM COATED ORAL at 08:06

## 2021-06-17 RX ADMIN — CARVEDILOL 3.12 MG: 3.12 TABLET, FILM COATED ORAL at 08:06

## 2021-06-17 RX ADMIN — HYDRALAZINE HYDROCHLORIDE 25 MG: 25 TABLET, FILM COATED ORAL at 09:06

## 2021-06-17 RX ADMIN — INSULIN ASPART 2 UNITS: 100 INJECTION, SOLUTION INTRAVENOUS; SUBCUTANEOUS at 05:06

## 2021-06-17 RX ADMIN — ACETAMINOPHEN 650 MG: 325 TABLET ORAL at 08:06

## 2021-06-17 RX ADMIN — HYDRALAZINE HYDROCHLORIDE 25 MG: 25 TABLET, FILM COATED ORAL at 04:06

## 2021-06-17 RX ADMIN — INSULIN DETEMIR 10 UNITS: 100 INJECTION, SOLUTION SUBCUTANEOUS at 09:06

## 2021-06-18 LAB
ANION GAP SERPL CALC-SCNC: 9 MMOL/L (ref 8–16)
BASOPHILS # BLD AUTO: ABNORMAL K/UL (ref 0–0.2)
BASOPHILS NFR BLD: 0 % (ref 0–1.9)
BUN SERPL-MCNC: 46 MG/DL (ref 8–23)
CALCIUM SERPL-MCNC: 8.9 MG/DL (ref 8.7–10.5)
CHLORIDE SERPL-SCNC: 103 MMOL/L (ref 95–110)
CO2 SERPL-SCNC: 25 MMOL/L (ref 23–29)
CREAT SERPL-MCNC: 2.8 MG/DL (ref 0.5–1.4)
DIFFERENTIAL METHOD: ABNORMAL
EOSINOPHIL # BLD AUTO: ABNORMAL K/UL (ref 0–0.5)
EOSINOPHIL NFR BLD: 3 % (ref 0–8)
ERYTHROCYTE [DISTWIDTH] IN BLOOD BY AUTOMATED COUNT: 13 % (ref 11.5–14.5)
EST. GFR  (AFRICAN AMERICAN): 26 ML/MIN/1.73 M^2
EST. GFR  (NON AFRICAN AMERICAN): 22 ML/MIN/1.73 M^2
FINAL PATHOLOGIC DIAGNOSIS: NORMAL
GLUCOSE SERPL-MCNC: 213 MG/DL (ref 70–110)
GROSS: NORMAL
HCT VFR BLD AUTO: 21.5 % (ref 40–54)
HGB BLD-MCNC: 7.2 G/DL (ref 14–18)
IMM GRANULOCYTES # BLD AUTO: ABNORMAL K/UL (ref 0–0.04)
IMM GRANULOCYTES NFR BLD AUTO: ABNORMAL % (ref 0–0.5)
LYMPHOCYTES # BLD AUTO: ABNORMAL K/UL (ref 1–4.8)
LYMPHOCYTES NFR BLD: 23 % (ref 18–48)
Lab: NORMAL
MCH RBC QN AUTO: 30.3 PG (ref 27–31)
MCHC RBC AUTO-ENTMCNC: 33.5 G/DL (ref 32–36)
MCV RBC AUTO: 90 FL (ref 82–98)
MICROSCOPIC EXAM: NORMAL
MONOCYTES # BLD AUTO: ABNORMAL K/UL (ref 0.3–1)
MONOCYTES NFR BLD: 7 % (ref 4–15)
NEUTROPHILS # BLD AUTO: ABNORMAL K/UL (ref 1.8–7.7)
NEUTROPHILS NFR BLD: 67 % (ref 38–73)
NRBC BLD-RTO: 0 /100 WBC
PLATELET # BLD AUTO: 146 K/UL (ref 150–450)
PMV BLD AUTO: 13.4 FL (ref 9.2–12.9)
POCT GLUCOSE: 236 MG/DL (ref 70–110)
POCT GLUCOSE: 284 MG/DL (ref 70–110)
POCT GLUCOSE: 303 MG/DL (ref 70–110)
POCT GLUCOSE: 359 MG/DL (ref 70–110)
POTASSIUM SERPL-SCNC: 3.6 MMOL/L (ref 3.5–5.1)
RBC # BLD AUTO: 2.38 M/UL (ref 4.6–6.2)
SODIUM SERPL-SCNC: 137 MMOL/L (ref 136–145)
WBC # BLD AUTO: 9.67 K/UL (ref 3.9–12.7)

## 2021-06-18 PROCEDURE — C9399 UNCLASSIFIED DRUGS OR BIOLOG: HCPCS | Performed by: HOSPITALIST

## 2021-06-18 PROCEDURE — 25000003 PHARM REV CODE 250: Performed by: HOSPITALIST

## 2021-06-18 PROCEDURE — 25000003 PHARM REV CODE 250: Performed by: INTERNAL MEDICINE

## 2021-06-18 PROCEDURE — 99233 PR SUBSEQUENT HOSPITAL CARE,LEVL III: ICD-10-PCS | Mod: ,,, | Performed by: STUDENT IN AN ORGANIZED HEALTH CARE EDUCATION/TRAINING PROGRAM

## 2021-06-18 PROCEDURE — 85007 BL SMEAR W/DIFF WBC COUNT: CPT | Performed by: UROLOGY

## 2021-06-18 PROCEDURE — 94761 N-INVAS EAR/PLS OXIMETRY MLT: CPT

## 2021-06-18 PROCEDURE — 97165 OT EVAL LOW COMPLEX 30 MIN: CPT

## 2021-06-18 PROCEDURE — 25000003 PHARM REV CODE 250: Performed by: UROLOGY

## 2021-06-18 PROCEDURE — 36415 COLL VENOUS BLD VENIPUNCTURE: CPT | Performed by: UROLOGY

## 2021-06-18 PROCEDURE — 97161 PT EVAL LOW COMPLEX 20 MIN: CPT

## 2021-06-18 PROCEDURE — 21400001 HC TELEMETRY ROOM

## 2021-06-18 PROCEDURE — 63600175 PHARM REV CODE 636 W HCPCS: Performed by: UROLOGY

## 2021-06-18 PROCEDURE — 85027 COMPLETE CBC AUTOMATED: CPT | Performed by: UROLOGY

## 2021-06-18 PROCEDURE — 80048 BASIC METABOLIC PNL TOTAL CA: CPT | Performed by: UROLOGY

## 2021-06-18 PROCEDURE — 63600175 PHARM REV CODE 636 W HCPCS: Performed by: INTERNAL MEDICINE

## 2021-06-18 PROCEDURE — 99233 SBSQ HOSP IP/OBS HIGH 50: CPT | Mod: ,,, | Performed by: STUDENT IN AN ORGANIZED HEALTH CARE EDUCATION/TRAINING PROGRAM

## 2021-06-18 RX ORDER — HYDRALAZINE HYDROCHLORIDE 25 MG/1
100 TABLET, FILM COATED ORAL EVERY 8 HOURS
Status: DISCONTINUED | OUTPATIENT
Start: 2021-06-18 | End: 2021-06-19 | Stop reason: HOSPADM

## 2021-06-18 RX ORDER — AMLODIPINE BESYLATE 5 MG/1
10 TABLET ORAL DAILY
Status: DISCONTINUED | OUTPATIENT
Start: 2021-06-18 | End: 2021-06-19

## 2021-06-18 RX ORDER — TALC
6 POWDER (GRAM) TOPICAL NIGHTLY PRN
Status: DISCONTINUED | OUTPATIENT
Start: 2021-06-18 | End: 2021-06-19 | Stop reason: HOSPADM

## 2021-06-18 RX ORDER — SODIUM CHLORIDE 9 MG/ML
INJECTION, SOLUTION INTRAVENOUS CONTINUOUS
Status: DISCONTINUED | OUTPATIENT
Start: 2021-06-18 | End: 2021-06-19

## 2021-06-18 RX ADMIN — INSULIN ASPART 2 UNITS: 100 INJECTION, SOLUTION INTRAVENOUS; SUBCUTANEOUS at 09:06

## 2021-06-18 RX ADMIN — FAMOTIDINE 20 MG: 10 INJECTION INTRAVENOUS at 09:06

## 2021-06-18 RX ADMIN — HYDRALAZINE HYDROCHLORIDE 100 MG: 25 TABLET, FILM COATED ORAL at 09:06

## 2021-06-18 RX ADMIN — ACETAMINOPHEN 650 MG: 325 TABLET ORAL at 08:06

## 2021-06-18 RX ADMIN — HYDRALAZINE HYDROCHLORIDE 100 MG: 25 TABLET, FILM COATED ORAL at 02:06

## 2021-06-18 RX ADMIN — DOCUSATE SODIUM 50 MG AND SENNOSIDES 8.6 MG 1 TABLET: 8.6; 5 TABLET, FILM COATED ORAL at 08:06

## 2021-06-18 RX ADMIN — CIPROFLOXACIN 400 MG: 2 INJECTION, SOLUTION INTRAVENOUS at 11:06

## 2021-06-18 RX ADMIN — INSULIN DETEMIR 10 UNITS: 100 INJECTION, SOLUTION SUBCUTANEOUS at 09:06

## 2021-06-18 RX ADMIN — ASPIRIN 81 MG CHEWABLE TABLET 81 MG: 81 TABLET CHEWABLE at 09:06

## 2021-06-18 RX ADMIN — DOCUSATE SODIUM 50 MG AND SENNOSIDES 8.6 MG 1 TABLET: 8.6; 5 TABLET, FILM COATED ORAL at 09:06

## 2021-06-18 RX ADMIN — Medication 6 MG: at 08:06

## 2021-06-18 RX ADMIN — AMLODIPINE BESYLATE 10 MG: 5 TABLET ORAL at 11:06

## 2021-06-18 RX ADMIN — MUPIROCIN: 20 OINTMENT TOPICAL at 09:06

## 2021-06-18 RX ADMIN — HEPARIN SODIUM 5000 UNITS: 5000 INJECTION INTRAVENOUS; SUBCUTANEOUS at 02:06

## 2021-06-18 RX ADMIN — CIPROFLOXACIN 400 MG: 2 INJECTION, SOLUTION INTRAVENOUS at 10:06

## 2021-06-18 RX ADMIN — HEPARIN SODIUM 5000 UNITS: 5000 INJECTION INTRAVENOUS; SUBCUTANEOUS at 07:06

## 2021-06-18 RX ADMIN — HEPARIN SODIUM 5000 UNITS: 5000 INJECTION INTRAVENOUS; SUBCUTANEOUS at 09:06

## 2021-06-18 RX ADMIN — HYDRALAZINE HYDROCHLORIDE 25 MG: 25 TABLET, FILM COATED ORAL at 07:06

## 2021-06-18 RX ADMIN — INSULIN ASPART 5 UNITS: 100 INJECTION, SOLUTION INTRAVENOUS; SUBCUTANEOUS at 04:06

## 2021-06-18 RX ADMIN — CARVEDILOL 3.12 MG: 3.12 TABLET, FILM COATED ORAL at 09:06

## 2021-06-18 RX ADMIN — SODIUM CHLORIDE: 0.9 INJECTION, SOLUTION INTRAVENOUS at 11:06

## 2021-06-18 RX ADMIN — CARVEDILOL 3.12 MG: 3.12 TABLET, FILM COATED ORAL at 08:06

## 2021-06-19 VITALS
SYSTOLIC BLOOD PRESSURE: 138 MMHG | OXYGEN SATURATION: 99 % | DIASTOLIC BLOOD PRESSURE: 75 MMHG | HEIGHT: 68 IN | HEART RATE: 82 BPM | RESPIRATION RATE: 16 BRPM | BODY MASS INDEX: 24.56 KG/M2 | WEIGHT: 162.06 LBS | TEMPERATURE: 98 F

## 2021-06-19 LAB
ANION GAP SERPL CALC-SCNC: 12 MMOL/L (ref 8–16)
BASOPHILS # BLD AUTO: 0.05 K/UL (ref 0–0.2)
BASOPHILS NFR BLD: 0.5 % (ref 0–1.9)
BUN SERPL-MCNC: 38 MG/DL (ref 8–23)
CALCIUM SERPL-MCNC: 9.4 MG/DL (ref 8.7–10.5)
CHLORIDE SERPL-SCNC: 105 MMOL/L (ref 95–110)
CO2 SERPL-SCNC: 20 MMOL/L (ref 23–29)
CREAT SERPL-MCNC: 2.8 MG/DL (ref 0.5–1.4)
DIFFERENTIAL METHOD: ABNORMAL
EOSINOPHIL # BLD AUTO: 0.4 K/UL (ref 0–0.5)
EOSINOPHIL NFR BLD: 4.5 % (ref 0–8)
ERYTHROCYTE [DISTWIDTH] IN BLOOD BY AUTOMATED COUNT: 12.9 % (ref 11.5–14.5)
EST. GFR  (AFRICAN AMERICAN): 26 ML/MIN/1.73 M^2
EST. GFR  (NON AFRICAN AMERICAN): 22 ML/MIN/1.73 M^2
GLUCOSE SERPL-MCNC: 214 MG/DL (ref 70–110)
HCT VFR BLD AUTO: 26.6 % (ref 40–54)
HGB BLD-MCNC: 9 G/DL (ref 14–18)
IMM GRANULOCYTES # BLD AUTO: 0.42 K/UL (ref 0–0.04)
IMM GRANULOCYTES NFR BLD AUTO: 4.4 % (ref 0–0.5)
LYMPHOCYTES # BLD AUTO: 2 K/UL (ref 1–4.8)
LYMPHOCYTES NFR BLD: 21 % (ref 18–48)
MCH RBC QN AUTO: 30.6 PG (ref 27–31)
MCHC RBC AUTO-ENTMCNC: 33.8 G/DL (ref 32–36)
MCV RBC AUTO: 91 FL (ref 82–98)
MONOCYTES # BLD AUTO: 0.8 K/UL (ref 0.3–1)
MONOCYTES NFR BLD: 8.6 % (ref 4–15)
NEUTROPHILS # BLD AUTO: 5.8 K/UL (ref 1.8–7.7)
NEUTROPHILS NFR BLD: 61 % (ref 38–73)
NRBC BLD-RTO: 0 /100 WBC
PLATELET # BLD AUTO: 176 K/UL (ref 150–450)
PMV BLD AUTO: 13.6 FL (ref 9.2–12.9)
POCT GLUCOSE: 218 MG/DL (ref 70–110)
POCT GLUCOSE: 247 MG/DL (ref 70–110)
POTASSIUM SERPL-SCNC: 4.3 MMOL/L (ref 3.5–5.1)
RBC # BLD AUTO: 2.94 M/UL (ref 4.6–6.2)
SODIUM SERPL-SCNC: 137 MMOL/L (ref 136–145)
WBC # BLD AUTO: 9.56 K/UL (ref 3.9–12.7)

## 2021-06-19 PROCEDURE — 25000003 PHARM REV CODE 250: Performed by: UROLOGY

## 2021-06-19 PROCEDURE — 25000003 PHARM REV CODE 250: Performed by: HOSPITALIST

## 2021-06-19 PROCEDURE — 80048 BASIC METABOLIC PNL TOTAL CA: CPT | Performed by: HOSPITALIST

## 2021-06-19 PROCEDURE — 85025 COMPLETE CBC W/AUTO DIFF WBC: CPT | Performed by: UROLOGY

## 2021-06-19 PROCEDURE — 63600175 PHARM REV CODE 636 W HCPCS: Performed by: UROLOGY

## 2021-06-19 RX ORDER — HYDRALAZINE HYDROCHLORIDE 100 MG/1
100 TABLET, FILM COATED ORAL EVERY 8 HOURS
Qty: 90 TABLET | Refills: 0 | Status: ON HOLD | OUTPATIENT
Start: 2021-06-19 | End: 2021-10-16 | Stop reason: HOSPADM

## 2021-06-19 RX ORDER — CLONIDINE HYDROCHLORIDE 0.1 MG/1
0.2 TABLET ORAL ONCE
Status: COMPLETED | OUTPATIENT
Start: 2021-06-19 | End: 2021-06-19

## 2021-06-19 RX ORDER — AMLODIPINE BESYLATE 10 MG/1
10 TABLET ORAL DAILY
Qty: 30 TABLET | Refills: 0 | Status: ON HOLD | OUTPATIENT
Start: 2021-06-19 | End: 2021-10-16 | Stop reason: HOSPADM

## 2021-06-19 RX ORDER — CIPROFLOXACIN 500 MG/1
500 TABLET ORAL 2 TIMES DAILY
Qty: 20 TABLET | Refills: 0 | Status: SHIPPED | OUTPATIENT
Start: 2021-06-19 | End: 2021-06-29

## 2021-06-19 RX ORDER — AMLODIPINE BESYLATE 5 MG/1
10 TABLET ORAL DAILY
Status: DISCONTINUED | OUTPATIENT
Start: 2021-06-19 | End: 2021-06-19 | Stop reason: HOSPADM

## 2021-06-19 RX ADMIN — HEPARIN SODIUM 5000 UNITS: 5000 INJECTION INTRAVENOUS; SUBCUTANEOUS at 06:06

## 2021-06-19 RX ADMIN — FAMOTIDINE 20 MG: 10 INJECTION INTRAVENOUS at 10:06

## 2021-06-19 RX ADMIN — SODIUM CHLORIDE: 0.9 INJECTION, SOLUTION INTRAVENOUS at 06:06

## 2021-06-19 RX ADMIN — AMLODIPINE BESYLATE 10 MG: 5 TABLET ORAL at 10:06

## 2021-06-19 RX ADMIN — CLONIDINE HYDROCHLORIDE 0.2 MG: 0.1 TABLET ORAL at 01:06

## 2021-06-19 RX ADMIN — HYDRALAZINE HYDROCHLORIDE 100 MG: 25 TABLET, FILM COATED ORAL at 06:06

## 2021-06-19 RX ADMIN — CARVEDILOL 3.12 MG: 3.12 TABLET, FILM COATED ORAL at 10:06

## 2021-06-19 RX ADMIN — HYDRALAZINE HYDROCHLORIDE 100 MG: 25 TABLET, FILM COATED ORAL at 01:06

## 2021-06-19 RX ADMIN — ASPIRIN 81 MG CHEWABLE TABLET 81 MG: 81 TABLET CHEWABLE at 10:06

## 2021-06-19 RX ADMIN — DOCUSATE SODIUM 50 MG AND SENNOSIDES 8.6 MG 1 TABLET: 8.6; 5 TABLET, FILM COATED ORAL at 10:06

## 2021-06-20 LAB — BACTERIA BLD CULT: NORMAL

## 2021-06-21 LAB
BACTERIA BLD CULT: ABNORMAL

## 2021-06-22 ENCOUNTER — PATIENT OUTREACH (OUTPATIENT)
Dept: ADMINISTRATIVE | Facility: CLINIC | Age: 66
End: 2021-06-22

## 2021-06-25 ENCOUNTER — OFFICE VISIT (OUTPATIENT)
Dept: UROLOGY | Facility: CLINIC | Age: 66
End: 2021-06-25
Payer: COMMERCIAL

## 2021-06-25 VITALS — BODY MASS INDEX: 24.64 KG/M2 | HEIGHT: 68 IN | WEIGHT: 162.56 LBS

## 2021-06-25 DIAGNOSIS — R33.9 URINARY RETENTION: ICD-10-CM

## 2021-06-25 DIAGNOSIS — N13.30 HYDRONEPHROSIS, RIGHT: Primary | ICD-10-CM

## 2021-06-25 PROCEDURE — 99213 PR OFFICE/OUTPT VISIT, EST, LEVL III, 20-29 MIN: ICD-10-PCS | Mod: S$GLB,,, | Performed by: NURSE PRACTITIONER

## 2021-06-25 PROCEDURE — 81001 URINALYSIS AUTO W/SCOPE: CPT | Mod: PBBFAC | Performed by: NURSE PRACTITIONER

## 2021-06-25 PROCEDURE — 99213 OFFICE O/P EST LOW 20 MIN: CPT | Mod: S$GLB,,, | Performed by: NURSE PRACTITIONER

## 2021-06-25 PROCEDURE — 51798 US URINE CAPACITY MEASURE: CPT | Mod: PBBFAC | Performed by: NURSE PRACTITIONER

## 2021-06-25 PROCEDURE — 99999 PR PBB SHADOW E&M-EST. PATIENT-LVL III: ICD-10-PCS | Mod: PBBFAC,,, | Performed by: NURSE PRACTITIONER

## 2021-06-25 PROCEDURE — 99213 OFFICE O/P EST LOW 20 MIN: CPT | Mod: PBBFAC | Performed by: NURSE PRACTITIONER

## 2021-06-25 PROCEDURE — 99999 PR PBB SHADOW E&M-EST. PATIENT-LVL III: CPT | Mod: PBBFAC,,, | Performed by: NURSE PRACTITIONER

## 2021-06-25 PROCEDURE — 87086 URINE CULTURE/COLONY COUNT: CPT | Performed by: NURSE PRACTITIONER

## 2021-06-27 LAB — BACTERIA UR CULT: NO GROWTH

## 2021-06-29 LAB
BILIRUB SERPL-MCNC: ABNORMAL MG/DL
BLOOD URINE, POC: 50
COLOR, POC UA: YELLOW
GLUCOSE UR QL STRIP: 500
KETONES UR QL STRIP: ABNORMAL
LEUKOCYTE ESTERASE URINE, POC: ABNORMAL
NITRITE, POC UA: ABNORMAL
PH, POC UA: 5
PROTEIN, POC: 30
SPECIFIC GRAVITY, POC UA: 1000
UROBILINOGEN, POC UA: ABNORMAL

## 2021-08-05 ENCOUNTER — PROCEDURE VISIT (OUTPATIENT)
Dept: UROLOGY | Facility: CLINIC | Age: 66
End: 2021-08-05
Payer: MEDICAID

## 2021-08-05 VITALS — BODY MASS INDEX: 24.66 KG/M2 | HEIGHT: 68 IN | WEIGHT: 162.69 LBS

## 2021-08-05 DIAGNOSIS — N13.30 HYDRONEPHROSIS, RIGHT: Primary | ICD-10-CM

## 2021-08-05 PROCEDURE — 52310 CYSTOSCOPY AND TREATMENT: CPT | Mod: S$GLB,,, | Performed by: UROLOGY

## 2021-08-05 PROCEDURE — 52310 CYSTOSCOPY: ICD-10-PCS | Mod: S$GLB,,, | Performed by: UROLOGY

## 2021-08-05 RX ORDER — CIPROFLOXACIN 500 MG/1
500 TABLET ORAL 2 TIMES DAILY
Qty: 6 TABLET | Refills: 0 | Status: SHIPPED | OUTPATIENT
Start: 2021-08-05 | End: 2021-08-08

## 2021-08-05 RX ORDER — PHENAZOPYRIDINE HYDROCHLORIDE 200 MG/1
200 TABLET, FILM COATED ORAL 3 TIMES DAILY PRN
Qty: 21 TABLET | Refills: 0 | Status: SHIPPED | OUTPATIENT
Start: 2021-08-05 | End: 2021-08-15

## 2021-09-17 ENCOUNTER — OFFICE VISIT (OUTPATIENT)
Dept: UROLOGY | Facility: CLINIC | Age: 66
End: 2021-09-17
Payer: MEDICARE

## 2021-09-17 VITALS — WEIGHT: 161.63 LBS | HEIGHT: 68 IN | BODY MASS INDEX: 24.49 KG/M2

## 2021-09-17 DIAGNOSIS — R33.9 URINARY RETENTION: ICD-10-CM

## 2021-09-17 DIAGNOSIS — N13.30 HYDRONEPHROSIS, RIGHT: ICD-10-CM

## 2021-09-17 DIAGNOSIS — C61 PROSTATE CANCER: Primary | ICD-10-CM

## 2021-09-17 PROCEDURE — 81001 URINALYSIS AUTO W/SCOPE: CPT | Mod: S$GLB,,, | Performed by: UROLOGY

## 2021-09-17 PROCEDURE — 81001 URINALYSIS AUTO W/SCOPE: CPT | Mod: PBBFAC | Performed by: UROLOGY

## 2021-09-17 PROCEDURE — 81001 PR  URINALYSIS, AUTO, W/SCOPE: ICD-10-PCS | Mod: S$GLB,,, | Performed by: UROLOGY

## 2021-09-17 PROCEDURE — 99213 PR OFFICE/OUTPT VISIT, EST, LEVL III, 20-29 MIN: ICD-10-PCS | Mod: S$GLB,,, | Performed by: UROLOGY

## 2021-09-17 PROCEDURE — 99214 OFFICE O/P EST MOD 30 MIN: CPT | Mod: PBBFAC | Performed by: UROLOGY

## 2021-09-17 PROCEDURE — 99999 PR PBB SHADOW E&M-EST. PATIENT-LVL IV: ICD-10-PCS | Mod: PBBFAC,,, | Performed by: UROLOGY

## 2021-09-17 PROCEDURE — 99999 PR PBB SHADOW E&M-EST. PATIENT-LVL IV: CPT | Mod: PBBFAC,,, | Performed by: UROLOGY

## 2021-09-17 PROCEDURE — 99213 OFFICE O/P EST LOW 20 MIN: CPT | Mod: S$GLB,,, | Performed by: UROLOGY

## 2021-10-14 ENCOUNTER — HOSPITAL ENCOUNTER (OUTPATIENT)
Facility: HOSPITAL | Age: 66
Discharge: HOME | End: 2021-10-16
Attending: EMERGENCY MEDICINE | Admitting: EMERGENCY MEDICINE
Payer: MEDICARE

## 2021-10-14 DIAGNOSIS — E87.5 HIGH POTASSIUM: ICD-10-CM

## 2021-10-14 DIAGNOSIS — N17.9 ACUTE KIDNEY INJURY: ICD-10-CM

## 2021-10-14 DIAGNOSIS — E87.5 HYPERKALEMIA: Primary | ICD-10-CM

## 2021-10-14 DIAGNOSIS — R07.9 CHEST PAIN: ICD-10-CM

## 2021-10-14 LAB
ALBUMIN SERPL BCP-MCNC: 4.4 G/DL (ref 3.5–5.2)
ALP SERPL-CCNC: 101 U/L (ref 55–135)
ALT SERPL W/O P-5'-P-CCNC: 23 U/L (ref 10–44)
ANION GAP SERPL CALC-SCNC: 11 MMOL/L (ref 8–16)
AST SERPL-CCNC: 16 U/L (ref 10–40)
BACTERIA #/AREA URNS HPF: NORMAL /HPF
BASOPHILS # BLD AUTO: 0.03 K/UL (ref 0–0.2)
BASOPHILS NFR BLD: 0.5 % (ref 0–1.9)
BILIRUB SERPL-MCNC: 0.3 MG/DL (ref 0.1–1)
BILIRUB UR QL STRIP: NEGATIVE
BUN SERPL-MCNC: 38 MG/DL (ref 8–23)
CALCIUM SERPL-MCNC: 10.2 MG/DL (ref 8.7–10.5)
CHLORIDE SERPL-SCNC: 106 MMOL/L (ref 95–110)
CK SERPL-CCNC: 74 U/L (ref 20–200)
CLARITY UR: CLEAR
CO2 SERPL-SCNC: 21 MMOL/L (ref 23–29)
COLOR UR: COLORLESS
CREAT SERPL-MCNC: 3.3 MG/DL (ref 0.5–1.4)
CTP QC/QA: YES
DIFFERENTIAL METHOD: ABNORMAL
EOSINOPHIL # BLD AUTO: 0.2 K/UL (ref 0–0.5)
EOSINOPHIL NFR BLD: 3.4 % (ref 0–8)
ERYTHROCYTE [DISTWIDTH] IN BLOOD BY AUTOMATED COUNT: 11.6 % (ref 11.5–14.5)
EST. GFR  (AFRICAN AMERICAN): 21 ML/MIN/1.73 M^2
EST. GFR  (NON AFRICAN AMERICAN): 18 ML/MIN/1.73 M^2
GLUCOSE SERPL-MCNC: 208 MG/DL (ref 70–110)
GLUCOSE UR QL STRIP: ABNORMAL
HCT VFR BLD AUTO: 26.8 % (ref 40–54)
HGB BLD-MCNC: 9.3 G/DL (ref 14–18)
HGB UR QL STRIP: NEGATIVE
HYALINE CASTS #/AREA URNS LPF: 0 /LPF
IMM GRANULOCYTES # BLD AUTO: 0.04 K/UL (ref 0–0.04)
IMM GRANULOCYTES NFR BLD AUTO: 0.7 % (ref 0–0.5)
KETONES UR QL STRIP: NEGATIVE
LEUKOCYTE ESTERASE UR QL STRIP: NEGATIVE
LYMPHOCYTES # BLD AUTO: 2.7 K/UL (ref 1–4.8)
LYMPHOCYTES NFR BLD: 43.8 % (ref 18–48)
MAGNESIUM SERPL-MCNC: 1.7 MG/DL (ref 1.6–2.6)
MCH RBC QN AUTO: 32.5 PG (ref 27–31)
MCHC RBC AUTO-ENTMCNC: 34.7 G/DL (ref 32–36)
MCV RBC AUTO: 94 FL (ref 82–98)
MICROSCOPIC COMMENT: NORMAL
MONOCYTES # BLD AUTO: 0.5 K/UL (ref 0.3–1)
MONOCYTES NFR BLD: 7.5 % (ref 4–15)
NEUTROPHILS # BLD AUTO: 2.7 K/UL (ref 1.8–7.7)
NEUTROPHILS NFR BLD: 44.1 % (ref 38–73)
NITRITE UR QL STRIP: NEGATIVE
NRBC BLD-RTO: 0 /100 WBC
PH UR STRIP: 7 [PH] (ref 5–8)
PHOSPHATE SERPL-MCNC: 4 MG/DL (ref 2.7–4.5)
PLATELET # BLD AUTO: 179 K/UL (ref 150–450)
PMV BLD AUTO: 11.9 FL (ref 9.2–12.9)
POTASSIUM SERPL-SCNC: 6.1 MMOL/L (ref 3.5–5.1)
PROT SERPL-MCNC: 8.3 G/DL (ref 6–8.4)
PROT UR QL STRIP: ABNORMAL
RBC # BLD AUTO: 2.86 M/UL (ref 4.6–6.2)
RBC #/AREA URNS HPF: 0 /HPF (ref 0–4)
SARS-COV-2 RDRP RESP QL NAA+PROBE: NEGATIVE
SODIUM SERPL-SCNC: 138 MMOL/L (ref 136–145)
SP GR UR STRIP: 1.01 (ref 1–1.03)
URN SPEC COLLECT METH UR: ABNORMAL
UROBILINOGEN UR STRIP-ACNC: NEGATIVE EU/DL
WBC # BLD AUTO: 6.14 K/UL (ref 3.9–12.7)
WBC #/AREA URNS HPF: 0 /HPF (ref 0–5)

## 2021-10-14 PROCEDURE — 63600175 PHARM REV CODE 636 W HCPCS: Performed by: EMERGENCY MEDICINE

## 2021-10-14 PROCEDURE — 25000003 PHARM REV CODE 250: Performed by: NURSE PRACTITIONER

## 2021-10-14 PROCEDURE — 93010 EKG 12-LEAD: ICD-10-PCS | Mod: ,,, | Performed by: INTERNAL MEDICINE

## 2021-10-14 PROCEDURE — 84100 ASSAY OF PHOSPHORUS: CPT | Performed by: PHYSICIAN ASSISTANT

## 2021-10-14 PROCEDURE — 63600175 PHARM REV CODE 636 W HCPCS: Performed by: NURSE PRACTITIONER

## 2021-10-14 PROCEDURE — 80053 COMPREHEN METABOLIC PANEL: CPT | Performed by: PHYSICIAN ASSISTANT

## 2021-10-14 PROCEDURE — 93005 ELECTROCARDIOGRAM TRACING: CPT

## 2021-10-14 PROCEDURE — 96361 HYDRATE IV INFUSION ADD-ON: CPT

## 2021-10-14 PROCEDURE — 93010 ELECTROCARDIOGRAM REPORT: CPT | Mod: ,,, | Performed by: INTERNAL MEDICINE

## 2021-10-14 PROCEDURE — 83735 ASSAY OF MAGNESIUM: CPT | Performed by: PHYSICIAN ASSISTANT

## 2021-10-14 PROCEDURE — 25000003 PHARM REV CODE 250: Performed by: EMERGENCY MEDICINE

## 2021-10-14 PROCEDURE — 96374 THER/PROPH/DIAG INJ IV PUSH: CPT

## 2021-10-14 PROCEDURE — 99285 EMERGENCY DEPT VISIT HI MDM: CPT | Mod: 25

## 2021-10-14 PROCEDURE — 96372 THER/PROPH/DIAG INJ SC/IM: CPT | Mod: 59

## 2021-10-14 PROCEDURE — 83036 HEMOGLOBIN GLYCOSYLATED A1C: CPT | Performed by: NURSE PRACTITIONER

## 2021-10-14 PROCEDURE — G0378 HOSPITAL OBSERVATION PER HR: HCPCS

## 2021-10-14 PROCEDURE — 82550 ASSAY OF CK (CPK): CPT | Performed by: PHYSICIAN ASSISTANT

## 2021-10-14 PROCEDURE — U0002 COVID-19 LAB TEST NON-CDC: HCPCS | Performed by: PHYSICIAN ASSISTANT

## 2021-10-14 PROCEDURE — 81000 URINALYSIS NONAUTO W/SCOPE: CPT | Performed by: PHYSICIAN ASSISTANT

## 2021-10-14 PROCEDURE — 96375 TX/PRO/DX INJ NEW DRUG ADDON: CPT

## 2021-10-14 PROCEDURE — 85025 COMPLETE CBC W/AUTO DIFF WBC: CPT | Performed by: PHYSICIAN ASSISTANT

## 2021-10-14 RX ORDER — IBUPROFEN 200 MG
24 TABLET ORAL
Status: DISCONTINUED | OUTPATIENT
Start: 2021-10-14 | End: 2021-10-16 | Stop reason: HOSPADM

## 2021-10-14 RX ORDER — CLONIDINE HYDROCHLORIDE 0.1 MG/1
TABLET ORAL
COMMUNITY

## 2021-10-14 RX ORDER — DILTIAZEM HYDROCHLORIDE 120 MG/1
240 CAPSULE, COATED, EXTENDED RELEASE ORAL DAILY
Status: DISCONTINUED | OUTPATIENT
Start: 2021-10-15 | End: 2021-10-16 | Stop reason: HOSPADM

## 2021-10-14 RX ORDER — ENOXAPARIN SODIUM 100 MG/ML
30 INJECTION SUBCUTANEOUS EVERY 24 HOURS
Status: DISCONTINUED | OUTPATIENT
Start: 2021-10-14 | End: 2021-10-16 | Stop reason: HOSPADM

## 2021-10-14 RX ORDER — IBUPROFEN 200 MG
16 TABLET ORAL
Status: DISCONTINUED | OUTPATIENT
Start: 2021-10-14 | End: 2021-10-16 | Stop reason: HOSPADM

## 2021-10-14 RX ORDER — GLUCAGON 1 MG
1 KIT INJECTION
Status: DISCONTINUED | OUTPATIENT
Start: 2021-10-14 | End: 2021-10-16 | Stop reason: HOSPADM

## 2021-10-14 RX ORDER — AMOXICILLIN 250 MG
1 CAPSULE ORAL 2 TIMES DAILY PRN
Status: DISCONTINUED | OUTPATIENT
Start: 2021-10-14 | End: 2021-10-16 | Stop reason: HOSPADM

## 2021-10-14 RX ORDER — CALCIUM CARBONATE 200(500)MG
500 TABLET,CHEWABLE ORAL 3 TIMES DAILY PRN
Status: DISCONTINUED | OUTPATIENT
Start: 2021-10-14 | End: 2021-10-16 | Stop reason: HOSPADM

## 2021-10-14 RX ORDER — CLONIDINE HYDROCHLORIDE 0.1 MG/1
0.1 TABLET ORAL 2 TIMES DAILY
Status: DISCONTINUED | OUTPATIENT
Start: 2021-10-14 | End: 2021-10-16 | Stop reason: HOSPADM

## 2021-10-14 RX ORDER — TALC
6 POWDER (GRAM) TOPICAL NIGHTLY PRN
Status: DISCONTINUED | OUTPATIENT
Start: 2021-10-14 | End: 2021-10-16 | Stop reason: HOSPADM

## 2021-10-14 RX ORDER — FUROSEMIDE 10 MG/ML
80 INJECTION INTRAMUSCULAR; INTRAVENOUS
Status: COMPLETED | OUTPATIENT
Start: 2021-10-14 | End: 2021-10-14

## 2021-10-14 RX ORDER — DILTIAZEM HYDROCHLORIDE 240 MG/1
240 CAPSULE, EXTENDED RELEASE ORAL DAILY
COMMUNITY
Start: 2021-10-13

## 2021-10-14 RX ORDER — LABETALOL HYDROCHLORIDE 5 MG/ML
10 INJECTION, SOLUTION INTRAVENOUS
Status: COMPLETED | OUTPATIENT
Start: 2021-10-14 | End: 2021-10-14

## 2021-10-14 RX ORDER — TAMSULOSIN HYDROCHLORIDE 0.4 MG/1
0.4 CAPSULE ORAL DAILY
Status: DISCONTINUED | OUTPATIENT
Start: 2021-10-15 | End: 2021-10-16 | Stop reason: HOSPADM

## 2021-10-14 RX ORDER — LINAGLIPTIN 5 MG/1
TABLET, FILM COATED ORAL
COMMUNITY
Start: 2021-10-13

## 2021-10-14 RX ORDER — SPIRONOLACTONE 50 MG/1
50 TABLET, FILM COATED ORAL 2 TIMES DAILY
Status: ON HOLD | COMMUNITY
Start: 2021-10-13 | End: 2021-10-16 | Stop reason: HOSPADM

## 2021-10-14 RX ORDER — CARVEDILOL 6.25 MG/1
6.25 TABLET ORAL 2 TIMES DAILY
Status: DISCONTINUED | OUTPATIENT
Start: 2021-10-14 | End: 2021-10-16 | Stop reason: HOSPADM

## 2021-10-14 RX ORDER — NALOXONE HCL 0.4 MG/ML
0.02 VIAL (ML) INJECTION
Status: DISCONTINUED | OUTPATIENT
Start: 2021-10-14 | End: 2021-10-16 | Stop reason: HOSPADM

## 2021-10-14 RX ORDER — SODIUM CHLORIDE 0.9 % (FLUSH) 0.9 %
10 SYRINGE (ML) INJECTION EVERY 12 HOURS PRN
Status: DISCONTINUED | OUTPATIENT
Start: 2021-10-14 | End: 2021-10-16 | Stop reason: HOSPADM

## 2021-10-14 RX ORDER — NAPROXEN SODIUM 220 MG/1
81 TABLET, FILM COATED ORAL DAILY
Status: DISCONTINUED | OUTPATIENT
Start: 2021-10-15 | End: 2021-10-16 | Stop reason: HOSPADM

## 2021-10-14 RX ORDER — ACETAMINOPHEN 325 MG/1
650 TABLET ORAL EVERY 4 HOURS PRN
Status: DISCONTINUED | OUTPATIENT
Start: 2021-10-14 | End: 2021-10-16 | Stop reason: HOSPADM

## 2021-10-14 RX ADMIN — CLONIDINE HYDROCHLORIDE 0.1 MG: 0.1 TABLET ORAL at 11:10

## 2021-10-14 RX ADMIN — LABETALOL HYDROCHLORIDE 10 MG: 5 INJECTION INTRAVENOUS at 10:10

## 2021-10-14 RX ADMIN — ENOXAPARIN SODIUM 30 MG: 30 INJECTION SUBCUTANEOUS at 11:10

## 2021-10-14 RX ADMIN — CARVEDILOL 6.25 MG: 6.25 TABLET, FILM COATED ORAL at 11:10

## 2021-10-14 RX ADMIN — SODIUM CHLORIDE 1000 ML: 0.9 INJECTION, SOLUTION INTRAVENOUS at 09:10

## 2021-10-14 RX ADMIN — FUROSEMIDE 80 MG: 10 INJECTION, SOLUTION INTRAMUSCULAR; INTRAVENOUS at 09:10

## 2021-10-14 RX ADMIN — SODIUM ZIRCONIUM CYCLOSILICATE 10 G: 10 POWDER, FOR SUSPENSION ORAL at 08:10

## 2021-10-15 LAB
ANION GAP SERPL CALC-SCNC: 13 MMOL/L (ref 8–16)
ANION GAP SERPL CALC-SCNC: 15 MMOL/L (ref 8–16)
BASOPHILS # BLD AUTO: 0.04 K/UL (ref 0–0.2)
BASOPHILS NFR BLD: 0.6 % (ref 0–1.9)
BUN SERPL-MCNC: 43 MG/DL (ref 8–23)
BUN SERPL-MCNC: 45 MG/DL (ref 8–23)
CALCIUM SERPL-MCNC: 9.5 MG/DL (ref 8.7–10.5)
CALCIUM SERPL-MCNC: 9.7 MG/DL (ref 8.7–10.5)
CHLORIDE SERPL-SCNC: 106 MMOL/L (ref 95–110)
CHLORIDE SERPL-SCNC: 106 MMOL/L (ref 95–110)
CO2 SERPL-SCNC: 14 MMOL/L (ref 23–29)
CO2 SERPL-SCNC: 17 MMOL/L (ref 23–29)
CREAT SERPL-MCNC: 3 MG/DL (ref 0.5–1.4)
CREAT SERPL-MCNC: 3.2 MG/DL (ref 0.5–1.4)
DIFFERENTIAL METHOD: ABNORMAL
EOSINOPHIL # BLD AUTO: 0.2 K/UL (ref 0–0.5)
EOSINOPHIL NFR BLD: 2.6 % (ref 0–8)
ERYTHROCYTE [DISTWIDTH] IN BLOOD BY AUTOMATED COUNT: 11.8 % (ref 11.5–14.5)
EST. GFR  (AFRICAN AMERICAN): 22 ML/MIN/1.73 M^2
EST. GFR  (AFRICAN AMERICAN): 24 ML/MIN/1.73 M^2
EST. GFR  (NON AFRICAN AMERICAN): 19 ML/MIN/1.73 M^2
EST. GFR  (NON AFRICAN AMERICAN): 21 ML/MIN/1.73 M^2
ESTIMATED AVG GLUCOSE: 169 MG/DL (ref 68–131)
GLUCOSE SERPL-MCNC: 274 MG/DL (ref 70–110)
GLUCOSE SERPL-MCNC: 314 MG/DL (ref 70–110)
HBA1C MFR BLD: 7.5 % (ref 4–5.6)
HCT VFR BLD AUTO: 26.7 % (ref 40–54)
HGB BLD-MCNC: 9.6 G/DL (ref 14–18)
IMM GRANULOCYTES # BLD AUTO: 0.07 K/UL (ref 0–0.04)
IMM GRANULOCYTES NFR BLD AUTO: 1.1 % (ref 0–0.5)
LYMPHOCYTES # BLD AUTO: 2.6 K/UL (ref 1–4.8)
LYMPHOCYTES NFR BLD: 42.7 % (ref 18–48)
MCH RBC QN AUTO: 31.8 PG (ref 27–31)
MCHC RBC AUTO-ENTMCNC: 36 G/DL (ref 32–36)
MCV RBC AUTO: 88 FL (ref 82–98)
MONOCYTES # BLD AUTO: 0.5 K/UL (ref 0.3–1)
MONOCYTES NFR BLD: 7.5 % (ref 4–15)
NEUTROPHILS # BLD AUTO: 2.8 K/UL (ref 1.8–7.7)
NEUTROPHILS NFR BLD: 45.5 % (ref 38–73)
NRBC BLD-RTO: 0 /100 WBC
PLATELET # BLD AUTO: 166 K/UL (ref 150–450)
PMV BLD AUTO: 12.4 FL (ref 9.2–12.9)
POCT GLUCOSE: 278 MG/DL (ref 70–110)
POCT GLUCOSE: 298 MG/DL (ref 70–110)
POCT GLUCOSE: 329 MG/DL (ref 70–110)
POTASSIUM SERPL-SCNC: 5.3 MMOL/L (ref 3.5–5.1)
POTASSIUM SERPL-SCNC: 5.3 MMOL/L (ref 3.5–5.1)
POTASSIUM SERPL-SCNC: 5.9 MMOL/L (ref 3.5–5.1)
RBC # BLD AUTO: 3.02 M/UL (ref 4.6–6.2)
SODIUM SERPL-SCNC: 135 MMOL/L (ref 136–145)
SODIUM SERPL-SCNC: 136 MMOL/L (ref 136–145)
WBC # BLD AUTO: 6.16 K/UL (ref 3.9–12.7)

## 2021-10-15 PROCEDURE — G0378 HOSPITAL OBSERVATION PER HR: HCPCS

## 2021-10-15 PROCEDURE — 85025 COMPLETE CBC W/AUTO DIFF WBC: CPT | Performed by: NURSE PRACTITIONER

## 2021-10-15 PROCEDURE — 36415 COLL VENOUS BLD VENIPUNCTURE: CPT | Performed by: NURSE PRACTITIONER

## 2021-10-15 PROCEDURE — 96361 HYDRATE IV INFUSION ADD-ON: CPT

## 2021-10-15 PROCEDURE — 80048 BASIC METABOLIC PNL TOTAL CA: CPT | Performed by: NURSE PRACTITIONER

## 2021-10-15 PROCEDURE — 63600175 PHARM REV CODE 636 W HCPCS: Performed by: NURSE PRACTITIONER

## 2021-10-15 PROCEDURE — 25000003 PHARM REV CODE 250: Performed by: NURSE PRACTITIONER

## 2021-10-15 PROCEDURE — 36415 COLL VENOUS BLD VENIPUNCTURE: CPT | Performed by: STUDENT IN AN ORGANIZED HEALTH CARE EDUCATION/TRAINING PROGRAM

## 2021-10-15 PROCEDURE — 80048 BASIC METABOLIC PNL TOTAL CA: CPT | Mod: 91 | Performed by: NURSE PRACTITIONER

## 2021-10-15 PROCEDURE — 84132 ASSAY OF SERUM POTASSIUM: CPT | Mod: 91 | Performed by: STUDENT IN AN ORGANIZED HEALTH CARE EDUCATION/TRAINING PROGRAM

## 2021-10-15 PROCEDURE — 96372 THER/PROPH/DIAG INJ SC/IM: CPT

## 2021-10-15 RX ORDER — INSULIN ASPART 100 [IU]/ML
0-5 INJECTION, SOLUTION INTRAVENOUS; SUBCUTANEOUS EVERY 6 HOURS PRN
Status: DISCONTINUED | OUTPATIENT
Start: 2021-10-15 | End: 2021-10-16 | Stop reason: HOSPADM

## 2021-10-15 RX ORDER — SODIUM CHLORIDE 9 MG/ML
INJECTION, SOLUTION INTRAVENOUS CONTINUOUS
Status: DISCONTINUED | OUTPATIENT
Start: 2021-10-15 | End: 2021-10-15

## 2021-10-15 RX ADMIN — ASPIRIN 81 MG CHEWABLE TABLET 81 MG: 81 TABLET CHEWABLE at 09:10

## 2021-10-15 RX ADMIN — INSULIN ASPART 4 UNITS: 100 INJECTION, SOLUTION INTRAVENOUS; SUBCUTANEOUS at 05:10

## 2021-10-15 RX ADMIN — ENOXAPARIN SODIUM 30 MG: 30 INJECTION SUBCUTANEOUS at 05:10

## 2021-10-15 RX ADMIN — CARVEDILOL 6.25 MG: 6.25 TABLET, FILM COATED ORAL at 09:10

## 2021-10-15 RX ADMIN — TAMSULOSIN HYDROCHLORIDE 0.4 MG: 0.4 CAPSULE ORAL at 09:10

## 2021-10-15 RX ADMIN — INSULIN ASPART 3 UNITS: 100 INJECTION, SOLUTION INTRAVENOUS; SUBCUTANEOUS at 05:10

## 2021-10-15 RX ADMIN — SODIUM CHLORIDE: 0.9 INJECTION, SOLUTION INTRAVENOUS at 12:10

## 2021-10-15 RX ADMIN — CLONIDINE HYDROCHLORIDE 0.1 MG: 0.1 TABLET ORAL at 09:10

## 2021-10-15 RX ADMIN — CLONIDINE HYDROCHLORIDE 0.1 MG: 0.1 TABLET ORAL at 08:10

## 2021-10-15 RX ADMIN — CARVEDILOL 6.25 MG: 6.25 TABLET, FILM COATED ORAL at 08:10

## 2021-10-15 RX ADMIN — DILTIAZEM HYDROCHLORIDE 240 MG: 120 CAPSULE, COATED, EXTENDED RELEASE ORAL at 09:10

## 2021-10-15 RX ADMIN — INSULIN ASPART 3 UNITS: 100 INJECTION, SOLUTION INTRAVENOUS; SUBCUTANEOUS at 11:10

## 2021-10-15 RX ADMIN — SODIUM CHLORIDE: 0.9 INJECTION, SOLUTION INTRAVENOUS at 05:10

## 2021-10-16 VITALS
OXYGEN SATURATION: 100 % | SYSTOLIC BLOOD PRESSURE: 149 MMHG | BODY MASS INDEX: 24.25 KG/M2 | HEART RATE: 70 BPM | DIASTOLIC BLOOD PRESSURE: 82 MMHG | RESPIRATION RATE: 18 BRPM | WEIGHT: 160 LBS | HEIGHT: 68 IN | TEMPERATURE: 98 F

## 2021-10-16 LAB
ANION GAP SERPL CALC-SCNC: 10 MMOL/L (ref 8–16)
BUN SERPL-MCNC: 45 MG/DL (ref 8–23)
CALCIUM SERPL-MCNC: 9.8 MG/DL (ref 8.7–10.5)
CHLORIDE SERPL-SCNC: 107 MMOL/L (ref 95–110)
CO2 SERPL-SCNC: 19 MMOL/L (ref 23–29)
CREAT SERPL-MCNC: 2.9 MG/DL (ref 0.5–1.4)
EST. GFR  (AFRICAN AMERICAN): 25 ML/MIN/1.73 M^2
EST. GFR  (NON AFRICAN AMERICAN): 22 ML/MIN/1.73 M^2
GLUCOSE SERPL-MCNC: 237 MG/DL (ref 70–110)
POCT GLUCOSE: 267 MG/DL (ref 70–110)
POTASSIUM SERPL-SCNC: 5 MMOL/L (ref 3.5–5.1)
SODIUM SERPL-SCNC: 136 MMOL/L (ref 136–145)

## 2021-10-16 PROCEDURE — 36415 COLL VENOUS BLD VENIPUNCTURE: CPT | Performed by: STUDENT IN AN ORGANIZED HEALTH CARE EDUCATION/TRAINING PROGRAM

## 2021-10-16 PROCEDURE — 25000003 PHARM REV CODE 250: Performed by: NURSE PRACTITIONER

## 2021-10-16 PROCEDURE — G0378 HOSPITAL OBSERVATION PER HR: HCPCS

## 2021-10-16 PROCEDURE — 80048 BASIC METABOLIC PNL TOTAL CA: CPT | Performed by: STUDENT IN AN ORGANIZED HEALTH CARE EDUCATION/TRAINING PROGRAM

## 2021-10-16 RX ORDER — CARVEDILOL 3.12 MG/1
6.25 TABLET ORAL 2 TIMES DAILY
Qty: 120 TABLET | Refills: 11 | Status: SHIPPED | OUTPATIENT
Start: 2021-10-16 | End: 2022-10-16

## 2021-10-16 RX ADMIN — CLONIDINE HYDROCHLORIDE 0.1 MG: 0.1 TABLET ORAL at 08:10

## 2021-10-16 RX ADMIN — CARVEDILOL 6.25 MG: 6.25 TABLET, FILM COATED ORAL at 08:10

## 2021-10-16 RX ADMIN — ASPIRIN 81 MG CHEWABLE TABLET 81 MG: 81 TABLET CHEWABLE at 08:10

## 2021-10-16 RX ADMIN — DILTIAZEM HYDROCHLORIDE 240 MG: 120 CAPSULE, COATED, EXTENDED RELEASE ORAL at 08:10

## 2021-10-16 RX ADMIN — TAMSULOSIN HYDROCHLORIDE 0.4 MG: 0.4 CAPSULE ORAL at 08:10

## 2021-12-15 ENCOUNTER — LAB VISIT (OUTPATIENT)
Dept: LAB | Facility: HOSPITAL | Age: 66
End: 2021-12-15
Attending: UROLOGY
Payer: MEDICARE

## 2021-12-15 DIAGNOSIS — C61 PROSTATE CANCER: ICD-10-CM

## 2021-12-15 PROCEDURE — 84153 ASSAY OF PSA TOTAL: CPT | Performed by: UROLOGY

## 2021-12-15 PROCEDURE — 36415 COLL VENOUS BLD VENIPUNCTURE: CPT | Performed by: UROLOGY

## 2021-12-16 LAB — COMPLEXED PSA SERPL-MCNC: <0.01 NG/ML (ref 0–4)

## 2022-01-19 ENCOUNTER — HOSPITAL ENCOUNTER (OUTPATIENT)
Dept: RADIOLOGY | Facility: HOSPITAL | Age: 67
Discharge: HOME | End: 2022-01-19
Attending: UROLOGY
Payer: MEDICARE

## 2022-01-19 DIAGNOSIS — N13.30 HYDRONEPHROSIS, RIGHT: ICD-10-CM

## 2022-01-19 PROCEDURE — 76770 US EXAM ABDO BACK WALL COMP: CPT | Mod: 26,,, | Performed by: RADIOLOGY

## 2022-01-19 PROCEDURE — 76770 US RETROPERITONEAL COMPLETE: ICD-10-PCS | Mod: 26,,, | Performed by: RADIOLOGY

## 2022-01-19 PROCEDURE — 76770 US EXAM ABDO BACK WALL COMP: CPT | Mod: TC

## 2022-01-21 ENCOUNTER — TELEPHONE (OUTPATIENT)
Dept: UROLOGY | Facility: CLINIC | Age: 67
End: 2022-01-21
Payer: MEDICARE

## 2022-01-21 NOTE — TELEPHONE ENCOUNTER
Lt message to contact office appt for 01/26/22 will need to be david  in surgery. Okay to david next available date-gris

## 2022-02-22 ENCOUNTER — OFFICE VISIT (OUTPATIENT)
Dept: UROLOGY | Facility: CLINIC | Age: 67
End: 2022-02-22
Payer: MEDICARE

## 2022-02-22 VITALS — BODY MASS INDEX: 24.33 KG/M2 | HEIGHT: 68 IN

## 2022-02-22 DIAGNOSIS — N13.30 HYDRONEPHROSIS, RIGHT: ICD-10-CM

## 2022-02-22 DIAGNOSIS — C61 PROSTATE CANCER: ICD-10-CM

## 2022-02-22 DIAGNOSIS — N28.1 COMPLEX RENAL CYST: Primary | ICD-10-CM

## 2022-02-22 PROCEDURE — 1160F RVW MEDS BY RX/DR IN RCRD: CPT | Mod: CPTII,S$GLB,, | Performed by: UROLOGY

## 2022-02-22 PROCEDURE — 1126F AMNT PAIN NOTED NONE PRSNT: CPT | Mod: CPTII,S$GLB,, | Performed by: UROLOGY

## 2022-02-22 PROCEDURE — 3288F PR FALLS RISK ASSESSMENT DOCUMENTED: ICD-10-PCS | Mod: CPTII,S$GLB,, | Performed by: UROLOGY

## 2022-02-22 PROCEDURE — 1159F PR MEDICATION LIST DOCUMENTED IN MEDICAL RECORD: ICD-10-PCS | Mod: CPTII,S$GLB,, | Performed by: UROLOGY

## 2022-02-22 PROCEDURE — 99213 PR OFFICE/OUTPT VISIT, EST, LEVL III, 20-29 MIN: ICD-10-PCS | Mod: S$GLB,,, | Performed by: UROLOGY

## 2022-02-22 PROCEDURE — 99999 PR PBB SHADOW E&M-EST. PATIENT-LVL III: CPT | Mod: PBBFAC,,, | Performed by: UROLOGY

## 2022-02-22 PROCEDURE — 1159F MED LIST DOCD IN RCRD: CPT | Mod: CPTII,S$GLB,, | Performed by: UROLOGY

## 2022-02-22 PROCEDURE — 99213 OFFICE O/P EST LOW 20 MIN: CPT | Mod: S$GLB,,, | Performed by: UROLOGY

## 2022-02-22 PROCEDURE — 3008F PR BODY MASS INDEX (BMI) DOCUMENTED: ICD-10-PCS | Mod: CPTII,S$GLB,, | Performed by: UROLOGY

## 2022-02-22 PROCEDURE — 3288F FALL RISK ASSESSMENT DOCD: CPT | Mod: CPTII,S$GLB,, | Performed by: UROLOGY

## 2022-02-22 PROCEDURE — 1126F PR PAIN SEVERITY QUANTIFIED, NO PAIN PRESENT: ICD-10-PCS | Mod: CPTII,S$GLB,, | Performed by: UROLOGY

## 2022-02-22 PROCEDURE — 1101F PT FALLS ASSESS-DOCD LE1/YR: CPT | Mod: CPTII,S$GLB,, | Performed by: UROLOGY

## 2022-02-22 PROCEDURE — 1160F PR REVIEW ALL MEDS BY PRESCRIBER/CLIN PHARMACIST DOCUMENTED: ICD-10-PCS | Mod: CPTII,S$GLB,, | Performed by: UROLOGY

## 2022-02-22 PROCEDURE — 3008F BODY MASS INDEX DOCD: CPT | Mod: CPTII,S$GLB,, | Performed by: UROLOGY

## 2022-02-22 PROCEDURE — 99999 PR PBB SHADOW E&M-EST. PATIENT-LVL III: ICD-10-PCS | Mod: PBBFAC,,, | Performed by: UROLOGY

## 2022-02-22 PROCEDURE — 1101F PR PT FALLS ASSESS DOC 0-1 FALLS W/OUT INJ PAST YR: ICD-10-PCS | Mod: CPTII,S$GLB,, | Performed by: UROLOGY

## 2022-02-22 NOTE — PROGRESS NOTES
Subjective:       Patient ID: Filippo Martinez is a 66 y.o. male The patient's last visit with me was on 9/17/2021.     Chief Complaint:   Chief Complaint   Patient presents with    Follow-up       Urinary Retention/Hydronephrosis/Prostate Cancer  Patient admitted on 5/1/21 for uncontrolled HTN and DM. Also noted to be in UR. 750 ml of urine were scanned by bladder scanner. Of note, multiple attempts by nursing and physician staff made to place carty. Prior to this event voiding symptoms consisted of slow stream and stress incontinence. Patient required bedside dilation and carty placement by Dr Salazar. He has previous history of a robotic assisted prostatectomy in 2017 at [facility] for prostate cancer     He had a successful void trial in this office on 5/19/21.     He was later admitted to the hospital d/t fever and pain. Imaging showed R sided hydronephrosis. Subsequently he underwent emergent cystoscopy with R ureteral stent placement on 6/13/21 by Dr Salazar. Mucous plug removed at R UVJ during procedure. He had a successful void trial during admission.    He is here today for follow up. He has completed his antibiotics. He is tolerating his stent. He is voiding without difficulty.  Denies flank pain, gross hematuria or fever    BCx 6/21--+ Proteus  UCx 6/21--negative    9/17/2021  His stent was removed on 8/5/2021.  He has no complaints today.    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  Review of patient's allergies indicates:  No Known Allergies  Current Outpatient Medications   Medication Sig    aspirin 81 MG Chew Take 81 mg by mouth once daily.    blood sugar diagnostic Strp 1 each by Misc.(Non-Drug; Combo Route) route 3 (three) times daily.    blood-glucose meter kit Check blood sugar three times daily and keep a log of your results to take to your primary care provider.    carvediloL (COREG) 3.125 MG tablet Take 2 tablets (6.25 mg total) by mouth 2 (two) times daily.    cloNIDine (CATAPRES) 0.1 MG tablet clonidine HCl 0.1 mg tablet   TAKE 1 TABLET BY MOUTH TWICE DAILY    diltiaZEM (TIAZAC) 240 MG Cs24 Take 240 mg by mouth once daily.    ferrous gluconate (FERGON) 324 MG tablet Take 1 tablet (324 mg total) by mouth 2 (two) times daily before meals.    multivit-min-FA-lycopen-lutein (CENTRUM SILVER MEN) 300-600-300 mcg Tab Take 1 tablet by mouth once daily.    ondansetron (ZOFRAN-ODT) 8 MG TbDL Take 1 tablet (8 mg total) by mouth every 8 (eight) hours as needed (nausea).    pravastatin (PRAVACHOL) 40 MG tablet Take 40 mg by mouth once daily.    pulse oximeter (PULSE OXIMETER) device by Apply Externally route 2 (two) times a day. Use twice daily at 8 AM and 3 PM and [facility].    senna-docusate 8.6-50 mg (PERICOLACE) 8.6-50 mg per tablet Take 1 tablet by mouth 2 (two) times daily as needed for Constipation.    TRADJENTA 5 mg Tab tablet     albuterol (PROVENTIL/VENTOLIN HFA) 90 mcg/actuation inhaler Inhale 2 puffs into the lungs every 6 (six) hours as needed for Wheezing.     "tamsulosin (FLOMAX) 0.4 mg Cap Take 1 capsule (0.4 mg total) by mouth once daily. (Patient not taking: No sig reported)     No current facility-administered medications for this visit.       Review of Systems   Constitutional: Negative for activity change, chills, fatigue, fever and unexpected weight change.   Eyes: Negative for discharge, redness and visual disturbance.   Respiratory: Negative for cough, shortness of breath and wheezing.    Cardiovascular: Negative for chest pain and leg swelling.   Gastrointestinal: Negative for abdominal distention, abdominal pain, constipation, diarrhea, nausea and vomiting.   Genitourinary: Negative for decreased urine volume, difficulty urinating, dysuria, flank pain, frequency, hematuria, penile discharge, scrotal swelling, testicular pain and urgency.   Musculoskeletal: Negative for arthralgias, joint swelling and myalgias.   Skin: Negative for color change and rash.   Neurological: Negative for dizziness and light-headedness.   Psychiatric/Behavioral: Negative for behavioral problems and confusion. The patient is not nervous/anxious.        Objective:      Vitals:    02/22/22 1450   Height: 5' 8" (1.727 m)     Physical Exam  Vitals and nursing note reviewed.   Constitutional:       Appearance: He is well-developed.   HENT:      Head: Normocephalic.   Eyes:      Conjunctiva/sclera: Conjunctivae normal.   Neck:      Thyroid: No thyromegaly.      Trachea: No tracheal deviation.   Cardiovascular:      Rate and Rhythm: Normal rate.      Heart sounds: Normal heart sounds.   Pulmonary:      Effort: Pulmonary effort is normal. No respiratory distress.      Breath sounds: Normal breath sounds. No wheezing.   Abdominal:      General: Bowel sounds are normal.      Palpations: Abdomen is soft.      Tenderness: There is no abdominal tenderness. There is no rebound.      Hernia: No hernia is present.   Musculoskeletal:         General: No tenderness. Normal range of motion.      Cervical " back: Normal range of motion and neck supple.   Lymphadenopathy:      Cervical: No cervical adenopathy.   Skin:     General: Skin is warm and dry.      Findings: No erythema or rash.   Neurological:      Mental Status: He is alert and oriented to person, place, and time.   Psychiatric:         Behavior: Behavior normal.         Thought Content: Thought content normal.         Judgment: Judgment normal.         Urine dipstick shows negative for all components.  Micro exam: negative for WBC's or RBC's.     PSA Diagnostic 0.00 - 4.00 ng/mL <0.01    Comment: PSA Expected levels:   Hormonal Therapy: <0.05 ng/ml   Prostatectomy: <0.01 ng/ml   Radiation Therapy: <1.00 ng/ml    Resulting Agency  [facility]             Narrative  Performed by: [facility]  PSA 3 months      Specimen Collected: 12/15/21 12:52 Last Resulted: 12/16/21 11:44           US Retroperitoneal Complete (Kidney and  Order: [id number]   Status: Final result     Visible to patient: No (inaccessible in Patient Portal)     Next appt: Today at 02:45 PM in Urology (YARI Salazar MD)     Dx: Hydronephrosis, right     0 Result Notes    Details    Reading Physician Reading Date Result Priority   Chapo Braden MD  [phone]  [phone] 1/19/2022 Routine     Narrative & Impression  EXAMINATION:  US RETROPERITONEAL COMPLETE     CLINICAL HISTORY:  Unspecified hydronephrosis     TECHNIQUE:  Ultrasound of the kidneys and urinary bladder was performed including color flow and Doppler evaluation of the kidneys.     COMPARISON:  10/14/2021.     FINDINGS:  Right kidney: The right kidney measures 11.3 x 4.1 x 6.1 cm. No cortical thinning. No loss of corticomedullary distinction. Resistive index measures 0.75.  There is a minimally complex right renal cyst present measuring 2.0 x 1.8 x 1.6 cm containing a thin internal septation (previously 1.5 x 1.7 x 1.6 cm).  No renal stone. No hydronephrosis.     Left kidney: The left kidney measures 11.1 x 4.3 x 5.5 cm. No cortical  thinning. No loss of corticomedullary distinction. Resistive index measures 0.79.  There is a left lower pole simple renal cyst present measuring 2.0 x 1.4 x 1.5 cm.  No renal stone. No hydronephrosis.     The bladder is partially distended at the time of scanning and has an unremarkable appearance.  Prevoid bladder measurements suggest a prevoid bladder volume of 114 mL.  Following voiding, no significant postvoid residual is evident.     Impression:     Minimally complex right renal cyst.  While the measurements are slightly larger than on the prior study dated 10/14/2021, measurements of this minimally complex renal cyst are slightly smaller than on the prior examination dated 05/01/2021.     Left lower pole simple renal cyst.     Elevated resistive indices within the kidneys bilaterally.  This is nonspecific but can be seen with medical renal disease.        Electronically signed by: Chapo Braden MD  Date:                                            01/19/2022  Time:                                           15:21             Exam Ended: 01/19/22 15:00               Assessment:       1. Complex renal cyst    2. Prostate cancer    3. Hydronephrosis, right          Plan:       1. Complex renal cyst  Monitor for size change, may consider an MRI at some point  - US Retroperitoneal Complete (Kidney and; Future    2. Prostate cancer  stable    3. Hydronephrosis, right  resolved             Follow up in about 6 months (around 8/22/2022) for Follow up.

## 2023-07-11 ENCOUNTER — OFFICE VISIT (OUTPATIENT)
Dept: PODIATRY | Facility: CLINIC | Age: 68
End: 2023-07-11
Payer: MEDICARE

## 2023-07-11 VITALS — BODY MASS INDEX: 24.26 KG/M2 | WEIGHT: 160.06 LBS | HEIGHT: 68 IN

## 2023-07-11 DIAGNOSIS — M20.42 HAMMER TOES OF BOTH FEET: ICD-10-CM

## 2023-07-11 DIAGNOSIS — E11.9 COMPREHENSIVE DIABETIC FOOT EXAMINATION, TYPE 2 DM, ENCOUNTER FOR: Primary | ICD-10-CM

## 2023-07-11 DIAGNOSIS — B35.3 TINEA PEDIS OF BOTH FEET: ICD-10-CM

## 2023-07-11 DIAGNOSIS — M20.5X1 HALLUX LIMITUS, ACQUIRED, RIGHT: ICD-10-CM

## 2023-07-11 DIAGNOSIS — R20.2 PARESTHESIA OF LEFT FOOT: ICD-10-CM

## 2023-07-11 DIAGNOSIS — M20.5X2 HALLUX LIMITUS, ACQUIRED, LEFT: ICD-10-CM

## 2023-07-11 DIAGNOSIS — M20.41 HAMMER TOES OF BOTH FEET: ICD-10-CM

## 2023-07-11 DIAGNOSIS — B35.1 ONYCHOMYCOSIS DUE TO DERMATOPHYTE: ICD-10-CM

## 2023-07-11 PROCEDURE — 1159F MED LIST DOCD IN RCRD: CPT | Mod: CPTII,S$GLB,, | Performed by: PODIATRIST

## 2023-07-11 PROCEDURE — 1160F PR REVIEW ALL MEDS BY PRESCRIBER/CLIN PHARMACIST DOCUMENTED: ICD-10-PCS | Mod: CPTII,S$GLB,, | Performed by: PODIATRIST

## 2023-07-11 PROCEDURE — 1160F RVW MEDS BY RX/DR IN RCRD: CPT | Mod: CPTII,S$GLB,, | Performed by: PODIATRIST

## 2023-07-11 PROCEDURE — 1101F PR PT FALLS ASSESS DOC 0-1 FALLS W/OUT INJ PAST YR: ICD-10-PCS | Mod: CPTII,S$GLB,, | Performed by: PODIATRIST

## 2023-07-11 PROCEDURE — 1126F AMNT PAIN NOTED NONE PRSNT: CPT | Mod: CPTII,S$GLB,, | Performed by: PODIATRIST

## 2023-07-11 PROCEDURE — 99999 PR PBB SHADOW E&M-EST. PATIENT-LVL III: ICD-10-PCS | Mod: PBBFAC,,, | Performed by: PODIATRIST

## 2023-07-11 PROCEDURE — 99999 PR PBB SHADOW E&M-EST. PATIENT-LVL III: CPT | Mod: PBBFAC,,, | Performed by: PODIATRIST

## 2023-07-11 PROCEDURE — 3288F PR FALLS RISK ASSESSMENT DOCUMENTED: ICD-10-PCS | Mod: CPTII,S$GLB,, | Performed by: PODIATRIST

## 2023-07-11 PROCEDURE — 4010F ACE/ARB THERAPY RXD/TAKEN: CPT | Mod: CPTII,S$GLB,, | Performed by: PODIATRIST

## 2023-07-11 PROCEDURE — 1159F PR MEDICATION LIST DOCUMENTED IN MEDICAL RECORD: ICD-10-PCS | Mod: CPTII,S$GLB,, | Performed by: PODIATRIST

## 2023-07-11 PROCEDURE — 99203 PR OFFICE/OUTPT VISIT, NEW, LEVL III, 30-44 MIN: ICD-10-PCS | Mod: S$GLB,,, | Performed by: PODIATRIST

## 2023-07-11 PROCEDURE — 1126F PR PAIN SEVERITY QUANTIFIED, NO PAIN PRESENT: ICD-10-PCS | Mod: CPTII,S$GLB,, | Performed by: PODIATRIST

## 2023-07-11 PROCEDURE — 3008F PR BODY MASS INDEX (BMI) DOCUMENTED: ICD-10-PCS | Mod: CPTII,S$GLB,, | Performed by: PODIATRIST

## 2023-07-11 PROCEDURE — 99213 OFFICE O/P EST LOW 20 MIN: CPT | Mod: PBBFAC,PO | Performed by: PODIATRIST

## 2023-07-11 PROCEDURE — 99203 OFFICE O/P NEW LOW 30 MIN: CPT | Mod: S$GLB,,, | Performed by: PODIATRIST

## 2023-07-11 PROCEDURE — 1101F PT FALLS ASSESS-DOCD LE1/YR: CPT | Mod: CPTII,S$GLB,, | Performed by: PODIATRIST

## 2023-07-11 PROCEDURE — 3008F BODY MASS INDEX DOCD: CPT | Mod: CPTII,S$GLB,, | Performed by: PODIATRIST

## 2023-07-11 PROCEDURE — 3288F FALL RISK ASSESSMENT DOCD: CPT | Mod: CPTII,S$GLB,, | Performed by: PODIATRIST

## 2023-07-11 PROCEDURE — 4010F PR ACE/ARB THEARPY RXD/TAKEN: ICD-10-PCS | Mod: CPTII,S$GLB,, | Performed by: PODIATRIST

## 2023-07-11 NOTE — PROGRESS NOTES
Subjective:      Patient ID: Filippo Martinez is a 68 y.o. male.    Chief Complaint: Diabetes Mellitus, Diabetic Foot Exam (6/8/23 Dr Mas), and Nail Care    Filippo is a 68 y.o. male who presents to the clinic upon referral from Dr. Mills  for evaluation and treatment of diabetic feet. Filippo has a past medical history of Diabetes mellitus, type 2, Hyperlipidemia, Hypertension (2004), and Prostate cancer. The patient has no major complaints with feet. Chief concern is how to care for feet as a diabetic.    He does relate a single episode of nocturnal tingling to the left foot last week    PCP: Rohan White MD    Date Last Seen by PCP:   Chief Complaint   Patient presents with    Diabetes Mellitus    Diabetic Foot Exam     6/8/23 Dr Mas    Nail Care     Current shoe gear: willy crowder boat shoes    Hemoglobin A1C   Date Value Ref Range Status   10/14/2021 7.5 (H) 4.0 - 5.6 % Final     Comment:     ADA Screening Guidelines:  5.7-6.4%  Consistent with prediabetes  >or=6.5%  Consistent with diabetes    High levels of fetal hemoglobin interfere with the HbA1C  assay. Heterozygous hemoglobin variants (HbS, HgC, etc)do  not significantly interfere with this assay.   However, presence of multiple variants may affect accuracy.     05/01/2021 7.9 (H) 4.0 - 5.6 % Final     Comment:     ADA Screening Guidelines:  5.7-6.4%  Consistent with prediabetes  >or=6.5%  Consistent with diabetes    High levels of fetal hemoglobin interfere with the HbA1C  assay. Heterozygous hemoglobin variants (HbS, HgC, etc)do  not significantly interfere with this assay.   However, presence of multiple variants may affect accuracy.     08/20/2020 7.3 (H) 4.0 - 5.6 % Final     Comment:     ADA Screening Guidelines:  5.7-6.4%  Consistent with prediabetes  >or=6.5%  Consistent with diabetes  High levels of fetal hemoglobin interfere with the HbA1C  assay. Heterozygous hemoglobin variants (HbS, HgC, etc)do  not significantly interfere with this  assay.   However, presence of multiple variants may affect accuracy.                 Patient Active Problem List   Diagnosis    Hyperlipidemia    Type 2 diabetes mellitus with hyperglycemia, without long-term current use of insulin    Gastroesophageal reflux disease without esophagitis    Acute hypoxemic respiratory failure    Anemia in stage 4 chronic kidney disease    Malignant renovascular hypertension requiring acute intensive management    CKD (chronic kidney disease), stage IV    Hx urinary retention    Acute renal failure superimposed on stage 4 chronic kidney disease    Hyperkalemia    Acute urinary obstruction    History of robot-assisted laparoscopic radical prostatectomy    TIA (transient ischemic attack)    Severe sepsis    Encephalopathy, metabolic    Metabolic acidosis    Transaminitis    Hydronephrosis    Lactic acidosis    Gram-negative bacteremia    High potassium       Current Outpatient Medications on File Prior to Visit   Medication Sig Dispense Refill    aspirin 81 MG Chew Take 81 mg by mouth once daily.      blood sugar diagnostic Strp 1 each by Misc.(Non-Drug; Combo Route) route 3 (three) times daily. 90 each 2    cloNIDine (CATAPRES) 0.1 MG tablet clonidine HCl 0.1 mg tablet   TAKE 1 TABLET BY MOUTH TWICE DAILY      diltiaZEM (TIAZAC) 240 MG Cs24 Take 240 mg by mouth once daily.      ferrous gluconate (FERGON) 324 MG tablet Take 1 tablet (324 mg total) by mouth 2 (two) times daily before meals. 120 tablet 2    multivit-min-FA-lycopen-lutein (CENTRUM SILVER MEN) 300-600-300 mcg Tab Take 1 tablet by mouth once daily. 90 tablet 3    ondansetron (ZOFRAN-ODT) 8 MG TbDL Take 1 tablet (8 mg total) by mouth every 8 (eight) hours as needed (nausea). 30 tablet 1    pravastatin (PRAVACHOL) 40 MG tablet Take 40 mg by mouth once daily.      pulse oximeter (PULSE OXIMETER) device by Apply Externally route 2 (two) times a day. Use twice daily at 8 AM and 3 PM and record the value in Hobby as directed. 1  each 0    senna-docusate 8.6-50 mg (PERICOLACE) 8.6-50 mg per tablet Take 1 tablet by mouth 2 (two) times daily as needed for Constipation. 60 tablet 0    TRADJENTA 5 mg Tab tablet       albuterol (PROVENTIL/VENTOLIN HFA) 90 mcg/actuation inhaler Inhale 2 puffs into the lungs every 6 (six) hours as needed for Wheezing. 18 g 0    blood-glucose meter kit Check blood sugar three times daily and keep a log of your results to take to your primary care provider. 1 each 0    carvediloL (COREG) 3.125 MG tablet Take 2 tablets (6.25 mg total) by mouth 2 (two) times daily. 120 tablet 11    tamsulosin (FLOMAX) 0.4 mg Cap Take 1 capsule (0.4 mg total) by mouth once daily. (Patient not taking: No sig reported) 30 capsule 1     No current facility-administered medications on file prior to visit.       Review of patient's allergies indicates:  No Known Allergies    Past Surgical History:   Procedure Laterality Date    CATARACT EXTRACTION W/  INTRAOCULAR LENS IMPLANT Right 07/24/2020    CATARACT EXTRACTION W/  INTRAOCULAR LENS IMPLANT Left 08/04/2020    CYSTOSCOPY W/ URETERAL STENT PLACEMENT N/A 6/13/2021    Procedure: CYSTOSCOPY, WITH URETERAL STENT INSERTION;  Surgeon: APOORVA Salazar MD;  Location: [facility];  Service: Urology;  Laterality: N/A;    PROSTATECTOMY  05/19/2017       Family History   Problem Relation Age of Onset    Diabetes Mother     Hyperlipidemia Mother     Diabetes Father     Hyperlipidemia Father     Asthma Father        Social History     Socioeconomic History    Marital status:    Tobacco Use    Smoking status: Never    Smokeless tobacco: Never   Substance and Sexual Activity    Alcohol use: No    Drug use: No    Sexual activity: Yes     Partners: Female       Review of Systems   Constitutional: Negative for chills and fever.   Cardiovascular:  Negative for claudication and leg swelling.   Respiratory:  Negative for cough and shortness of breath.    Skin:  Positive for nail changes. Negative for dry  "skin, itching and rash.   Musculoskeletal:  Positive for joint pain, myalgias and stiffness. Negative for falls, joint swelling and muscle weakness.   Gastrointestinal:  Negative for diarrhea, nausea and vomiting.   Neurological:  Negative for numbness, paresthesias, tremors and weakness.   Psychiatric/Behavioral:  Negative for altered mental status and hallucinations.          Objective:      Vitals:    07/11/23 1141   Weight: 72.6 kg (160 lb 0.9 oz)   Height: 5' 8" (1.727 m)   PainSc: 0-No pain       Physical Exam  Vitals and nursing note reviewed.   Constitutional:       General: He is not in acute distress.     Appearance: He is well-developed. He is not toxic-appearing or diaphoretic.      Comments: alert and oriented x 3.    Cardiovascular:      Pulses:           Dorsalis pedis pulses are 2+ on the right side and 2+ on the left side.        Posterior tibial pulses are 2+ on the right side and 2+ on the left side.      Comments:  Capillary refill time is within normal limits. Digital hair present.   Pulmonary:      Effort: No respiratory distress.   Musculoskeletal:         General: No deformity.      Right ankle: No tenderness. No lateral malleolus, medial malleolus, AITF ligament, CF ligament or posterior TF ligament tenderness.      Right Achilles Tendon: No defects. Terrell's test negative.      Left ankle: No tenderness. No lateral malleolus, medial malleolus, AITF ligament, CF ligament or posterior TF ligament tenderness.      Left Achilles Tendon: No defects. Terrell's test negative.      Right foot: No tenderness or bony tenderness.      Left foot: No tenderness or bony tenderness.      Comments: There is equinus deformity bilateral with decreased dorsiflexion at the ankle joint bilateral    Decreased first MPJ range of motion both weightbearing and nonweightbearing, no crepitus observed the first MP joint, + dorsal flag sign. Mild  bunion deformity is observed .    Patient has hammertoes of digits " 2-5 bilateral partially reducible      Muscle strength is 5/5 in all groups bilaterally.           Feet:      Right foot:      Protective Sensation: 5 sites tested.  5 sites sensed.      Skin integrity: Skin integrity normal.      Left foot:      Protective Sensation: 5 sites tested.  5 sites sensed.      Skin integrity: Skin integrity normal.   Lymphadenopathy:      Comments: No lymphatic streaking     Skin:     General: Skin is warm and dry.      Coloration: Skin is not pale.      Findings: No rash.      Nails: There is no clubbing.      Comments: Skin is of normal turgor.   Normal temperature gradient.  Examination of the skin reveals no evidence of significant rashes, open lesions, suspicious appearing nevi or other concerning lesions.     Toenails 1-5 bilaterally are thickened by 2-3 mm, discolored/yellowed, dystrophic, brittle with subungual debris.    3rd and 4th interdigital maceration ryan    Scaling dryness in a moccasin distribution is noted to the bilateral lower extremities without associated erythema.               Neurological:      Sensory: No sensory deficit.      Motor: No atrophy.      Comments: Light touch present     Psychiatric:         Attention and Perception: He is attentive.         Mood and Affect: Mood is not anxious. Affect is not inappropriate.         Speech: He is communicative. Speech is not slurred.         Behavior: Behavior is not combative.             Assessment:       Encounter Diagnoses   Name Primary?    Comprehensive diabetic foot examination, type 2 DM, encounter for Yes    Paresthesia of left foot     Hammer toes of both feet     Hallux limitus, acquired, right     Hallux limitus, acquired, left     Onychomycosis due to dermatophyte     Tinea pedis of both feet          Plan:     Problem List Items Addressed This Visit    None  Visit Diagnoses       Comprehensive diabetic foot examination, type 2 DM, encounter for    -  Primary    Relevant Orders    DIABETIC SHOES FOR HOME  USE    Paresthesia of left foot        Hammer toes of both feet        Relevant Orders    DIABETIC SHOES FOR HOME USE    Hallux limitus, acquired, right        Relevant Orders    DIABETIC SHOES FOR HOME USE    Hallux limitus, acquired, left        Relevant Orders    DIABETIC SHOES FOR HOME USE    Onychomycosis due to dermatophyte        Tinea pedis of both feet               I counseled the patient on his conditions, their implications and medical management.    Orders Placed This Encounter    DIABETIC SHOES FOR HOME USE       Education about the diabetic foot, neuropathy, and prevention of limb loss.    Shoe inspection. Diabetic Foot Education. Patient reminded of the importance of good nutrition/healthy diet/weight management and blood sugar control to help prevent podiatric complications of diabetes. Patient instructed on proper foot hygeine. Wear comfortable, proper fitting shoes. Wash feet daily. Dry well. After drying, apply moisturizer to feet (no lotion to webspaces). Inspect feet daily for skin breaks, blisters, swelling, or redness. Wear cotton socks (preferably white)  Change socks every day. Do NOT walk barefoot. Do NOT use heating pads or hot water soaks. We discussed wearing proper shoe gear, daily foot inspections, never walking without protective shoe gear.     Discussed edema control and the importance of daily moisturizer to the feet such as Gold bonds diabetic foot cream    Recommend applying vicks vaporub to thick abnormal toenails daily x 6 months to treat fungal nail infection.    rx diabetic shoes for protection and support     Although patient does have chronic kidney disease in the presence of diabetes, clinically he has intact pedal pulses and intact protective sensation and therefore does not qualify for foot care. Patients who qualify for foot  require appropriate modifiers that indicate high amputation risk (evidence of decreased perfusion, previous amputation, loss of protective  sensation,etc). Therefore patient is low risk for developing lower extremity issues secondary to diabetes. I recommend continued yearly diabetic foot examinations. Patients without pedal manifestations of DM, do not qualify for nail/callus trimming      He will continue to monitor the areas daily, inspect his feet, wear protective shoe gear when ambulatory, moisturizer to maintain skin integrity and follow in this office in approximately  12 months, sooner p.r.n.

## 2023-07-11 NOTE — PATIENT INSTRUCTIONS
Athletes Foot     Athletes Foot is caused by a fungal infection in the skin. It affects the skin between the toes where it causes fissures (cracks in the skin). It can also affect the bottom of the foot where it causes dry white scales and peeling of the skin. This infection is more likely to occur when the foot is in hot, sweaty socks and shoes for long periods of time.   This infection is treated with skin creams or oral medicine.     Home Care:   It is important to keep the feet dry. Use absorbent cotton socks and change them if they become sweaty; or wear an open-toe shoe or sandal. Wash the feet at least once a day with soap and water.   Rotate your shoes. If you must wear the same shoes everyday then spray the shoes with lysol or antifungal spray and allow that to dry overnight before wearing the shoes again  Apply OTC antifungal spray twice daily for 10 weeks to entire foot and between toes. Some antifungal creams are available without a prescription (Lotrimin, Tinactin).   It may take a week before the rash starts to improve and it can take about three to four weeks to completely clear. Continue the medicine until the rash is all gone.   Use over-the-counter antifungal powders or sprays on your feet after exposure to high-risk environments (public showers, gyms and locker rooms) may prevent future infections. You may wish to use appropriate footwear to reduce exposure.  Clean tubs and bathroom floor with bleach  Clean feet with Nizoral shampoo or dial antibacterial soap and then dry completely.    Follow Up   with your doctor as recommended by our staff if the rash is not starting to improve after TEN days of treatment, or if the rash continues to spread.     Get Prompt Medical Attention   if any of the following occur:   Increasing redness or swelling of the foot   Pus draining from cracks in the skin   Fever of 100.4ºF (38ºC) or higher, or as directed by your healthcare provider    © 7424-5643 [employer]  [facility], [address] [place] [address]. All rights reserved. This information is not intended as a substitute for professional medical care. Always follow your healthcare professional's instructions.       Wearing Proper Shoes                    You walk on your feet every day, forcing them to support the weight of your body. Repeated stress on your feet can cause damage over time. The right shoes can help protect your feet. The wrong shoes can cause more foot problems. Read the information below to help you find a shoe that fits your foot needs.     A good shoe fit will cover your foot outline.       A shoe that doesnt cover the outline is a bad fit.      Whats your foot shape?  To get a good fit, you need to know the shape of your foot. Do this simple test: While standing, place your foot on a piece of paper and trace around it. Is your foot straight or curved? Do you have a foot problem, such as a bunion, that causes your foot outline to show a bulge on the side of your big toe?  Finding your fit  Bring your foot outline to the shoe store to help you find the right shoe. Place a shoe you like on top of the outline to see if it matches the shape. The shoe should cover the outline. (If you have a bunion, the shoe may not cover the bulge on the outline. Look for soft leather shoes to stretch over the bunion.) Once youve found a pair of proper shoes, put them on. Walk around. Be sure the shoes dont rub or pinch. If the shoes feel good, youve found your fit!  The right shoe for you  A good shoe has features that provide comfort and support. It must also be the right size and shape for your feet. Look for a shoe made of breathable fabric and lining, such as leather or canvas. Be sure that shoes have enough tread to prevent slipping. Go to a good shoe store for help finding the right shoe.  Good shoe features  An ideal shoe has the following:  Laces for support. If tying laces is a problem for you, try  shoes with Velcro fasteners or vahid.  A front of the shoe (toe box) with ½ inch space in front of your longest toes.  An arch shape that supports your foot.  No more than 1½ inches of heel.  A stiff, snug back of the shoe to keep your foot from sliding around.  A smooth lining with no rough seams.  Shoe shopping tips  Below are some dos and donts for when you go to the shoe store.  Do:  Select the shoes that feel right. Wear them around the house. Then bring them to your foot healthcare provider to check for fit. If they dont fit well, return them.  Shop late in the day, when your feet will be slightly bigger.  Each time you buy shoes, have both your feet measured while you are standing. Foot size changes with time.  Pick shoes to suit their purpose. High heels are OK for an occasional night on the town. But for everyday wear, choose a more sensible shoe.  Try on shoes while wearing any inserts specially made for your feet (orthoses).  Try on both the right and left shoes. If your feet are different sizes, pick a pair that fits the larger foot.  Dont:  Dont buy shoes based on shoe size alone. Always try on shoes, as sizes differ from brand to brand and within brands.  Dont expect shoes to break in. If they dont fit at the store, dont buy them.  Dont buy a shoe that doesnt match your foot shape.  What about socks?  Always wear socks with shoes. Socks help absorb sweat and reduce friction and blistering. When shopping for shoes, choose soft, padded socks with seams that dont irritate your feet.  If you have foot problems  Some foot problems cause deformities. This can make it hard to find a good fit. Look for shoes made of soft leather to stretch over the deformity. If you have bunions, buy shoes with a wider toe box. To fit hammertoes, look for shoes with a tall toe box. If you have arch problems, you may need inserts. In some cases, youll need to have custom footwear or orthoses made for your  feet.  Suggested footwear  Ask your healthcare provider what kind of footwear you need. He or she may recommend a certain brand or shoe store.  Date Last Reviewed: 8/1/2016 © 2000-2016 [employer]. [address] [place] [address], [place] [address]. All rights reserved. This information is not intended as a substitute for professional medical care. Always follow your healthcare professional's instructions.  Over the counter pain creams: Voltaren Gel, Biofreeze, Bengay, tiger balm, two old goat, lidocaine gel,  Absorbine Veterinary Liniment Gel Topical Analgesic Sore Muscle and Joint Pain Relief  Recommend lotions: eucerin, eucerin for diabetics, aquaphor, A&D ointment, gold bond for diabetics, sween, [place]'s Bees all purpose baby ointment,  urea 40 with aloe or SkinIntegra rapid crack repair (found on amazon.com)  Shoe recommendations: (try 6pm.wildcraft, [employer]os[employer]on [employer] , nordstromraSunPods, or shoes.wildcraft for discounted prices) you can visit varsity shoes in [place], DSW shoes in [place]  or cindi rack in the [facility] (there are also several shoe brand outlets [place] [facility] [place]  ONLY purchase stability style tennis shoes NO flex, foam, free, yoga mat style shoes  Asics (GT 4000 or gel foundations), new balance stability type shoes (such as the 940 series), saucony (stabil c3),  Stock (GTS or Beast or transcend), propet, HokaOne (tennis shoe) Dario (tennis shoes and boots)  Kenyaft Ramiro (women) Neha&Maverick (men), clarks, crocs, aerosoles, naturalizers, SAS, ecco, born, julissa kennedy, samuel (dress shoes)  Vionic, burkenstocks, fitflops, propet, taos, baretraps (sandals)  HokaOne sandals, crocs, propet (house shoes)    Nail Home remedy:  Vicks Vapor rub or Emuaid to nails for easier manageability    Diabetes: Inspecting Your Feet  Diabetes increases your chances of developing foot problems. So inspect your feet every day. This helps you find small skin irritations before they become serious infections. If you  have trouble seeing the bottoms of your feet, use a mirror or ask a family member or friend to help.     Pressure spots on the bottom of the foot are common areas where problems develop.   How to check your feet  Below are tips to help you look for foot problems. Try to check your feet at the same time each day, such as when you get out of bed in the morning:  Check the top of each foot. The tops of toes, back of the heel, and outer edge of the foot can get a lot of rubbing from poor-fitting shoes.  Check the bottom of each foot. Daily wear and tear often leads to problems at pressure spots.  Check the toes and nails. Fungal infections often occur between toes. Toenail problems can also be a sign of fungal infections or lead to breaks in the skin.  Check your shoes, too. Loose objects inside a shoe can injure the foot. Use your hand to feel inside your shoes for things like leela, loose stitching, or rough areas that could irritate your skin.  Warning signs  Look for any color changes in the foot. Redness with streaks can signal a severe infection, which needs immediate medical attention. Tell your doctor right away if you have any of these problems:  Swelling, sometimes with color changes, may be a sign of poor blood flow or infection. Symptoms include tenderness and an increase in the size of your foot.  Warm or hot areas on your feet may be signs of infection. A foot that is cold may not be getting enough blood.  Sensations such as burning, tingling, or pins and needles can be signs of a problem. Also check for areas that may be numb.  Hot spots are caused by friction or pressure. Look for hot spots in areas that get a lot of rubbing. Hot spots can turn into blisters, calluses, or sores.  Cracks and sores are caused by dry or irritated skin. They are a sign that the skin is breaking down, which can lead to infection.  Toenail problems to watch for include nails growing into the skin (ingrown toenail) and  causing redness or pain. Thick, yellow, or discolored nails can signal a fungal infection.  Drainage and odor can develop from untreated sores and ulcers. Call your doctor right away if you notice white or yellow drainage, bleeding, or unpleasant odor.   © 1108-9046 [employer]. [address] [place] [address]. All rights reserved. This information is not intended as a substitute for professional medical care. Always follow your healthcare professional's instructions.        Step-by-Step:  Inspecting Your Feet (Diabetes)    Date Last Reviewed: 10/1/2016  © 8891-1509 [employer]. [address] [place] [address]. All rights reserved. This information is not intended as a substitute for professional medical care. Always follow your healthcare professional's instructions.

## 2024-01-02 ENCOUNTER — HOSPITAL ENCOUNTER (EMERGENCY)
Facility: HOSPITAL | Age: 69
Discharge: LEFT BEFORE DISCHARGE | End: 2024-01-02
Payer: MEDICARE

## 2024-01-02 VITALS
WEIGHT: 160 LBS | DIASTOLIC BLOOD PRESSURE: 93 MMHG | TEMPERATURE: 98 F | BODY MASS INDEX: 24.25 KG/M2 | HEIGHT: 68 IN | OXYGEN SATURATION: 99 % | HEART RATE: 92 BPM | RESPIRATION RATE: 20 BRPM | SYSTOLIC BLOOD PRESSURE: 151 MMHG

## 2024-01-02 LAB — POCT GLUCOSE: 229 MG/DL (ref 70–110)

## 2024-01-02 PROCEDURE — 99282 EMERGENCY DEPT VISIT SF MDM: CPT | Mod: ER

## 2024-01-02 PROCEDURE — 82962 GLUCOSE BLOOD TEST: CPT | Mod: ER

## 2024-05-15 ENCOUNTER — TELEPHONE (OUTPATIENT)
Dept: TRANSPLANT | Facility: CLINIC | Age: 69
End: 2024-05-15
Payer: MEDICARE

## 2024-05-20 ENCOUNTER — TELEPHONE (OUTPATIENT)
Dept: TRANSPLANT | Facility: CLINIC | Age: 69
End: 2024-05-20
Payer: MEDICARE

## 2024-06-03 ENCOUNTER — TELEPHONE (OUTPATIENT)
Dept: TRANSPLANT | Facility: CLINIC | Age: 69
End: 2024-06-03
Payer: MEDICARE

## 2024-06-05 ENCOUNTER — TELEPHONE (OUTPATIENT)
Dept: TRANSPLANT | Facility: CLINIC | Age: 69
End: 2024-06-05
Payer: MEDICARE

## 2024-06-26 ENCOUNTER — TELEPHONE (OUTPATIENT)
Dept: TRANSPLANT | Facility: CLINIC | Age: 69
End: 2024-06-26
Payer: MEDICARE

## 2024-08-08 ENCOUNTER — TELEPHONE (OUTPATIENT)
Dept: TRANSPLANT | Facility: CLINIC | Age: 69
End: 2024-08-08
Payer: MEDICARE

## 2024-08-09 DIAGNOSIS — Z91.89 CARDIOVASCULAR EVENT RISK: ICD-10-CM

## 2024-08-09 DIAGNOSIS — N18.6 END STAGE RENAL DISEASE: Primary | ICD-10-CM

## 2024-08-09 DIAGNOSIS — Z76.82 ORGAN TRANSPLANT CANDIDATE: ICD-10-CM

## 2024-08-16 ENCOUNTER — TELEPHONE (OUTPATIENT)
Dept: TRANSPLANT | Facility: CLINIC | Age: 69
End: 2024-08-16
Payer: MEDICARE

## 2024-08-21 ENCOUNTER — TELEPHONE (OUTPATIENT)
Dept: TRANSPLANT | Facility: CLINIC | Age: 69
End: 2024-08-21
Payer: MEDICARE

## 2024-08-29 ENCOUNTER — CLINICAL SUPPORT (OUTPATIENT)
Dept: INFECTIOUS DISEASES | Facility: CLINIC | Age: 69
End: 2024-08-29
Payer: MEDICARE

## 2024-08-29 ENCOUNTER — OFFICE VISIT (OUTPATIENT)
Dept: TRANSPLANT | Facility: CLINIC | Age: 69
End: 2024-08-29
Payer: MEDICARE

## 2024-08-29 ENCOUNTER — HOSPITAL ENCOUNTER (OUTPATIENT)
Dept: RADIOLOGY | Facility: HOSPITAL | Age: 69
Discharge: HOME | End: 2024-08-29
Payer: MEDICARE

## 2024-08-29 VITALS
OXYGEN SATURATION: 100 % | SYSTOLIC BLOOD PRESSURE: 206 MMHG | WEIGHT: 151.69 LBS | HEART RATE: 93 BPM | RESPIRATION RATE: 18 BRPM | BODY MASS INDEX: 24.38 KG/M2 | DIASTOLIC BLOOD PRESSURE: 96 MMHG | HEIGHT: 66 IN | TEMPERATURE: 97 F

## 2024-08-29 DIAGNOSIS — Z85.46 HISTORY OF PROSTATE CANCER: ICD-10-CM

## 2024-08-29 DIAGNOSIS — Z76.82 ORGAN TRANSPLANT CANDIDATE: ICD-10-CM

## 2024-08-29 DIAGNOSIS — N18.6 END STAGE RENAL DISEASE: ICD-10-CM

## 2024-08-29 DIAGNOSIS — E11.65 TYPE 2 DIABETES MELLITUS WITH HYPERGLYCEMIA, WITHOUT LONG-TERM CURRENT USE OF INSULIN: Chronic | ICD-10-CM

## 2024-08-29 DIAGNOSIS — Z01.818 PRE-TRANSPLANT EVALUATION FOR KIDNEY TRANSPLANT: Primary | ICD-10-CM

## 2024-08-29 DIAGNOSIS — Z01.818 PRE-TRANSPLANT EVALUATION FOR KIDNEY TRANSPLANT: ICD-10-CM

## 2024-08-29 DIAGNOSIS — Z94.9 ORGAN TRANSPLANT: Primary | ICD-10-CM

## 2024-08-29 DIAGNOSIS — N18.4 CKD (CHRONIC KIDNEY DISEASE), STAGE IV: ICD-10-CM

## 2024-08-29 DIAGNOSIS — I10 PRIMARY HYPERTENSION: ICD-10-CM

## 2024-08-29 PROBLEM — R78.81 GRAM-NEGATIVE BACTEREMIA: Status: RESOLVED | Noted: 2021-06-16 | Resolved: 2024-08-29

## 2024-08-29 PROBLEM — A41.9 SEVERE SEPSIS: Status: RESOLVED | Noted: 2021-06-13 | Resolved: 2024-08-29

## 2024-08-29 PROBLEM — E87.5 HYPERKALEMIA: Status: RESOLVED | Noted: 2021-05-01 | Resolved: 2024-08-29

## 2024-08-29 PROBLEM — G93.41 ENCEPHALOPATHY, METABOLIC: Status: RESOLVED | Noted: 2021-06-13 | Resolved: 2024-08-29

## 2024-08-29 PROBLEM — J96.01 ACUTE HYPOXEMIC RESPIRATORY FAILURE: Status: RESOLVED | Noted: 2020-08-19 | Resolved: 2024-08-29

## 2024-08-29 PROBLEM — E87.20 LACTIC ACIDOSIS: Status: RESOLVED | Noted: 2021-06-13 | Resolved: 2024-08-29

## 2024-08-29 PROBLEM — R65.20 SEVERE SEPSIS: Status: RESOLVED | Noted: 2021-06-13 | Resolved: 2024-08-29

## 2024-08-29 PROBLEM — R74.01 TRANSAMINITIS: Status: RESOLVED | Noted: 2021-06-13 | Resolved: 2024-08-29

## 2024-08-29 PROBLEM — E87.5 HIGH POTASSIUM: Status: RESOLVED | Noted: 2021-10-14 | Resolved: 2024-08-29

## 2024-08-29 PROCEDURE — 72192 CT PELVIS W/O DYE: CPT | Mod: 26,TXP,, | Performed by: RADIOLOGY

## 2024-08-29 PROCEDURE — 72192 CT PELVIS W/O DYE: CPT | Mod: TC,TXP

## 2024-08-29 PROCEDURE — 71046 X-RAY EXAM CHEST 2 VIEWS: CPT | Mod: TC,TXP

## 2024-08-29 PROCEDURE — 71046 X-RAY EXAM CHEST 2 VIEWS: CPT | Mod: 26,TXP,, | Performed by: INTERNAL MEDICINE

## 2024-08-29 PROCEDURE — 99999 PR PBB SHADOW E&M-EST. PATIENT-LVL V: CPT | Mod: PBBFAC,TXP,, | Performed by: NURSE PRACTITIONER

## 2024-08-29 PROCEDURE — 93978 VASCULAR STUDY: CPT | Mod: 26,TXP,, | Performed by: RADIOLOGY

## 2024-08-29 PROCEDURE — 76770 US EXAM ABDO BACK WALL COMP: CPT | Mod: 26,TXP,, | Performed by: RADIOLOGY

## 2024-08-29 PROCEDURE — 76770 US EXAM ABDO BACK WALL COMP: CPT | Mod: TC,TXP

## 2024-08-29 PROCEDURE — 93978 VASCULAR STUDY: CPT | Mod: TC,TXP

## 2024-08-29 RX ORDER — CAPTOPRIL 50 MG/1
50 TABLET ORAL 2 TIMES DAILY
COMMUNITY
Start: 2024-03-11

## 2024-08-29 RX ORDER — DAPAGLIFLOZIN 10 MG/1
10 TABLET, FILM COATED ORAL DAILY
COMMUNITY

## 2024-08-29 RX ORDER — DULAGLUTIDE 0.75 MG/.5ML
0.75 INJECTION, SOLUTION SUBCUTANEOUS
COMMUNITY

## 2024-08-29 RX ORDER — DOXAZOSIN 4 MG/1
8 TABLET ORAL NIGHTLY
COMMUNITY

## 2024-08-29 NOTE — PROGRESS NOTES
Patient received Prevnar 20 vaccine IM to the right deltoid. Tolerated well left NAD.   Audio Ros Creole during the appointment, ID number [id number].

## 2024-08-29 NOTE — PROGRESS NOTES
PHARM.D. PRE-TRANSPLANT NOTE:    This patient's medication therapy was evaluated as part of his pre-transplant evaluation.      The following general pharmacologic concerns were noted: Aspirin can increase periop bleeding risk; Patient on diltiazem for BP - interacts with CNIs    The following concerns for post-operative pain management were noted: N/A    The following pharmacologic concerns related to HCV therapy were noted: N/A      This patient's medication profile was reviewed for considerations for DAA Hepatitis C therapy:    [x]  No current inducers of CYP 3A4 or PGP  [x]  No amiodarone on this patient's EMR profile in the last 24 months  [x]  No past or current atrial fibrillation on this patient's EMR profile       Current Outpatient Medications   Medication Sig Dispense Refill    aspirin 81 MG Chew Take 81 mg by mouth once daily.      blood sugar diagnostic Strp 1 each by Misc.(Non-Drug; Combo Route) route 3 (three) times daily. 90 each 2    blood-glucose meter kit Check blood sugar three times daily and keep a log of your results to take to your primary care provider. 1 each 0    captopriL (CAPOTEN) 50 MG tablet Take 50 mg by mouth 2 (two) times daily.      cloNIDine (CATAPRES) 0.1 MG tablet Take 0.1 mg by mouth 2 (two) times daily.      diltiaZEM (TIAZAC) 240 MG Cs24 Take 240 mg by mouth once daily.      doxazosin (CARDURA) 4 MG tablet Take 8 mg by mouth every evening.      dulaglutide (TRULICITY) 0.75 mg/0.5 mL pen injector Inject 0.75 mg into the skin every 7 days. Mondays      FARXIGA 10 mg tablet Take 10 mg by mouth once daily.      ferrous gluconate (FERGON) 324 MG tablet Take 1 tablet (324 mg total) by mouth 2 (two) times daily before meals. 120 tablet 2    multivit-min-FA-lycopen-lutein (CENTRUM SILVER MEN) 300-600-300 mcg Tab Take 1 tablet by mouth once daily. 90 tablet 3    ondansetron (ZOFRAN-ODT) 8 MG TbDL Take 1 tablet (8 mg total) by mouth every 8 (eight) hours as needed (nausea). 30 tablet 1     pravastatin (PRAVACHOL) 40 MG tablet Take 40 mg by mouth once daily.      TRADJENTA 5 mg Tab tablet Take 5 mg by mouth once daily.      albuterol (PROVENTIL/VENTOLIN HFA) 90 mcg/actuation inhaler Inhale 2 puffs into the lungs every 6 (six) hours as needed for Wheezing. 18 g 0     No current facility-administered medications for this visit.           I am available for consultation and can be contacted, as needed by the other members of the Transplant team.

## 2024-08-29 NOTE — PROGRESS NOTES
INITIAL PATIENT EDUCATION NOTE AA    Mr. Filippo Martinez was seen in pre-kidney transplant clinic for evaluation for kidney, kidney/pancreas or pancreas only transplant.  The patient attended an individual video education session that discussed/reviewed the following aspects of transplantation: evaluation including diagnostic and laboratory testing,(Chemistries, Hematology, Serologies including HIV and Hepatitis and HLA) required for transplantation and selection committee process, UNOS waitlist management/multiple listings, types of organs offered (KDPI < 85%, KDPI > 85%, PHS risk, DCD, HCV+, HIV+ for HIV+ recipients and enbloc/dual), financial aspects, surgical procedures, dietary instruction pre- and post-transplant, health maintenance pre- and post-transplant, post-transplant hospitalization and outpatient follow-up, potential to participate in a research protocol, and medication management and side effects.  A question and answer session was provided after the presentation.    The patient was seen by all members of the multi-disciplinary team to include: Nephrologist/CHEVY, Surgeon, , Transplant Coordinator, , Pharmacist and Dietician (if applicable).    The patient reviewed and signed all consents for evaluation which were witnessed and sent to scanning into the [facility] chart.    The patient was given an education book and plan for further evaluation based on his individual assessment.      The Patient was educated on OPTN policy change regarding race based eGFR. For Black or  Americans, this eGFR could have shown that their kidneys were working better than they were.    Because of this change, we are looking at everyones record and assessing waiting time for people who are eligible. We will be reviewing your medical records and will notify you if you are eligible. We also encouraged patient to provide span 20 labs that are not in our electronic medical  This is a 66-year-old female with a history of osteoarthritis, migraines, hyperlipidemia, acute pancreatitis, GERD, and difficulty swallowing presenting for follow-up.  She was last seen 2/3/2020.  She had undergone an EGD in late December notable for a small hiatal hernia, normal stomach, and normal examined duodenum.  There was no endoscopic abnormality to explain dysphagia.  She was empirically dilated to 46 [place] with a Sr dilator.  She denied episodes of dysphagia since EGD.  Heartburn was controlled with omeprazole.  She was instructed to continue omeprazole.  Colon cancer screening is up-to-date and due in 2021. She is compliant with daily omeprazole.  She denies heartburn, regurgitation, or dysphagia.  She denies abdominal pain.  She reports onset of pain in July when she was visiting family in [place] indicating pain in the right posterior thoracic area toward the right  CVA.  The pain was constant for 3 days.  There was no association with meals or defecation.  She tried Tums and position changes without improvement.  She denies associated nausea.  She had a recurrent episode that lasted a day early in October when she was visiting family in [place].  She denies positional exacerbations.  She recently had knee surgery.  She had constipation when on pain medication.  She is no longer requiring the medication and her bowels are moving regularly.  She denies any other abdominal symptoms. records    Reviewed program requirement for complete COVID vaccination with documentation prior to listing.  COVID education information reviewed with patient. Patient encouraged to be up to date on all vaccinations.     The patient was informed that the transplant team would manage immediate post op pain. If the patient requires long term pain management, they will need to have that pain management addressed by their PCP or previous provider who wrote for long term pain medicines.    The patient was encouraged to call with any questions or concerns.

## 2024-08-29 NOTE — PROGRESS NOTES
PRE-TRANSPLANT INFECTIOUS DISEASE CONSULT    Reason for Visit:  Pre-transplant evaluation  Referring Provider: Dr. Crys Lee     History of Present Illness:    69 y.o. male with a history of CKD presents for pre-kidney transplant evaluation.  From [place] - emigrate 1988 - denies history Malaria or parasitic infections    Infectious History:  Recent hospital admissions: None  Recent infections: No  Recent or current antibiotic use: No  History of recurrent infections *(sinus / pneumonia / UTI / SBP)*: No  History of diabetic foot wound or bone/joint infection: No  Recent dental infections, issues or procedures: No  History of chicken pox: No  History of shingles: No  History of STI: Yes- syphilis - 3 shots penicillin - does not remember remember when   History of COVID infection: Yes    History of Immunosuppression:  Prior chemotherapy / immunosuppression: No  Prior transplant: No  History of splenectomy: No    Tuberculosis:  Prior screening for latent TB: No  Prior diagnosis of latent TB: No  Risk factors for TB *known exposure, incarceration, homelessness*: Yes    Geographical exposures:  Currently lives in [place] with wife and kids  Lived in the following states: [place], [place], [place]  Lived or travelled to the [facility] [place]: No  International travel: Yes -[place], [place], [place], [place]  Travel-associated illness: No    Social/Environmental:  Occupation:  [employer] [address]  Pets: No   Livestock: No  Fishing / hunting: No  Hobbies: Sports/exercise  Water: [place]  Consumption of raw/undercooked meat or seafood?  No  Tobacco: No  Alcohol: No  Recreational drug use:  No  Sexual partners: wife      Past Histories:   Past Medical History:   Diagnosis Date    Anemia     Cataract     COVID-19     Diabetes mellitus, type 2     Disorder of kidney and ureter     History of transient ischemic attack (TIA)     Hyperlipidemia     Hypertension 2004    Obstructive uropathy     Personal history of transient ischemic  attack     Prostate cancer     Sickle cell trait     Transient cerebral ischemic attack     Vitamin D deficiency      Past Surgical History:   Procedure Laterality Date    CATARACT EXTRACTION W/  INTRAOCULAR LENS IMPLANT Right 07/24/2020    CATARACT EXTRACTION W/  INTRAOCULAR LENS IMPLANT Left 08/04/2020    CYSTOSCOPY W/ URETERAL STENT PLACEMENT N/A 06/13/2021    Procedure: CYSTOSCOPY, WITH URETERAL STENT INSERTION;  Surgeon: APOORVA Salazar MD;  Location: [facility];  Service: Urology;  Laterality: N/A;    EYE SURGERY      PROSTATECTOMY  05/19/2017    PROSTATECTOMY, RADICAL, LAPAROSCOPIC, RETROPUBIC APPROACH       Family History   Problem Relation Name Age of Onset    Diabetes Mother      Hyperlipidemia Mother      Diabetes Father      Hyperlipidemia Father      Asthma Father       Social History     Tobacco Use    Smoking status: Never    Smokeless tobacco: Never   Substance Use Topics    Alcohol use: No    Drug use: No     Review of patient's allergies indicates:  No Known Allergies      Current antibiotics:  Antibiotics (From admission, onward)      None              Review of Systems  Review of Systems   Constitutional: Negative for chills, fever, malaise/fatigue and night sweats.   Cardiovascular:  Negative for chest pain.   Respiratory:  Negative for cough, hemoptysis, shortness of breath, sputum production and wheezing.    Skin:  Negative for rash and suspicious lesions.   Gastrointestinal:  Negative for abdominal pain, constipation, diarrhea, heartburn, nausea and vomiting.   Genitourinary:  Negative for dysuria and hematuria.          Objective  Physical Exam  Constitutional:       General: He is not in acute distress.     Appearance: Normal appearance. He is well-developed. He is not ill-appearing, toxic-appearing or diaphoretic.       HENT:      Head: Normocephalic and atraumatic.      Mouth/Throat:      Lips: Pink. No lesions.      Mouth: Mucous membranes are moist. No injury or oral lesions.       "Dentition: Abnormal dentition (missing teeth). Does not have dentures. No gingival swelling, dental caries, dental abscesses or gum lesions.      Tongue: No lesions.      Palate: No mass and lesions.      Pharynx: Oropharynx is clear.   Cardiovascular:      Rate and Rhythm: Normal rate and regular rhythm.      Heart sounds: Normal heart sounds. No murmur heard.     No friction rub. No gallop.   Pulmonary:      Effort: Pulmonary effort is normal. No respiratory distress.      Breath sounds: Normal breath sounds. No wheezing or rales.   Abdominal:      General: Bowel sounds are normal. There is no distension.      Palpations: Abdomen is soft. There is no mass.      Tenderness: There is no abdominal tenderness. There is no guarding or rebound.   Skin:     General: Skin is warm and dry.   Neurological:      Mental Status: He is alert and oriented to person, place, and time.   Psychiatric:         Behavior: Behavior normal.           Labs:    CBC:   Lab Results   Component Value Date    WBC 5.43 08/29/2024    HGB 11.3 (L) 08/29/2024    HCT 35.3 (L) 08/29/2024    MCV 97 08/29/2024     08/29/2024    GRAN 2.9 08/29/2024    GRAN 53.7 08/29/2024    LYMPH 2.0 08/29/2024    LYMPH 36.5 08/29/2024    MONO 0.4 08/29/2024    MONO 7.0 08/29/2024    EOSINOPHIL 1.5 08/29/2024       Syphilis screening:   Lab Results   Component Value Date    TREPABIGMIGG Reactive (A) 08/29/2024        TB screening: No results found for: "TBGOLDPLUS", "TSPOTSCREN"    HIV screening:   Lab Results   Component Value Date    OTC70OKHX Non-reactive 08/29/2024       Strongyloides IgG: No results found for: "STRONGANTIGG"    Hepatitis Serologies:   Lab Results   Component Value Date    HEPAIGG Reactive 08/29/2024    HEPBSAB 401.58 08/29/2024    HEPBSAB Reactive 08/29/2024    HEPCAB Non-reactive 08/29/2024        Varicella IgG: No results found for: "VARICELLAINT"      Immunization History   Administered Date(s) Administered    COVID-19, MRNA, LN-S, PF " (MODERNA FULL 0.5 ML DOSE) 06/11/2021, 01/05/2022        Immunization History:  Received all childhood vaccines: Yes  All household members receive annual flu vaccine: Yes  All household members are up to date on COVID vaccine: Yes    Assessment and Plan    1. Risks of Infection: Available serologies were reviewed. No unusual risks of infection or significant barriers to transplantation were identified from the infectious disease standpoint given the information available at this time.    - Acute infectious issues: None though Treponemal antibody positive but reports treatment in past - If RPR titers return positive then refer to ID clinic to see ID staff   - Pending serologies: Quantiferon gold / T-spot and Strongyloides IgG   - Please call if any pending serologic testing is positive.    2. Immunizations:  Based on the patient's immunization history and serologies, the following immunizations are recommended:  - Hepatitis A    Patient does have immunity to hepatitis A    Vaccination ordered today: No. Reason for not ordering: Immunity demonstrated on serology   - Hepatitis B    Patient does have immunity to hepatitis B    Vaccination ordered today: Yes   - COVID    Current CDC vaccination recommendations were discussed with the patient   - Annual high dose influenza     Vaccination ordered today: No. Reason for not ordering: Vaccination out of stock / not in season   - Prevnar 20    Vaccination ordered today: Yes - today in ID clinic   - Tdap    Vaccination ordered today: Yes   - Shingrix    Vaccination ordered today: Yes    Recommended Pre-Transplant Immunization Schedule   Vaccine  0m 1m 2m 6m   Pneumococcal conjugate vaccine (Prevnar 20) X      Tetanus-diphtheria-pertussis (Tdap)* X      Hepatitis A Vaccine (Havrix)** X   X   Hepatitis B Vaccine (Heplisav)** X X     Influenza (annual) X      Zoster Recombinant Vaccine (Shingrix) X  X           *Administer booster every 10 years.       **Administer if no  immunity demonstrated on serologies               NOTE: Patient will receive vaccines today in ID clinic, and was provided with a prescription to receive all subsequent vaccine doses at local pharmacy.  Patient believes he got very sick and almost  from 1st COVID vaccine,  Per chart review he had 1st COVID vaccine and enter the hospital 2 days later.  There he was with gram negative bacteremia, suspected urinary source as had hydronephrosis and required stenting. He recovered with proper treatment and proceeded to have the 2nd COVID vaccine without issue.  This was all detailed to him.  Do not suspect vaccination was related.    3. Counseling:   I discussed with the patient the risk for increased susceptibility to infections following transplantation including increased risk for infection right after transplant and if rejection should occur.  The patient has been counseled on the importance of vaccinations to decrease risk of infection and severe illness. Specific guidance has been provided to the patient regarding the patient's occupation, hobbies and activities to avoid future infectious complications.     4. Transplant Candidacy: Based on available information, there are no identified significant barriers to transplantation from an infectious disease standpoint.  Final determination of transplant candidacy will be made once evaluation is complete and reviewed by the Selection Committee. Is Treponemal antibody positive but reports treatment in past - If RPR titers return positive then refer to ID clinic to see ID staff.      Follow up with infectious disease as needed.       The total time for evaluation and management services performed on 2024 was greater than 45 minutes.

## 2024-08-29 NOTE — PROGRESS NOTES
TRANSPLANT NUTRITIONAL ASSESSMENT    Referring Provider: Crys Lee MD     Reason for Visit: Pre-kidney transplant work-up (pre-dialysis)    Age: 69 y.o.  Sex: male    Patient Active Problem List   Diagnosis    Hyperlipidemia    Type 2 diabetes mellitus with hyperglycemia, without long-term current use of insulin    Gastroesophageal reflux disease without esophagitis    Anemia in stage 4 chronic kidney disease    CKD (chronic kidney disease), stage IV    Hx urinary retention    Acute renal failure superimposed on stage 4 chronic kidney disease    Acute urinary obstruction    History of robot-assisted laparoscopic radical prostatectomy    TIA (transient ischemic attack)    Metabolic acidosis    Hydronephrosis     Past Medical History:   Diagnosis Date    Anemia     Cataract     COVID-19     Diabetes mellitus, type 2     Disorder of kidney and ureter     History of transient ischemic attack (TIA)     Hyperlipidemia     Hypertension 2004    Obstructive uropathy     Personal history of transient ischemic attack     Prostate cancer     Sickle cell trait     Syphilis, unspecified     Transient cerebral ischemic attack     Vitamin D deficiency      Lab Results   Component Value Date     (H) 08/29/2024    K 4.7 08/29/2024    PHOS 3.7 08/29/2024    MG 1.7 10/14/2021    CHOL 157 08/29/2024    HDL 35 (L) 08/29/2024    TRIG 104 08/29/2024    ALBUMIN 4.6 08/29/2024    HGBA1C 6.1 (H) 08/29/2024    CALCIUM 9.7 08/29/2024     Other Pertinent Labs: N/A    Current Outpatient Medications   Medication Sig    aspirin 81 MG Chew Take 81 mg by mouth once daily.    blood sugar diagnostic Strp 1 each by Misc.(Non-Drug; Combo Route) route 3 (three) times daily.    blood-glucose meter kit Check blood sugar three times daily and keep a log of your results to take to your primary care provider.    captopriL (CAPOTEN) 50 MG tablet Take 50 mg by mouth 2 (two) times daily.    cloNIDine (CATAPRES) 0.1 MG tablet Take 0.1 mg by mouth 2 (two)  "times daily.    diltiaZEM (TIAZAC) 240 MG Cs24 Take 240 mg by mouth once daily.    doxazosin (CARDURA) 4 MG tablet Take 8 mg by mouth every evening.    dulaglutide (TRULICITY) 0.75 mg/0.5 mL pen injector Inject 0.75 mg into the skin every 7 days. Mondays    FARXIGA 10 mg tablet Take 10 mg by mouth once daily.    ferrous gluconate (FERGON) 324 MG tablet Take 1 tablet (324 mg total) by mouth 2 (two) times daily before meals.    multivit-min-FA-lycopen-lutein (CENTRUM SILVER MEN) 300-600-300 mcg Tab Take 1 tablet by mouth once daily.    ondansetron (ZOFRAN-ODT) 8 MG TbDL Take 1 tablet (8 mg total) by mouth every 8 (eight) hours as needed (nausea).    pravastatin (PRAVACHOL) 40 MG tablet Take 40 mg by mouth once daily.    TRADJENTA 5 mg Tab tablet Take 5 mg by mouth once daily.    albuterol (PROVENTIL/VENTOLIN HFA) 90 mcg/actuation inhaler Inhale 2 puffs into the lungs every 6 (six) hours as needed for Wheezing.     No current facility-administered medications for this visit.     Allergies: Patient has no known allergies.    Ht Readings from Last 1 Encounters:   08/29/24 5' 6.3" (1.684 m)     Wt Readings from Last 1 Encounters:   08/29/24 68.8 kg (151 lb 10.8 oz)      BMI: Body mass index is 24.26 kg/m².    Usual Weight: 164 lb   Weight Change/Time: no significant wt loss   Current Diet: regular   Appetite/Current Intake: good   Exercise/Physical Activity: functional in ADLs   Nutritional/Herbal Supplements: iron  Chewing/Swallowing Problems: none  Symptoms: none     Estimated Kcal Need: 1720 kcal/day (25 kcal/kg)   Estimated Protein Need: 55-62 gm/day (0.8-0.9 gm/kg)     Nutritional History:   Pt and spouse present, interpretor via computer. Pt reports that he consumes limited beef and does not consume rice, potatoes, or bread.     Diet Recall    Morning: "soup" (plantains, coconut milk, spinach) or cheese and croissant     Midday: leftovers from breakfast     Evening: enjoys portage, fish, chicken occasionally, " vegetables daily (broccoli, carrots, cabbage, eggplant, water crisps, celery, yam)     Snacks/Desserts: cookies, peanuts      Beverages: water, cranberry juice       Seasonings: herbs and spices, pink salt     Restaurants/Fast Food: sometimes    Nutritional Diagnoses  Problem: food- and nutrition-related knowledge deficit  Etiology: RT lack of previous education on pre-kidney transplant nutrition recommendations  Symptoms: AEB diet recall and questions from pt     Educational Need? yes  Barriers: language  Discussed with: patient and spouse  Interventions: Patient taught nutrition information regarding Pre-kidney transplant work-up (pre-dialysis). Renal Nutrition Therapy packet reviewed (high/low food sources of K, Phos and protein, low sodium and fluid intake, emphasis on moderate protein intake). Encouraged physical activity daily, regular exercise as tolerated, stay mobile.   Goals/Recommendations: diet adherence  Actions Taken: instruct/provide written information  Patient and/or family comprehend instructions: yes  Outcome: Verbalizes understanding  Monitoring: Contact information provided, will f/u in clinic and communicate with the care team as needed.     Counseling Time: 30 minutes

## 2024-08-29 NOTE — LETTER
August 29, 2024        Crys Lee  [address] [place] [address] [address]  [place] [address]  Phone: [phone]  Fax: [phone]             [facility]- Transplant 1st Fl  [address]  [facility] [address]  Phone: [phone]   Patient: Filippo Martinez   MR Number: [id number]   YOB: 1955   Date of Visit: 8/29/2024       Dear Dr. Crys Lee    Thank you for referring Filippo Martinez to me for evaluation. Attached you will find relevant portions of my assessment and plan of care.    If you have questions, please do not hesitate to call me. I look forward to following Filippo Martinez along with you.    Sincerely,    Edith Martinez, AMANDA    Enclosure    If you would like to receive this communication electronically, please contact [email or web address] or [phone] to request [employer] access.    [employer] is a tool which provides read-only access to select patient information with whom you have a relationship. Its easy to use and provides real time access to review your patients record including encounter summaries, notes, results, and demographic information.    If you feel you have received this communication in error or would no longer like to receive these types of communications, please e-mail [email or web address]

## 2024-08-29 NOTE — PROGRESS NOTES
Transplant Nephrology  Kidney Transplant Recipient Evaluation    Referring Physician: Crys Lee  Current Nephrologist: Crys Lee    Subjective:   CC:  Initial evaluation of kidney transplant candidacy.    HPI:  Mr. Martinez is a 69 y.o. year old Black ([place]ian) male who has presented to be evaluated as a potential kidney transplant recipient.  He has advanced kidney disease secondary to presumed diabetic nephropathy and HTN, no renal biopsy.  Patient is currently pre-dialysis, does not have a dialysis access.     He requires use of [place]ian [employer] . Jelani (#[id number]) used to complete the visit. He speaks very minimal English. Here with his wife today who speaks small amount of English.     DM2 and HTN for about 25 years. Denies retinopathy, neuropathy, or gastroparesis. Well controlled with A1c today 6.1.    Diagnosed with prostate CA in 2013 and was set up for a prostatectomy but was then lost to follow up as he was in [facility] for several years. Returned to urology in 2017 requesting to get done. Underwent robotic assisted radical retropubic prostatectomy on 5/19/2017.   FINAL PATH REPORT: pT2c, N0/5, Mx  Blanche's score 3 + 4 = 7/10  Grade group - 2     Hospital admit 2021 for uncontrolled HTN. Found to have urinary retention. Patient required bedside dilation and carty placement. Later had emergent cystoscopy for R sided hydronephrosis  with R ureteral stent placement. Mucous plug removed at R UVJ during procedure. Stent later removed and passed voiding trial. He reports that he has no problems passing urine now.    Remains active, works as a . Uses no assistive devices. Looks good, not frail.    Does have a TIA history. No MI, CVA, DVT/PE history. He has been treated for syphilis in the past.     Plans to discuss living donation with family and friends.    Current Outpatient Medications   Medication Sig Dispense Refill    aspirin 81 MG Chew Take 81 mg by mouth once daily.      blood  sugar diagnostic Strp 1 each by Misc.(Non-Drug; Combo Route) route 3 (three) times daily. 90 each 2    blood-glucose meter kit Check blood sugar three times daily and keep a log of your results to take to your primary care provider. 1 each 0    captopriL (CAPOTEN) 50 MG tablet Take 50 mg by mouth 2 (two) times daily.      cloNIDine (CATAPRES) 0.1 MG tablet Take 0.1 mg by mouth 2 (two) times daily.      diltiaZEM (TIAZAC) 240 MG Cs24 Take 240 mg by mouth once daily.      doxazosin (CARDURA) 4 MG tablet Take 8 mg by mouth every evening.      dulaglutide (TRULICITY) 0.75 mg/0.5 mL pen injector Inject 0.75 mg into the skin every 7 days. Mondays      FARXIGA 10 mg tablet Take 10 mg by mouth once daily.      ferrous gluconate (FERGON) 324 MG tablet Take 1 tablet (324 mg total) by mouth 2 (two) times daily before meals. 120 tablet 2    multivit-min-FA-lycopen-lutein (CENTRUM SILVER MEN) 300-600-300 mcg Tab Take 1 tablet by mouth once daily. 90 tablet 3    ondansetron (ZOFRAN-ODT) 8 MG TbDL Take 1 tablet (8 mg total) by mouth every 8 (eight) hours as needed (nausea). 30 tablet 1    pravastatin (PRAVACHOL) 40 MG tablet Take 40 mg by mouth once daily.      TRADJENTA 5 mg Tab tablet Take 5 mg by mouth once daily.      albuterol (PROVENTIL/VENTOLIN HFA) 90 mcg/actuation inhaler Inhale 2 puffs into the lungs every 6 (six) hours as needed for Wheezing. 18 g 0     No current facility-administered medications for this visit.       Past Medical History:   Diagnosis Date    Anemia     Cataract     COVID-19     Diabetes mellitus, type 2     Disorder of kidney and ureter     History of transient ischemic attack (TIA)     Hyperlipidemia     Hypertension 2004    Obstructive uropathy     Personal history of transient ischemic attack     Prostate cancer     Sickle cell trait     Syphilis, unspecified     Transient cerebral ischemic attack     Vitamin D deficiency        Past Medical and Surgical History: Mr. Martinez  has a past medical  "history of Anemia, Cataract, COVID-19, Diabetes mellitus, type 2, Disorder of kidney and ureter, History of transient ischemic attack (TIA), Hyperlipidemia, Hypertension, Obstructive uropathy, Personal history of transient ischemic attack, Prostate cancer, Sickle cell trait, Syphilis, unspecified, Transient cerebral ischemic attack, and Vitamin D deficiency.  He has a past surgical history that includes Cataract extraction w/  intraocular lens implant (Right, 07/24/2020); Cataract extraction w/  intraocular lens implant (Left, 08/04/2020); Prostatectomy (05/19/2017); Cystoscopy w/ ureteral stent placement (N/A, 06/13/2021); Eye surgery; and prostatectomy, radical, laparoscopic, retropubic approach.    Past Social and Family History: Mr. Martinez reports that he has never smoked. He has never used smokeless tobacco. He reports that he does not drink alcohol and does not use drugs. His family history includes Asthma in his father; Diabetes in his father and mother; Hyperlipidemia in his father and mother.    Review of Systems   Constitutional:  Negative for activity change and fever.   Eyes:  Positive for visual disturbance.        Wears glasses   Respiratory:  Negative for cough and shortness of breath.    Cardiovascular:  Negative for chest pain and leg swelling.   Gastrointestinal:  Negative for abdominal pain, constipation, diarrhea and nausea.   Genitourinary:  Negative for difficulty urinating, frequency and hematuria.   Musculoskeletal:  Negative for arthralgias and myalgias.   Skin:  Negative for wound.   Neurological:  Negative for weakness.   Psychiatric/Behavioral:  Negative for sleep disturbance.        Objective:   Blood pressure (!) 206/96, pulse 93, temperature 97.3 °F (36.3 °C), temperature source Temporal, resp. rate 18, height 5' 6.3" (1.684 m), weight 68.8 kg (151 lb 10.8 oz), SpO2 100%.body mass index is 24.26 kg/m².    Physical Exam  Vitals and nursing note reviewed.   Constitutional:       " Appearance: Normal appearance.   Cardiovascular:      Rate and Rhythm: Normal rate and regular rhythm.      Heart sounds: Normal heart sounds.   Pulmonary:      Effort: Pulmonary effort is normal.      Breath sounds: Normal breath sounds.   Abdominal:      General: There is no distension.   Musculoskeletal:         General: Normal range of motion.   Skin:     General: Skin is warm and dry.   Neurological:      Mental Status: He is alert.         Labs:  Lab Results   Component Value Date    WBC 5.43 08/29/2024    HGB 11.3 (L) 08/29/2024    HCT 35.3 (L) 08/29/2024     08/29/2024    K 4.7 08/29/2024     08/29/2024    CO2 22 (L) 08/29/2024    BUN 35 (H) 08/29/2024    CREATININE 3.3 (H) 08/29/2024    EGFRNORACEVR 19.4 (A) 08/29/2024    GLUCOSE 156 (H) 02/18/2022    CALCIUM 9.7 08/29/2024    PHOS 3.7 08/29/2024    MG 1.7 10/14/2021    ALBUMIN 4.6 08/29/2024    AST 13 08/29/2024    ALT 20 08/29/2024    UTPCR 3.57 (H) 06/13/2021    .6 (H) 08/29/2024       Lab Results   Component Value Date    BILIRUBINUA Negative 10/14/2021    LIPASE 22 06/13/2021    PROTEINUA 1+ (A) 10/14/2021    NITRITE Negative 10/14/2021    RBCUA 0 10/14/2021    WBCUA 0 10/14/2021     Labs were reviewed with the patient.    Assessment:     1. Pre-transplant evaluation for kidney transplant    2. CKD (chronic kidney disease), stage IV    3. Type 2 diabetes mellitus with hyperglycemia, without long-term current use of insulin    4. Primary hypertension    5. History of prostate cancer    6. BMI 24.0-24.9, adult      Plan:   69 y.o. year old Black (Micron[place]) male who has presented to be evaluated as a potential kidney transplant recipient.  He has advanced kidney disease secondary to presumed diabetic nephropathy and HTN, no renal biopsy.  He will need afternoon imaging, cardiac testing, colonoscopy, and urology clearance (history prostate CA) prior to selection. Advised seeking out living donors due to his age, blood type O, and no  waiting time. Provided card and instructed on how to get donors tested.     He will require [facility]  for all visits.    Transplant Candidacy:   Based on available information, Mr. Martinez is a suitable kidney transplant candidate.   Meets center eligibility for accepting HCV+ donor offer - Yes.  Patient educated on HCV+ donors. Filippo is willing to accept HCV+ donor offer - Yes   Patient is a candidate for KDPI > 85 kidney donor offer - Yes.  Final determination of transplant candidacy will be made once workup is complete and reviewed by the selection committee.    Patient advised that it is recommended that all transplant candidates, and their close contacts and household members receive Covid vaccination.    UNOS Patient Status  Functional Status: 90% - Able to carry on normal activity: minor symptoms of disease    Edith Martinez NP

## 2024-08-29 NOTE — PROGRESS NOTES
Transplant Surgery  Kidney Transplant Recipient Evaluation    Referring Physician: Crys Lee  Current Nephrologist: Crys Lee    Subjective:     Reason for Visit: evaluate transplant candidacy    History of Present Illness: Filippo Martinez is a 69 y.o. year old male undergoing transplant evaluation.    Dialysis History: Filippo is pre-dialysis.      Transplant History: N/A    Etiology of Renal Disease: Diabetes Mellitus - Type II (based on medical records from referral).    External provider notes reviewed: Yes    Review of Systems  Objective:     Physical Exam:  Constitutional:   Vitals reviewed: yes   Well-nourished and well-groomed: yes  Eyes:   Sclerae icteric: no   Extraocular movements intact: yes  GI:    Bowel sounds normal: yes   Tenderness: no    If yes, quadrant/location: not applicable   Palpable masses: no    If yes, quadrant/location: not applicable   Hepatosplenomegaly: no   Ascites: no   Hernia: no    If yes, type/location: not applicable   Surgical scars: yes    If yes, type/location: laparoscopic port sites, from robotic prostatectomy  Resp:   Effort normal: yes   Breath sounds normal: yes    CV:   Regular rate and rhythm: yes   Heart sounds normal: yes   Femoral pulses normal: yes   Extremities edematous: no  Skin:   Rashes or lesions present: no    If yes, describe:not applicable   Jaundice:: no    Musculoskeletal:   Gait normal: yes   Strength normal: yes  Psych:   Oriented to person, place, and time: yes   Affect and mood normal: yes    Additional comments: not applicable    Diagnostics:  The following labs have been reviewed: CBC  CMP  INR  The following radiology images have been independently reviewed and interpreted: pending    Counseling: We provided Filippo Martinez with a group education session today.  We discussed kidney transplantation at length with him, including risks, potential complications, and alternatives in the management of his renal failure.  The discussion included  complications related to anesthesia, bleeding, infection, primary nonfunction, and ATN.  I discussed the typical postoperative course, length of hospitalization, the need for long-term immunosuppression, and the need for long-term routine follow-up.  I discussed living-donor and -donor transplantation and the relative advantages and disadvantages of each.  I also discussed average waiting times for both living donation and  donation.  I discussed national and center-specific survival rates.  I also mentioned the potential benefit of multicenter listing to candidates listed with centers within more than one organ procurement organization.  All questions were answered.    Patient advised that it is recommended that all transplant candidates, and their close contacts and household members receive Covid vaccination.    Final determination of transplant candidacy will be made once evaluation is complete and reviewed by the Kidney & Kidney/Pancreas Selection Committee.    Coronavirus disease (COVID-19) caused by severe acute respiratory virus coronavirus 2 (SARS-C0V 2) is associated with increased mortality in solid organ transplant recipients (SOT) compared to non-transplant patients. Vaccine responses to vaccination are depressed against SARS-CoV2 compared to normal individuals but improve with third vaccination doses. Vaccination prior to SOT provides both the best opportunity for transplant candidates to develop protective immunity and to reduce the risk of serious COVID19 infections post transplantation. Organ transplant candidates at [facility] will be required to receive SARS-CoV-2 vaccination prior to being listed with a an active status, whenever possible. Exceptions will be made for disability related reasons or for sincerely held Shinto beliefs.          Transplant Surgery - Candidacy   Assessment/Plan:   Filippo Martinez is pre-dialysis with CKD stage 4  (GFR 15-29 mL/min). I see no surgical contraindication to placing a kidney transplant. Based on available information, Filippo Martinez is a suitable kidney transplant candidate. He reports daily incontinence since his prostatectomy    Additional testing to be completed according to the Written Order Guidelines for Adult Pre-kidney and Pancreas Transplant Evaluation (KI-02).  Interpretation of tests and discussion of patient management involves all members of the multidisciplinary transplant team.    Jose Elias Marques Jr, MD

## 2024-09-04 ENCOUNTER — TELEPHONE (OUTPATIENT)
Dept: INFECTIOUS DISEASES | Facility: CLINIC | Age: 69
End: 2024-09-04
Payer: MEDICARE

## 2024-09-04 NOTE — TELEPHONE ENCOUNTER
----- Message from Demond Olson Jr. PA sent at 9/3/2024  5:05 PM CDT -----  Regarding: Fu with ID staff as RPR 1:1 and + quant gold in pretransplant patient originally from [facility]  Please schedule Fu with ID staff as RPR 1:1 and + quant gold in pretransplant patient originally from [facility]  ----- Message -----  From: Edith Martinez NP  Sent: 9/3/2024   8:20 AM CDT  To: HARPAL Durham Jr.    Pre kidney transplant work up patient you saw last week, + quant gold  ----- Message -----  From: Kayode, [facility]  Sent: 8/29/2024   8:12 AM CDT  To: Edith Martinez NP

## 2024-09-04 NOTE — TELEPHONE ENCOUNTER
Called pt, no answer. Left voicemail to give the office a call back.  Appt letter mailed to pt home.

## 2024-09-06 ENCOUNTER — TELEPHONE (OUTPATIENT)
Dept: TRANSPLANT | Facility: CLINIC | Age: 69
End: 2024-09-06
Payer: MEDICARE

## 2024-09-06 ENCOUNTER — COMMITTEE REVIEW (OUTPATIENT)
Dept: TRANSPLANT | Facility: CLINIC | Age: 69
End: 2024-09-06
Payer: MEDICARE

## 2024-09-06 NOTE — COMMITTEE REVIEW
Native Organ Dx: Diabetes Mellitus - Type II      Not approved for LRD/CAD transplant due to lack of a sold caregiver plan as well as financial support. Patient may be re-referred when the above issues have been resolved.      will reach out to patient today to confirm if he has resolved the above mentioned issues. If issues have been resolved, patient can continue with the evaluation. If the issues are not resoled, his chart will be closed.       Note written by: Michelle Abadie, RN     ===============================================    I was present at the meeting and attest to the decision of the committee

## 2024-09-06 NOTE — TELEPHONE ENCOUNTER
SW followed up with pt's wife regarding caregiver plan update. Pt's wife reports pt may have this information but he is not available to speak at the moment. SW STRONGLY encouraged pt's wife to have pt contact SW to discuss plan. Pt's wife verbalized understanding and agreement.

## 2024-09-10 ENCOUNTER — TELEPHONE (OUTPATIENT)
Dept: TRANSPLANT | Facility: CLINIC | Age: 69
End: 2024-09-10
Payer: MEDICARE

## 2024-09-10 ENCOUNTER — DOCUMENTATION ONLY (OUTPATIENT)
Dept: TRANSPLANT | Facility: CLINIC | Age: 69
End: 2024-09-10
Payer: MEDICARE

## 2024-09-10 NOTE — TELEPHONE ENCOUNTER
----- Message from RACHNA Rivas LMSW sent at 9/10/2024 12:45 PM CDT -----  Pt is unacceptable for transplant at this time due to absence of primary caregiver pan. Pt will need to present in clinic with primary caregiver at next referral.  ----- Message -----  From: Abadie, Michelle, RN  Sent: 9/10/2024  10:05 AM CDT  To: RACHNA Rivas LMSW    Please confirm status as I need to complete the note.  ----- Message -----  From: Denia Ibanez MSW, LMSW  Sent: 9/6/2024   2:58 PM CDT  To: Michelle Abadie, RN    I think this fair.  ----- Message -----  From: Abadie, Michelle, RN  Sent: 9/6/2024   1:41 PM CDT  To: RACHNA Rivas LMSW    Per committee, we were giving until this afternoon. Do you think we should hold until Monday for a definitive answer?  ----- Message -----  From: Denia Ibanez MSW, LMSW  Sent: 9/6/2024   1:35 PM CDT  To: Michelle Abadie, RN    SW followed up with pt's wife regarding caregiver plan update. Pt's wife reports pt may have this information but he is not available to speak at the moment. SW STRONGLY encouraged pt's wife to have pt contact SW to discuss plan. Pt's wife verbalized understanding and agreement.

## 2024-09-12 ENCOUNTER — TELEPHONE (OUTPATIENT)
Dept: INFECTIOUS DISEASES | Facility: CLINIC | Age: 69
End: 2024-09-12
Payer: MEDICARE

## 2025-01-31 ENCOUNTER — TELEPHONE (OUTPATIENT)
Dept: TRANSPLANT | Facility: CLINIC | Age: 70
End: 2025-01-31
Payer: MEDICARE

## 2025-01-31 NOTE — TELEPHONE ENCOUNTER
Contacted patient to review initial intake information. Patient reports the followin. Can you walk up a flight of stairs without getting short of breath or stopping? Yes    2. Can you walk one block without getting short of breath or having to stop? Yes   3. Do you use oxygen? No   4. Do you use a cane, walker, or wheel chair to assist in mobility? No   5. Have you been hospitalized or had recent surgery in the last 6 months? No    A. Stroke   B. Heart surgery or heart catheterization   C. Broken bone  6. Do you have any cuts, open sores (ulcers), or wounds anywhere on your body? No    7. Do you go to dialysis? No   8. Preferred appointment day? Anyday   9. Caregiver? Wife (Gina)    Patient is aware the next steps will include completing records and compliance verification. Patient is aware once provider review and insurance authorization is received we will contact patient to schedule initial visit. Patient is aware that initial visit will begin prior to / at 7 am and will conclude at approximately 3 pm on date of appointment. All questions answered at this time.

## 2025-02-07 ENCOUNTER — TELEPHONE (OUTPATIENT)
Dept: TRANSPLANT | Facility: CLINIC | Age: 70
End: 2025-02-07
Payer: MEDICARE

## 2025-02-07 NOTE — TELEPHONE ENCOUNTER
SW has attempted to reach the patient and his wife multiple times this past week at listed phone numbers ([phone]; [phone]) in Epic.  SW has left multiple messages requesting a call back.  SW yet to receive a return call from pt and/or pt's wife.  PARAS provided this update to nurse transplant referral coordinator M. Abadie, RN.  SW remains available for further f/u as needed.    ----- Message from Michelle Abadie sent at 1/31/2025  4:57 PM CST -----  Hi,    We have received a referral on the above patient. He was declined in evaluation on 09/10/24 :It is with regret I inform you that you are not approved as a transplant candidate due to lack of a solid caregiver plan.  If you would like to be reconsidered for kidney transplant evaluation in the future, you must establish a solid caregiver plan.    A SW review is needed before we can process this referral.    Thank you,  Tram

## 2025-02-14 ENCOUNTER — TELEPHONE (OUTPATIENT)
Dept: TRANSPLANT | Facility: CLINIC | Age: 70
End: 2025-02-14
Payer: MEDICARE

## 2025-02-14 NOTE — TELEPHONE ENCOUNTER
Transplant SW notified by kidney transplant referral RN coordinator M. Abadie, RN of referral being received for pt's transplant evaluation.  Due to evaluation previously being declined due to lack of adequate transplant caregiver and financial support.  SW able to reach pt's wife, Gina Taylor ([phone]) to review prior to pt being scheduled for another evaluation.    Per pt's wife, additional family members have been identified as being available to serve as back-up caregivers to ensure pt will have 24/7 transplant caregiver support.  Ms. Ashok Taylor confirms still having to continue working and provide care to her minor daughter at time of pt's transplant.  However, she expresses confidence in availability of other relatives to supplement pt's caregiver support as needed.  Ms. Ashok Taylor also reports household financial situation has improved since she has returned to work regularly and denies having any psychosocial concerns regarding pt undergoing transplant at this time.  SW instructed Ms. Ashok Taylor to speak with those relatives / caregivers who will be available to patient and make arrangements for them to attend pt's transplant clinic evaluation appointments when scheduled.  Ms. Ashok Taylor expressed understanding and agreement regarding same.  All above updates provided to RN referral coordinator.

## 2025-02-24 ENCOUNTER — TELEPHONE (OUTPATIENT)
Dept: TRANSPLANT | Facility: CLINIC | Age: 70
End: 2025-02-24
Payer: MEDICARE

## 2025-03-03 ENCOUNTER — TELEPHONE (OUTPATIENT)
Dept: TRANSPLANT | Facility: CLINIC | Age: 70
End: 2025-03-03
Payer: MEDICARE

## 2025-03-27 ENCOUNTER — TELEPHONE (OUTPATIENT)
Dept: TRANSPLANT | Facility: CLINIC | Age: 70
End: 2025-03-27
Payer: MEDICARE

## 2025-03-28 ENCOUNTER — TELEPHONE (OUTPATIENT)
Dept: TRANSPLANT | Facility: CLINIC | Age: 70
End: 2025-03-28
Payer: MEDICARE

## 2025-03-28 NOTE — TELEPHONE ENCOUNTER
MA notes per Pre Dialysis adherence form     FOR THE PAST THREE MONTHS:    0-Appt Adhrence  0-No show    No concerns with labs, care giving, transportation, or mental health    Scanned in pt's media     Dia Horn  Abdominal Transplant MA

## 2025-04-15 ENCOUNTER — TELEPHONE (OUTPATIENT)
Dept: TRANSPLANT | Facility: CLINIC | Age: 70
End: 2025-04-15
Payer: MEDICARE

## 2025-05-13 ENCOUNTER — TELEPHONE (OUTPATIENT)
Dept: TRANSPLANT | Facility: CLINIC | Age: 70
End: 2025-05-13
Payer: MEDICARE

## 2025-05-14 DIAGNOSIS — Z76.82 ORGAN TRANSPLANT CANDIDATE: ICD-10-CM

## 2025-05-14 DIAGNOSIS — N18.6 END STAGE RENAL DISEASE: ICD-10-CM

## 2025-05-14 DIAGNOSIS — Z91.89 CARDIOVASCULAR EVENT RISK: Primary | ICD-10-CM

## 2025-06-04 ENCOUNTER — TELEPHONE (OUTPATIENT)
Dept: TRANSPLANT | Facility: CLINIC | Age: 70
End: 2025-06-04
Payer: MEDICARE

## 2025-06-17 DIAGNOSIS — N18.6 END STAGE RENAL DISEASE: ICD-10-CM

## 2025-06-17 DIAGNOSIS — Z91.89 CARDIOVASCULAR EVENT RISK: Primary | ICD-10-CM

## 2025-06-17 DIAGNOSIS — Z76.82 ORGAN TRANSPLANT CANDIDATE: ICD-10-CM

## 2025-07-18 ENCOUNTER — TELEPHONE (OUTPATIENT)
Dept: TRANSPLANT | Facility: CLINIC | Age: 70
End: 2025-07-18
Payer: MEDICARE

## 2025-07-23 ENCOUNTER — OFFICE VISIT (OUTPATIENT)
Dept: TRANSPLANT | Facility: CLINIC | Age: 70
End: 2025-07-23
Payer: MEDICARE

## 2025-07-23 ENCOUNTER — HOSPITAL ENCOUNTER (OUTPATIENT)
Dept: RADIOLOGY | Facility: HOSPITAL | Age: 70
Discharge: HOME | End: 2025-07-23
Payer: MEDICARE

## 2025-07-23 VITALS
BODY MASS INDEX: 25.79 KG/M2 | WEIGHT: 160.5 LBS | TEMPERATURE: 97 F | OXYGEN SATURATION: 100 % | HEIGHT: 66 IN | DIASTOLIC BLOOD PRESSURE: 78 MMHG | RESPIRATION RATE: 18 BRPM | SYSTOLIC BLOOD PRESSURE: 181 MMHG | HEART RATE: 82 BPM

## 2025-07-23 DIAGNOSIS — N18.4 CKD (CHRONIC KIDNEY DISEASE), STAGE IV: ICD-10-CM

## 2025-07-23 DIAGNOSIS — E78.5 HYPERLIPIDEMIA, UNSPECIFIED HYPERLIPIDEMIA TYPE: Chronic | ICD-10-CM

## 2025-07-23 DIAGNOSIS — D63.1 ANEMIA IN STAGE 4 CHRONIC KIDNEY DISEASE: ICD-10-CM

## 2025-07-23 DIAGNOSIS — Z01.818 PRE-TRANSPLANT EVALUATION FOR KIDNEY TRANSPLANT: Primary | ICD-10-CM

## 2025-07-23 DIAGNOSIS — E11.65 TYPE 2 DIABETES MELLITUS WITH HYPERGLYCEMIA, WITHOUT LONG-TERM CURRENT USE OF INSULIN: Chronic | ICD-10-CM

## 2025-07-23 DIAGNOSIS — Z76.82 ORGAN TRANSPLANT CANDIDATE: ICD-10-CM

## 2025-07-23 DIAGNOSIS — N18.4 ANEMIA IN STAGE 4 CHRONIC KIDNEY DISEASE: ICD-10-CM

## 2025-07-23 DIAGNOSIS — Z90.79 HISTORY OF ROBOT-ASSISTED LAPAROSCOPIC RADICAL PROSTATECTOMY: ICD-10-CM

## 2025-07-23 PROCEDURE — 71046 X-RAY EXAM CHEST 2 VIEWS: CPT | Mod: 26,TXP,, | Performed by: RADIOLOGY

## 2025-07-23 PROCEDURE — 99999 PR PBB SHADOW E&M-EST. PATIENT-LVL V: CPT | Mod: PBBFAC,TXP,,

## 2025-07-23 PROCEDURE — 99214 OFFICE O/P EST MOD 30 MIN: CPT | Mod: S$GLB,TXP,, | Performed by: SURGERY

## 2025-07-23 PROCEDURE — 71046 X-RAY EXAM CHEST 2 VIEWS: CPT | Mod: TC,TXP

## 2025-07-23 RX ORDER — CARVEDILOL 6.25 MG/1
6.25 TABLET ORAL 2 TIMES DAILY
COMMUNITY
Start: 2025-06-18

## 2025-07-23 NOTE — PROGRESS NOTES
Transplant Nephrology  Kidney Transplant Recipient Evaluation    Referring Physician: Crys Lee  Current Nephrologist: Crys Lee    Subjective:   CC:  Initial evaluation of kidney transplant candidacy.    HPI:  Mr. Martinez is a 70 y.o. year old Black ([place]) male who has presented to be evaluated as a potential kidney transplant recipient.  He has advanced kidney disease secondary to presumed diabetic nephropathy and HTN, no renal biopsy.  Patient is currently pre-dialysis, does not have a dialysis access.     He requires use of [place] [employer] . CloudCheck[employer] () used to complete the visit. He speaks very minimal English.     DM2 and HTN for about 25 years. Denies retinopathy, neuropathy, or gastroparesis. Well controlled with most recent A1c 6.1 about 1 year ago.    Diagnosed with prostate CA in 2013 and was set up for a prostatectomy but was then lost to follow up as he was in Psychiatric for several years. Returned to urology in 2017 requesting to get done. Underwent robotic assisted radical retropubic prostatectomy on 5/19/2017.   FINAL PATH REPORT: pT2c, N0/5, Mx  Fishers's score 3 + 4 = 7/10  Grade group - 2   Last PSA 8/29/2024 < 0.01  Has not bee to see his urologist.      Hospital admit 2021 for uncontrolled HTN. Found to have urinary retention. Patient required bedside dilation and carty placement. Later had emergent cystoscopy for R sided hydronephrosis  with R ureteral stent placement. Mucous plug removed at R UVJ during procedure. Stent later removed and passed voiding trial. He reports that he has no problems passing urine now.    Remains active, works as a . Uses no assistive devices. Looks good, not frail.    Does have a TIA history. No MI, CVA, DVT/PE history. He has been treated for syphilis in the past.     Plans to discuss living donation with family and friends.    He was previous evaluated but evaluation stopped due to lack of caregiver support at that time    Current  Outpatient Medications   Medication Sig Dispense Refill    aspirin 81 MG Chew Take 81 mg by mouth once daily.      blood sugar diagnostic Strp 1 each by Misc.(Non-Drug; Combo Route) route 3 (three) times daily. 90 each 2    blood-glucose meter kit Check blood sugar three times daily and keep a log of your results to take to your primary care provider. 1 each 0    captopriL (CAPOTEN) 50 MG tablet Take 50 mg by mouth 2 (two) times daily.      carvediloL (COREG) 6.25 MG tablet Take 6.25 mg by mouth 2 (two) times daily.      cloNIDine (CATAPRES) 0.1 MG tablet Take 0.1 mg by mouth 2 (two) times daily.      diltiaZEM (TIAZAC) 240 MG Cs24 Take 240 mg by mouth once daily.      doxazosin (CARDURA) 4 MG tablet Take 8 mg by mouth every evening.      dulaglutide (TRULICITY) 0.75 mg/0.5 mL pen injector Inject 0.75 mg into the skin every 7 days. Mondays      FARXIGA 10 mg tablet Take 10 mg by mouth once daily.      ferrous gluconate (FERGON) 324 MG tablet Take 1 tablet (324 mg total) by mouth 2 (two) times daily before meals. (Patient taking differently: Take 324 mg by mouth daily with breakfast.) 120 tablet 2    multivit-min-FA-lycopen-lutein (CENTRUM SILVER MEN) 300-600-300 mcg Tab Take 1 tablet by mouth once daily. 90 tablet 3    ondansetron (ZOFRAN-ODT) 8 MG TbDL Take 1 tablet (8 mg total) by mouth every 8 (eight) hours as needed (nausea). 30 tablet 1    pravastatin (PRAVACHOL) 40 MG tablet Take 40 mg by mouth once daily.      TRADJENTA 5 mg Tab tablet Take 5 mg by mouth once daily.       No current facility-administered medications for this visit.       Past Medical History:   Diagnosis Date    Anemia     Cataract     COVID-19     Diabetes mellitus, type 2     Disorder of kidney and ureter     History of transient ischemic attack (TIA)     Hyperlipidemia     Hypertension 2004    Obstructive uropathy     Personal history of transient ischemic attack     Prostate cancer     Sickle cell trait     Syphilis, unspecified      "Transient cerebral ischemic attack     Vitamin D deficiency        Past Medical and Surgical History: Mr. Martinez  has a past medical history of Anemia, Cataract, COVID-19, Diabetes mellitus, type 2, Disorder of kidney and ureter, History of transient ischemic attack (TIA), Hyperlipidemia, Hypertension, Obstructive uropathy, Personal history of transient ischemic attack, Prostate cancer, Sickle cell trait, Syphilis, unspecified, Transient cerebral ischemic attack, and Vitamin D deficiency.  He has a past surgical history that includes Cataract extraction w/  intraocular lens implant (Right, 07/24/2020); Cataract extraction w/  intraocular lens implant (Left, 08/04/2020); Prostatectomy (05/19/2017); Cystoscopy w/ ureteral stent placement (N/A, 06/13/2021); Eye surgery; and prostatectomy, radical, laparoscopic, retropubic approach.    Past Social and Family History: Mr. Martinez reports that he has never smoked. He has never used smokeless tobacco. He reports that he does not drink alcohol and does not use drugs. His family history includes Asthma in his father; Diabetes in his father and mother; Hyperlipidemia in his father and mother.    Review of Systems   Constitutional:  Negative for activity change and fever.   Eyes:  Positive for visual disturbance.        Wears glasses   Respiratory:  Negative for cough and shortness of breath.    Cardiovascular:  Negative for chest pain and leg swelling.   Gastrointestinal:  Negative for abdominal pain, constipation, diarrhea and nausea.   Genitourinary:  Negative for difficulty urinating, frequency and hematuria.   Musculoskeletal:  Negative for arthralgias and myalgias.   Skin:  Negative for wound.   Neurological:  Negative for weakness.   Psychiatric/Behavioral:  Negative for sleep disturbance.        Objective:   Blood pressure (!) 182/86, pulse 83, temperature 97.2 °F (36.2 °C), temperature source Temporal, resp. rate 18, height 5' 6.3" (1.684 m), weight 72.8 kg (160 lb " 7.9 oz), SpO2 100%.body mass index is 25.67 kg/m².    Physical Exam  Vitals and nursing note reviewed.   Constitutional:       Appearance: Normal appearance.   Cardiovascular:      Rate and Rhythm: Normal rate and regular rhythm.      Heart sounds: Normal heart sounds.   Pulmonary:      Effort: Pulmonary effort is normal.      Breath sounds: Normal breath sounds.   Abdominal:      General: There is no distension.   Musculoskeletal:         General: Normal range of motion.   Skin:     General: Skin is warm and dry.   Neurological:      Mental Status: He is alert.         Labs:  Lab Results   Component Value Date    WBC 5.9 12/17/2024    HGB 12.1 (L) 12/17/2024    HCT 36.7 (L) 12/17/2024     06/16/2025    K 5.1 06/16/2025     08/29/2024    CO2 23 (L) 06/16/2025    BUN 35.1 (H) 06/16/2025    CREATININE 3.85 (H) 06/16/2025    EGFRNORACEVR 16 (L) 06/16/2025    GLUCOSE 353 (H) 06/16/2025    CALCIUM 8.5 06/16/2025    PHOS 3.7 08/29/2024    MG 1.7 10/14/2021    ALBUMIN 4.0 06/16/2025    AST 9 06/16/2025    ALT 9 06/16/2025    UTPCR 3.57 (H) 06/13/2021    .6 (H) 08/29/2024       Lab Results   Component Value Date    BILIRUBINUA Negative 10/14/2021    LIPASE 22 06/13/2021    PROTEINUA 1+ (A) 10/14/2021    NITRITE Negative 10/14/2021    RBCUA 0 10/14/2021    WBCUA 0 10/14/2021     Labs were reviewed with the patient.    Assessment:     1. Pre-transplant evaluation for kidney transplant    2. CKD (chronic kidney disease), stage IV    3. History of robot-assisted laparoscopic radical prostatectomy    4. Anemia in stage 4 chronic kidney disease    5. Type 2 diabetes mellitus with hyperglycemia, without long-term current use of insulin    6. Hyperlipidemia, unspecified hyperlipidemia type      Plan:   Mr. Martinez is a 70 y.o. year old Black (Martiniquais) male who has presented to be evaluated as a potential kidney transplant recipient.  He has advanced kidney disease secondary to presumed diabetic nephropathy and  HTN, no renal biopsy.  Patient is currently pre-dialysis    Prior to Listing, will need the following items to be completed:  1. Standard serologies, cardiac and imaging studies   2. Last Renal Us with right lower pole complex cyst with peripheral nodular component versus hemorrhagic debris --- Needs Urology evaluation--- not on dialysis yet  3. Needs follow-up with Urologist--history of prostate CA    Transplant Candidacy:   Based on available information, Mr. Martinez is a suitable kidney transplant candidate.   Meets center eligibility for accepting HCV+ donor offer - Yes.  Patient educated on HCV+ donors. [facility] is willing to accept HCV+ donor offer - Yes   Patient is a candidate for KDPI > 85 kidney donor offer - Yes.  Final determination of transplant candidacy will be made once workup is complete and reviewed by the selection committee.    Patient advised that it is recommended that all transplant candidates, and their close contacts and household members receive Covid vaccination.    UNOS Patient Status  Functional Status: 90% - Able to carry on normal activity: minor symptoms of disease    Ashley Portillo NP

## 2025-07-23 NOTE — PROGRESS NOTES
PHARM.D. PRE-TRANSPLANT NOTE:    This patient's medication therapy was evaluated as part of his pre-transplant evaluation.      The following general pharmacologic concerns were noted: Aspirin will increase periop bleeding risk;     The following concerns for post-operative pain management were noted: N/A    The following pharmacologic concerns related to HCV therapy were noted: N/A      This patient's medication profile was reviewed for considerations for DAA Hepatitis C therapy:    [x]  No current inducers of CYP 3A4 or PGP  [x]  No amiodarone on this patient's EMR profile in the last 24 months  [x]  No past or current atrial fibrillation on this patient's EMR profile       Current Medications[1]        I am available for consultation and can be contacted, as needed by the other members of the Transplant team.          [1]   Current Outpatient Medications   Medication Sig Dispense Refill    aspirin 81 MG Chew Take 81 mg by mouth once daily.      blood sugar diagnostic Strp 1 each by Misc.(Non-Drug; Combo Route) route 3 (three) times daily. 90 each 2    blood-glucose meter kit Check blood sugar three times daily and keep a log of your results to take to your primary care provider. 1 each 0    captopriL (CAPOTEN) 50 MG tablet Take 50 mg by mouth 2 (two) times daily.      carvediloL (COREG) 6.25 MG tablet Take 6.25 mg by mouth 2 (two) times daily.      cloNIDine (CATAPRES) 0.1 MG tablet Take 0.1 mg by mouth 2 (two) times daily.      diltiaZEM (TIAZAC) 240 MG Cs24 Take 240 mg by mouth once daily.      doxazosin (CARDURA) 4 MG tablet Take 8 mg by mouth every evening.      dulaglutide (TRULICITY) 0.75 mg/0.5 mL pen injector Inject 0.75 mg into the skin every 7 days. Mondays      FARXIGA 10 mg tablet Take 10 mg by mouth once daily.      ferrous gluconate (FERGON) 324 MG tablet Take 1 tablet (324 mg total) by mouth 2 (two) times daily before meals. (Patient taking differently: Take 324 mg by mouth daily with breakfast.)  120 tablet 2    multivit-min-FA-lycopen-lutein (CENTRUM SILVER MEN) 300-600-300 mcg Tab Take 1 tablet by mouth once daily. 90 tablet 3    ondansetron (ZOFRAN-ODT) 8 MG TbDL Take 1 tablet (8 mg total) by mouth every 8 (eight) hours as needed (nausea). 30 tablet 1    pravastatin (PRAVACHOL) 40 MG tablet Take 40 mg by mouth once daily.      TRADJENTA 5 mg Tab tablet Take 5 mg by mouth once daily.       No current facility-administered medications for this visit.

## 2025-07-23 NOTE — PROGRESS NOTES
PRE-TRANSPLANT INFECTIOUS DISEASE CONSULT    Reason for Visit:  Pre-transplant evaluation  Referring Provider: Dr. Rohan Lambert     History of Present Illness:    70 y.o. male with a history of CKD4, T2DM, TIA presents for pre-kidney transplant evaluation.    Infectious History:  Recent hospital admissions: No  Recent infections: No  Recent or current antibiotic use: No  History of recurrent infections *(sinus / pneumonia / UTI / SBP)*: No  History of diabetic foot wound or bone/joint infection: No  Recent dental infections, issues or procedures: No  History of chicken pox: No  History of shingles: No  History of STI: No    History of Immunosuppression:  Prior chemotherapy / immunosuppression: No  Prior transplant: No  History of splenectomy: No    Tuberculosis:  Prior screening for latent TB: No  Prior diagnosis of latent TB: No  Risk factors for TB *known exposure, incarceration, homelessness*: No    Geographical exposures:  Currently lives in [place], [place] with wife, daughter 16  Lived in the following states: [place], [place], [place]  Lived or travelled to the [facility] [place]: No  International travel: Yes, has been living in [place] years from [place], [place], [place]   Travel-associated illness: No    Social/Environmental:  Occupation:     Pets: No   Livestock: No  Fishing / hunting: No  Hobbies: NA  Water: [place] water  Consumption of raw/undercooked meat or seafood?  No  Tobacco: No  Alcohol: No  Recreational drug use:  No  Sexual partners: wife       Past Histories:   Past Medical History:   Diagnosis Date    Anemia     Cataract     COVID-19     Diabetes mellitus, type 2     Disorder of kidney and ureter     History of transient ischemic attack (TIA)     Hyperlipidemia     Hypertension 2004    Obstructive uropathy     Personal history of transient ischemic attack     Prostate cancer     Sickle cell trait     Syphilis, unspecified     Transient cerebral ischemic attack     Vitamin D deficiency       Past Surgical History:   Procedure Laterality Date    CATARACT EXTRACTION W/  INTRAOCULAR LENS IMPLANT Right 07/24/2020    CATARACT EXTRACTION W/  INTRAOCULAR LENS IMPLANT Left 08/04/2020    CYSTOSCOPY W/ URETERAL STENT PLACEMENT N/A 06/13/2021    Procedure: CYSTOSCOPY, WITH URETERAL STENT INSERTION;  Surgeon: APOORVA Salazar MD;  Location: [facility];  Service: Urology;  Laterality: N/A;    EYE SURGERY      PROSTATECTOMY  05/19/2017    PROSTATECTOMY, RADICAL, LAPAROSCOPIC, RETROPUBIC APPROACH       Family History   Problem Relation Name Age of Onset    Diabetes Mother      Hyperlipidemia Mother      Diabetes Father      Hyperlipidemia Father      Asthma Father       Social History[1]  Review of patient's allergies indicates:  No Known Allergies      Current antibiotics:  Antibiotics (From admission, onward)      None              Review of Systems  Review of Systems   Constitutional: Negative for chills, fever and weight loss.   Respiratory:  Negative for cough and hemoptysis.    Skin:  Negative for poor wound healing, rash and suspicious lesions.   Gastrointestinal:  Negative for diarrhea, nausea and vomiting.   Genitourinary:  Negative for dysuria.          Objective  Physical Exam  Vitals and nursing note reviewed.   Constitutional:       General: He is not in acute distress.     Appearance: Normal appearance. He is well-developed. He is not ill-appearing or diaphoretic.   HENT:      Head: Normocephalic and atraumatic.      Nose: Nose normal. No congestion.      Mouth/Throat:      Dentition: Normal dentition. Does not have dentures. No dental caries or dental abscesses.   Eyes:      General: No scleral icterus.     Conjunctiva/sclera: Conjunctivae normal.   Cardiovascular:      Rate and Rhythm: Normal rate and regular rhythm.   Pulmonary:      Effort: Pulmonary effort is normal. No respiratory distress.      Breath sounds: No stridor.   Abdominal:      General: Abdomen is flat. There is no distension.       "Palpations: Abdomen is soft.   Musculoskeletal:      Cervical back: Normal range of motion.   Skin:     General: Skin is warm and dry.      Findings: No erythema, lesion or rash.   Neurological:      General: No focal deficit present.      Mental Status: He is alert and oriented to person, place, and time.      Motor: No weakness.      Gait: Gait normal.   Psychiatric:         Mood and Affect: Mood normal.         Behavior: Behavior normal.         Thought Content: Thought content normal.         Judgment: Judgment normal.           Labs:    CBC:   Lab Results   Component Value Date    WBC 5.9 12/17/2024    HGB 12.1 (L) 12/17/2024    HCT 36.7 (L) 12/17/2024    MCV 94.1 12/17/2024     08/29/2024    GRAN 2.9 08/29/2024    GRAN 53.7 08/29/2024    LYMPH 2.0 08/29/2024    LYMPH 36.5 08/29/2024    MONO 0.4 08/29/2024    MONO 7.0 08/29/2024    EOSINOPHIL 1.5 08/29/2024       Syphilis screening:   Lab Results   Component Value Date    RPR Reactive (A) 08/29/2024    PRPQ 1:1 (A) 08/29/2024    TREPABIGMIGG Reactive (A) 08/29/2024        TB screening: No results found for: "QUANTTBGDPL", "TSPOTSCREN"    HIV screening:   Lab Results   Component Value Date    [id number] Non-reactive 08/29/2024       Strongyloides IgG: No results found for: "STRNGS"    Hepatitis Serologies:   Lab Results   Component Value Date    HEPAIGG Reactive 08/29/2024    HEPBCAB Reactive (A) 08/29/2024    HEPBSAB 401.58 08/29/2024    HEPBSAB Reactive 08/29/2024    HEPCAB Non-reactive 08/29/2024        Varicella IgG:   Lab Results   Component Value Date    VARICELLAINT Positive 08/29/2024         Immunization History   Administered Date(s) Administered    COVID-19, MRNA, LN-S, PF (MODERNA FULL 0.5 ML DOSE) 06/11/2021, 01/05/2022    Pneumococcal Conjugate - 20 Valent 08/29/2024        Immunization History:  Received all childhood vaccines: Yes, though in [facility]   All household members receive annual flu vaccine: Yes  All household members are up to date " on COVID vaccine: Yes    Assessment and Plan    1. Risks of Infection: Available serologies were reviewed. No unusual risks of infection or significant barriers to transplantation were identified from the infectious disease standpoint given the information available at this time.    - Acute infectious issues: pt with positive TB and syphilis screening tests. Will schedule for follow up to discuss further.    - Pending serologies: NA   - Please call if any pending serologic testing is positive.    2. Immunizations:  Based on the patient's immunization history and serologies, the following immunizations are recommended:  - Hepatitis A    Patient does have immunity to hepatitis A    Vaccination ordered today: No. Reason for not ordering: Immunity demonstrated on serology   - Hepatitis B    Patient does have immunity to hepatitis B    Vaccination ordered today: No. Reason for not ordering: Immunity   - COVID    Current CDC vaccination recommendations were discussed with the patient   - Annual high dose influenza     Vaccination ordered today: No. Reason for not ordering: Vaccination out of stock / not in season   - Prevnar 20    Vaccination ordered today: No. Reason for not ordering: Vaccination up to date   - Tdap    Vaccination ordered today: Yes   - Shingrix    Vaccination ordered today: Yes    Recommended Pre-Transplant Immunization Schedule   Vaccine  0m 1m 2m 6m   Pneumococcal conjugate vaccine (Prevnar 20) X      Tetanus-diphtheria-pertussis (Tdap)* X      Hepatitis A Vaccine (Havrix)** X   X   Hepatitis B Vaccine (Heplisav)** X X     Influenza (annual) X      Zoster Recombinant Vaccine (Shingrix) X  X           *Administer booster every 10 years.       **Administer if no immunity demonstrated on serologies               Patient will receive vaccines at local pharmacy. A written prescription was provided for all vaccine doses.     3. Counseling:   I discussed with the patient the risk for increased susceptibility  to infections following transplantation including increased risk for infection right after transplant and if rejection should occur.  The patient has been counseled on the importance of vaccinations to decrease risk of infection and severe illness. Specific guidance has been provided to the patient regarding the patient's occupation, hobbies and activities to avoid future infectious complications.     4. Transplant Candidacy: Based on available information, there are no identified significant barriers to transplantation from an infectious disease standpoint.  Final determination of transplant candidacy will be made once evaluation is complete and reviewed by the Selection Committee.      Follow up with infectious disease as needed.       The total time for evaluation and management services performed on 07/23/2025 was greater than 30 minutes.                [1]   Social History  Tobacco Use    Smoking status: Never    Smokeless tobacco: Never   Substance Use Topics    Alcohol use: No    Drug use: No

## 2025-07-23 NOTE — PROGRESS NOTES
TRANSPLANT NUTRITIONAL ASSESSMENT    Referring Provider: Crys Lee     Reason for Visit: Pre-kidney transplant work-up (pre-dialysis)    Age: 70 y.o.  Sex: male    Problem List[1]  Past Medical History:   Diagnosis Date    Anemia     Cataract     COVID-19     Diabetes mellitus, type 2     Disorder of kidney and ureter     History of transient ischemic attack (TIA)     Hyperlipidemia     Hypertension 2004    Obstructive uropathy     Personal history of transient ischemic attack     Prostate cancer     Sickle cell trait     Syphilis, unspecified     Transient cerebral ischemic attack     Vitamin D deficiency      Lab Results   Component Value Date     (H) 08/29/2024    K 5.1 06/16/2025    K 4.7 08/29/2024    PHOS 3.7 08/29/2024    MG 1.7 10/14/2021    CHOL 157 08/29/2024    HDL 35 (L) 08/29/2024    TRIG 104 08/29/2024    ALBUMIN 4.0 06/16/2025    ALBUMIN 4.6 08/29/2024    HGBA1C 6.1 (H) 08/29/2024    CALCIUM 8.5 06/16/2025    CALCIUM 9.7 08/29/2024       Current Outpatient Medications   Medication Sig    aspirin 81 MG Chew Take 81 mg by mouth once daily.    blood sugar diagnostic Strp 1 each by Misc.(Non-Drug; Combo Route) route 3 (three) times daily.    blood-glucose meter kit Check blood sugar three times daily and keep a log of your results to take to your primary care provider.    captopriL (CAPOTEN) 50 MG tablet Take 50 mg by mouth 2 (two) times daily.    carvediloL (COREG) 6.25 MG tablet Take 6.25 mg by mouth 2 (two) times daily.    cloNIDine (CATAPRES) 0.1 MG tablet Take 0.1 mg by mouth 2 (two) times daily.    diltiaZEM (TIAZAC) 240 MG Cs24 Take 240 mg by mouth once daily.    doxazosin (CARDURA) 4 MG tablet Take 8 mg by mouth every evening.    dulaglutide (TRULICITY) 0.75 mg/0.5 mL pen injector Inject 0.75 mg into the skin every 7 days. Mondays    FARXIGA 10 mg tablet Take 10 mg by mouth once daily.    ferrous gluconate (FERGON) 324 MG tablet Take 1 tablet (324 mg total) by mouth 2 (two) times daily  "before meals. (Patient taking differently: Take 324 mg by mouth daily with breakfast.)    multivit-min-FA-lycopen-lutein (CENTRUM SILVER MEN) 300-600-300 mcg Tab Take 1 tablet by mouth once daily.    ondansetron (ZOFRAN-ODT) 8 MG TbDL Take 1 tablet (8 mg total) by mouth every 8 (eight) hours as needed (nausea).    pravastatin (PRAVACHOL) 40 MG tablet Take 40 mg by mouth once daily.    TRADJENTA 5 mg Tab tablet Take 5 mg by mouth once daily.     No current facility-administered medications for this visit.     Allergies: Patient has no known allergies.    Food Insecurity: Not on file        Ht Readings from Last 1 Encounters:   07/23/25 5' 6.3" (1.684 m)     Wt Readings from Last 1 Encounters:   07/23/25 72.8 kg (160 lb 7.9 oz)      BMI: Estimated body mass index is 25.67 kg/m² as calculated from the following:    Height as of this encounter: 5' 6.3" (1.684 m).    Weight as of this encounter: 72.8 kg (160 lb 7.9 oz).     Usual Weight: 162 lb   Weight Change/Time: stable   Current Diet: regular   Appetite/Current Intake: good   Exercise/Physical Activity: functional in ADLs   Nutritional/Herbal Supplements: none  Chewing/Swallowing Problems: none  Symptoms: none    Estimated Kcal Need: 1820 kcal/day (25 kcal/kg)   Estimated Protein Need: 58-66 gm/day (0.8-0.9 gm/kg)     Nutritional History:   Pt and caregiver present. Pt reports cooking at home often.     Diet Recall    Morning: eggs, grits     Midday: meat (chicken, fish, no beef or pork), rice, vegetable daily; also enjoys beans; no fruit intake     Evening: skips occasionally or similar meal to lunch     Snacks: rarely     Desserts: none     Beverages: 5-6 16.9 oz bottled water/day, cranberry juice     Seasonings: creole seasonings     Restaurants/Fast Food: rarely     Nutritional Diagnoses  Problem: food- and nutrition-related knowledge deficit  Etiology: RT lack of previous education on pre-kidney transplant nutrition recommendations  Symptoms: AEB diet recall and " questions from pt     Educational Need? yes  Barriers: none identified  Discussed with: patient and caregiver  Interventions: Patient taught nutrition information regarding Pre-kidney transplant work-up (pre-dialysis). Renal Nutrition Therapy packet reviewed (high/low food sources of K, Phos and protein, low sodium and fluid intake, emphasis on moderate protein intake). Encouraged physical activity daily, regular exercise as tolerated, stay mobile.   Goals/Recommendations: diet adherence  Actions Taken: instruct/provide written information  Patient and/or family comprehend instructions: yes  Outcome: Verbalizes understanding  Monitoring: Contact information provided, will f/u in clinic and communicate with the care team as needed.     Counseling Time: 20 minutes              [1]   Patient Active Problem List  Diagnosis    Hyperlipidemia    Type 2 diabetes mellitus with hyperglycemia, without long-term current use of insulin    Gastroesophageal reflux disease without esophagitis    Anemia in stage 4 chronic kidney disease    CKD (chronic kidney disease), stage IV    Hx urinary retention    Acute renal failure superimposed on stage 4 chronic kidney disease    Acute urinary obstruction    History of robot-assisted laparoscopic radical prostatectomy    TIA (transient ischemic attack)    Metabolic acidosis    Hydronephrosis    Pre-transplant evaluation for kidney transplant

## 2025-07-23 NOTE — PROGRESS NOTES
INITIAL PATIENT EDUCATION NOTE AA    Mr. Filippo Martinez was seen in pre-kidney transplant clinic for evaluation for kidney, kidney/pancreas or pancreas only transplant.  The patient attended an individual video education session that discussed/reviewed the following aspects of transplantation: evaluation including diagnostic and laboratory testing,(Chemistries, Hematology, Serologies including HIV and Hepatitis and HLA) required for transplantation and selection committee process, UNOS waitlist management/multiple listings, types of organs offered (KDPI < 85%, KDPI > 85%, PHS risk, DCD, HCV+, HIV+ for HIV+ recipients and enbloc/dual), financial aspects, surgical procedures, dietary instruction pre- and post-transplant, health maintenance pre- and post-transplant, post-transplant hospitalization and outpatient follow-up, potential to participate in a research protocol, and medication management and side effects.  A question and answer session was provided after the presentation.    The patient was seen by all members of the multi-disciplinary team to include: Nephrologist/CHEVY, Surgeon, , Transplant Coordinator, , Pharmacist and Dietician (if applicable).    The patient reviewed and signed all consents for evaluation which were witnessed and sent to scanning into the [facility] chart.    The patient was given an education book and plan for further evaluation based on his individual assessment.      The Patient was educated on OPTN policy change regarding race based eGFR. For Black or  Americans, this eGFR could have shown that their kidneys were working better than they were.    Because of this change, we are looking at everyones record and assessing waiting time for people who are eligible. We will be reviewing your medical records and will notify you if you are eligible. We also encouraged patient to provide span 20 labs that are not in our electronic medical  records    Reviewed program requirement for complete COVID vaccination with documentation prior to listing.  COVID education information reviewed with patient. Patient encouraged to be up to date on all vaccinations.     The patient was informed that the transplant team would manage immediate post op pain. If the patient requires long term pain management, they will need to have that pain management addressed by their PCP or previous provider who wrote for long term pain medicines.    The patient was encouraged to call with any questions or concerns.

## 2025-07-23 NOTE — PROGRESS NOTES
Transplant Surgery  Kidney Transplant Recipient Evaluation    Referring Physician: Crys Lee  Current Nephrologist: Crys Lee    Subjective:     Reason for Visit: evaluate transplant candidacy    History of Present Illness: Filippo Martinez is a 70 y.o. year old male undergoing transplant evaluation.    Dialysis History: Filippo is pre-dialysis.      Transplant History: N/A    Etiology of Renal Disease: Diabetes Mellitus - Type II (based on medical records from referral).    External provider notes reviewed: No    Review of Systems   Constitutional:  Negative for fatigue.   HENT:  Negative for drooling, postnasal drip and sore throat.    Eyes:  Negative for discharge and itching.   Respiratory:  Negative for choking and stridor.    Gastrointestinal:  Negative for rectal pain.   Endocrine: Negative for polydipsia.   Genitourinary:  Negative for enuresis and genital sores.   Musculoskeletal:  Negative for back pain, neck pain and neck stiffness.   Allergic/Immunologic: Negative for immunocompromised state.   Neurological:  Negative for facial asymmetry and numbness.   Hematological:  Negative for adenopathy.   Psychiatric/Behavioral:  Negative for behavioral problems, self-injury and suicidal ideas.    Objective:     Physical Exam:  Constitutional:   Vitals reviewed: yes   Well-nourished and well-groomed: yes  Eyes:   Sclerae icteric: no   Extraocular movements intact: yes  GI:    Bowel sounds normal: yes   Tenderness: no    If yes, quadrant/location: not applicable   Palpable masses: no    If yes, quadrant/location: not applicable   Hepatosplenomegaly: no   Ascites: no   Hernia: no    If yes, type/location: not applicable   Surgical scars: yes    If yes, type/location: robotic port sites for prostatectomy  Resp:   Effort normal: yes   Breath sounds normal: yes    CV:   Regular rate and rhythm: yes   Heart sounds normal: yes   Femoral pulses normal: yes   Extremities edematous: no  Skin:   Rashes or lesions  present: no    If yes, describe:not applicable   Jaundice:: no    Musculoskeletal:   Gait normal: yes   Strength normal: yes  Psych:   Oriented to person, place, and time: yes   Affect and mood normal: yes    Additional comments: not applicable    Diagnostics:  The following labs have been reviewed: CBC  CMP    Counseling: We provided Filippo Martinez with a group education session today.  We discussed kidney transplantation at length with him, including risks, potential complications, and alternatives in the management of his renal failure.  The discussion included complications related to anesthesia, bleeding, infection, primary nonfunction, and ATN.  I discussed the typical postoperative course, length of hospitalization, the need for long-term immunosuppression, and the need for long-term routine follow-up.  I discussed living-donor and -donor transplantation and the relative advantages and disadvantages of each.  I also discussed average waiting times for both living donation and  donation.  I discussed national and center-specific survival rates.  I also mentioned the potential benefit of multicenter listing to candidates listed with centers within more than one organ procurement organization.  All questions were answered.    Patient advised that it is recommended that all transplant candidates, and their close contacts and household members receive Covid vaccination.    Final determination of transplant candidacy will be made once evaluation is complete and reviewed by the Kidney & Kidney/Pancreas Selection Committee.    Coronavirus disease (COVID-19) caused by severe acute respiratory virus coronavirus 2 (SARS-C0V 2) is associated with increased mortality in solid organ transplant recipients (SOT) compared to non-transplant patients. Vaccine responses to vaccination are depressed against SARS-CoV2 compared to normal individuals but improve with third vaccination doses. Vaccination prior to  SOT provides both the best opportunity for transplant candidates to develop protective immunity and to reduce the risk of serious COVID19 infections post transplantation. Organ transplant candidates at [facility] will be required to receive SARS-CoV-2 vaccination prior to being listed with a an active status, whenever possible. Exceptions will be made for disability related reasons or for sincerely held Muslim beliefs.          Transplant Surgery - Candidacy   Assessment/Plan:   Filippo Martinez has end stage renal disease (ESRD) on dialysis. I see no surgical contraindication to placing a kidney transplant. Based on available information, Filippo Martinez is a suitable kidney transplant candidate.     We discussed surgical approaches. The patient is a suitable candidate for robotic transplant approach.    Additional testing to be completed according to the Written Order Guidelines for Adult Pre-kidney and Pancreas Transplant Evaluation (KI-02).  Interpretation of tests and discussion of patient management involves all members of the multidisciplinary transplant team.    Sergio Goodwin MD

## 2025-07-23 NOTE — PROGRESS NOTES
Transplant Recipient Adult Psychosocial Assessment    [facility]  [address]  [place] [address]  Telephone Information:   Mobile [phone]   Home  [phone] (home)  Work  There is no work phone number on file.  E-mail  No e-mail address on record    Sex: male  YOB: 1955  Age: 70 y.o.    Encounter Date: 7/23/2025  [place] Citizen: yes since 2001. [place] in [place].  Primary Language: English, Creole Ecuadorean. Pt reports is originally from [facility].    Needed: None. Pt was able to communicate well in English for SW initial psychosocial. Pt may benefit from creole Bots[place]an speaking  for medical appointments.    Emergency Contact:  Gina Taylor, 47 yo wife, [place] Guaman, does drive/own car, works full time full time as nurse at [employer]. [phone]    Family/Social Support:   Number of dependents/: 15 yo daughter, Ashley Saldaña. Family will assist with transplant childcare.  Marital history:  to Gina since 2007  Other family dynamics: Pt reports strong family support living nearby. Pt reports lives with supportive wife Gina and teenage daughter Ashley in [place]. Pt reports 3 supportive cousins living nearby. Pt reports 4 grown children living away and most likely will not be assisting with organ transplant.    Household Composition:  Gina Taylor, 47 yo wife, STEPHANIE Guaman, does drive/own car, works full time full time as nurse at [employer]. [phone]  15 yo daughter, Ashley Saldaña    Do you and your caregivers have access to reliable transportation? yes  PRIMARY CAREGIVER: Gina Taylor, wife, will be primary caregiver, phone number  [phone]    Wife was not present for organ transplant evaluation today. Transplant caregiver/transportation plan confirmed by telephone with pt's wife Gina Taylor.     provided in-depth information to patient and caregiver regarding pre- and post-transplant  caregiver role.   strongly encourages patient and caregiver to have concrete plan regarding post-transplant care giving, including back-up caregiver(s) to ensure care giving needs are met as needed.    Patient and Caregiver states understanding all aspects of caregiver role/commitment and is able/willing/committed to being caregiver to the fullest extent necessary.    Patient and Caregiver verbalizes understanding of the education provided today and caregiver responsibilities.         remains available. Patient and Caregiver agree to contact  in a timely manner if concerns arise.      Able to take time off work without financial concerns: yes.     Additional Significant Others who will Assist with Transplant:  Jose Luis Valdez, 52 yo cousin, [place], does drive/own car. [phone]  Jus Blanchard, 57 yo cousin, [place], does drive/own car. [phone]  Magnus Saldaña, 68 yo cousin, [place], does not drive. [phone]    Living Will: no  Healthcare Power of : no  Advance Directives on file: <<no information> per medical record.  Verbally reviewed LW/HCPA information.   provided patient with copy of LW/HCPA documents and provided education on completion of forms.    Living Donors: Education and resource information given to patient.    Highest Education Level: completed 9th grade. Speaks both English and Creole French  Reading Ability: 9th grade. Pt reports completed own English medical paperwork today.  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    Spouse/Significant Other Employment: Pt reports wife works full time full time as nurse at [employer].    Disabled: no    Pt's yearly income:  $17,000  Able to afford all costs now and  if transplanted, including medications: yes  Patient and Caregiver verbalizes understanding of personal responsibilities related to transplant costs and the importance of having a financial plan to ensure that patients transplant costs are fully covered.       provided fundraising information/education. Patient and Caregiververbalizes understanding.   remains available.    Insurance:   Payer/Plan Subscr  Sex Relation Sub. Ins. ID Effective Group Num   1. HU[employer] MANAGE* RAJNI ALEXANDER 1955 Male Self [id number] 1/1/21 [id number]                                   [address] [address]   2. [place]* RAJNI ALEXANDER 1955 Male Self [id number]* 25                                    [address] [address]     Primary Insurance (for UNOS reporting): Public Insurance - Medicare & Choice [employer] Managed Care  Secondary Insurance (for UNOS reporting): Public Insurance - Medicaid  Patient and Caregiver verbalizes clear understanding that patient may experience difficulty obtaining and/or be denied insurance coverage post-surgery. This includes and is not limited to disability insurance, life insurance, health insurance, burial insurance, long term care insurance, and other insurances.      Patient and Caregiver also reports understanding that future health concerns related to or unrelated to transplantation may not be covered by patient's insurance.  Resources and information provided and reviewed.     Patient and Caregiver provides verbal permission to release any necessary information to outside resources for patient care and discharge planning.  Resources and information provided are reviewed.      Dialysis Adherence: Pt reports is not on dialysis at this time.    Infusion Service: patient utilizing? no  Home Health: patient utilizing? no  DME: no  Pulmonary/Cardiac Rehab: pt denies  ADLS:  independent     Adherence:   Pt reports high medical compliance with appointments and instructions last 3  months. 7-18-25 nephrology compliance update shows suitable compliance.  Adherence education and counseling provided.     Per History Section:  Past Medical History:   Diagnosis Date    Anemia     Cataract     COVID-19     Diabetes mellitus, type 2     Disorder of kidney and ureter     History of transient ischemic attack (TIA)     Hyperlipidemia     Hypertension 2004    Obstructive uropathy     Personal history of transient ischemic attack     Prostate cancer     Sickle cell trait     Syphilis, unspecified     Transient cerebral ischemic attack     Vitamin D deficiency      Social History     Tobacco Use    Smoking status: Never    Smokeless tobacco: Never   Substance Use Topics    Alcohol use: No     Social History     Substance and Sexual Activity   Drug Use No     Social History     Substance and Sexual Activity   Sexual Activity Yes    Partners: Female       Per Today's Psychosocial:  Tobacco: none, patient denies any use.  Alcohol: none, patient denies any use.  Illicit Drugs/Non-prescribed Medications: none, patient denies any use.    Patient and Caregiver states clear understanding of the potential impact of substance use as it relates to transplant candidacy and is aware of possible random substance screening.  Substance abstinence/cessation counseling, education and resources provided and reviewed.     Arrests/DWI/Treatment/Rehab: patient denies    Psychiatric History:    Mental Health: Pt denies any mental health history or current mental health struggles. Pt denies any need for mental health referral at this time.  Psychiatrist/Counselor: pt denies  Medications:  pt denies  Suicide/Homicide Issues: pt denies any si/hi history  Safety at home: pt reports living in safe home environment with no abuse at this time.    Knowledge: Patient and Caregiver states having clear understanding and realistic expectations regarding the potential risks and potential benefits of organ transplantation and organ donation  and agrees to discuss with health care team members and support system members, as well as to utilize available resources and express questions and/or concerns in order to further facilitate the pt informed decision-making.  Resources and information provided and reviewed.    Patient and Caregiver is aware of [facility]'s affiliation and/or partnership with agencies in home health care, LTAC, SNF, [facility], and other hospitals and clinics.    Understanding: Patient and Caregiver reports having a clear understanding of the many lifetime commitments involved with being a transplant recipient, including costs, compliance, medications, lab work, procedures, appointments, concrete and financial planning, preparedness, timely and appropriate communication of concerns, abstinence (ETOH, tobacco, illicit non-prescribed drugs), adherence to all health care team recommendations, support system and caregiver involvement, appropriate and timely resource utilization and follow-through, mental health counseling as needed/recommended, and patient and caregiver responsibilities.  Social Service Handbook, resources and detailed educational information provided and reviewed.  Educational information provided.    Patient and Caregiver also reports current and expected compliance with health care regime and states having a clear understanding of the importance of compliance.      Patient and Caregiver reports a clear understanding that risks and benefits may be involved with organ transplantation and with organ donation.       Patient and Caregiver also reports clear understanding that psychosocial risk factors may affect patient, and include but are not limited to feelings of depression, generalized anxiety, anxiety regarding dependence on others, post traumatic stress disorder, feelings of guilt and other emotional and/or mental concerns, and/or exacerbation of existing mental health concerns.  Detailed resources provided and discussed.       Patient and Caregiver agrees to access appropriate resources in a timely manner as needed and/or as recommended, and to communicate concerns appropriately.  Patient and Caregiver also reports a clear understanding of treatment options available.     Patient and Caregiver received education in a group setting.   reviewed education, provided additional information, and answered questions.    Feelings or Concerns: Pt reports high motivation to pursue kidney organ transplant at this time.    Coping: Identify Patient & Caregiver Strategies to [place]:   1. In the past, coping with major surgery and/or related stress - supportive family   2. Currently & Pre-transplant - supportive family   3. At the time of surgery - supportive family   4. During post-Transplant & Recovery Period - supportive family    Goals: Pt reports hope for successful kidney organ transplant surgery so that he is not placed on dialysis. Patient referred to Vocational Rehabilitation.    Interview Behavior: Patient and Caregiver presents as alert and oriented x 4, pleasant, good eye contact, well groomed, recall good, concentration/judgement good, average intelligence, calm, communicative, cooperative, and asking and answering questions appropriately.          Transplant Social Work - Candidacy  Assessment/Plan:     Psychosocial Suitability: Patient presents as low risk candidate for kidney transplant at this time. Pt reports having organ transplant caregiver/transportation plan, medical insurance plan and plan to afford transplant costs all in place.    Recommendations/Additional Comments: 7-18-25 nephrology compliance update shows suitable compliance. Pt was able to communicate well in English for SW initial psychosocial. Pt may benefit from creole french speaking  for medical appointments.     recommends that pt conduct fundraising to assist pt with pay for cost of medications, food, gas, and other transplant related  needs.  SW recommends that pt remain aware of potential mental health concerns and contact the team if any concerns arise.  SW recommends that pt remain abstinent from tobacco, ETOH, and drug use.  SW supports pt's continued adherence. SW remains available to answer any questions or concerns that arise as the pt moves through the transplant process.      Final determination of transplant candidacy will be reviewed by the selection committee.      Claire BRISCOE LCSW

## 2025-07-28 ENCOUNTER — EPISODE CHANGES (OUTPATIENT)
Dept: TRANSPLANT | Facility: CLINIC | Age: 70
End: 2025-07-28

## 2025-07-29 ENCOUNTER — HOSPITAL ENCOUNTER (OUTPATIENT)
Facility: HOSPITAL | Age: 70
Discharge: HOME | End: 2025-07-30
Attending: EMERGENCY MEDICINE | Admitting: STUDENT IN AN ORGANIZED HEALTH CARE EDUCATION/TRAINING PROGRAM
Payer: MEDICARE

## 2025-07-29 DIAGNOSIS — R07.9 CHEST PAIN: ICD-10-CM

## 2025-07-29 DIAGNOSIS — R42 DIZZINESS: ICD-10-CM

## 2025-07-29 DIAGNOSIS — E87.5 HYPERKALEMIA: Primary | ICD-10-CM

## 2025-07-29 DIAGNOSIS — I10 HYPERTENSION, UNSPECIFIED TYPE: ICD-10-CM

## 2025-07-29 LAB
ABSOLUTE EOSINOPHIL (OHS): 0.14 K/UL
ABSOLUTE MONOCYTE (OHS): 0.61 K/UL (ref 0.3–1)
ABSOLUTE NEUTROPHIL COUNT (OHS): 4.32 K/UL (ref 1.8–7.7)
ALBUMIN SERPL BCP-MCNC: 3.7 G/DL (ref 3.5–5.2)
ALBUMIN SERPL BCP-MCNC: 4 G/DL (ref 3.5–5.2)
ALBUMIN SERPL BCP-MCNC: 4.3 G/DL (ref 3.5–5.2)
ALLENS TEST: ABNORMAL
ALP SERPL-CCNC: 108 UNIT/L (ref 40–150)
ALT SERPL W/O P-5'-P-CCNC: 11 UNIT/L (ref 10–44)
ANION GAP (OHS): 13 MMOL/L (ref 8–16)
ANION GAP (OHS): 14 MMOL/L (ref 8–16)
ANION GAP (OHS): 5 MMOL/L (ref 8–16)
ANION GAP (OHS): 9 MMOL/L (ref 8–16)
ANION GAP SERPL CALC-SCNC: 15 MMOL/L (ref 8–16)
AST SERPL-CCNC: 17 UNIT/L (ref 11–45)
BASOPHILS # BLD AUTO: 0.02 K/UL
BASOPHILS NFR BLD AUTO: 0.3 %
BILIRUB SERPL-MCNC: 0.5 MG/DL (ref 0.1–1)
BUN SERPL-MCNC: 41 MG/DL (ref 8–23)
BUN SERPL-MCNC: 41 MG/DL (ref 8–23)
BUN SERPL-MCNC: 42 MG/DL (ref 6–30)
BUN SERPL-MCNC: 42 MG/DL (ref 8–23)
BUN SERPL-MCNC: 43 MG/DL (ref 8–23)
CALCIUM SERPL-MCNC: 8.5 MG/DL (ref 8.7–10.5)
CALCIUM SERPL-MCNC: 9.4 MG/DL (ref 8.7–10.5)
CALCIUM SERPL-MCNC: 9.7 MG/DL (ref 8.7–10.5)
CALCIUM SERPL-MCNC: 9.8 MG/DL (ref 8.7–10.5)
CHLORIDE SERPL-SCNC: 104 MMOL/L (ref 95–110)
CHLORIDE SERPL-SCNC: 104 MMOL/L (ref 95–110)
CHLORIDE SERPL-SCNC: 108 MMOL/L (ref 95–110)
CHLORIDE SERPL-SCNC: 108 MMOL/L (ref 95–110)
CHLORIDE SERPL-SCNC: 110 MMOL/L (ref 95–110)
CO2 SERPL-SCNC: 19 MMOL/L (ref 23–29)
CO2 SERPL-SCNC: 20 MMOL/L (ref 23–29)
CO2 SERPL-SCNC: 21 MMOL/L (ref 23–29)
CO2 SERPL-SCNC: 22 MMOL/L (ref 23–29)
CREAT SERPL-MCNC: 4.2 MG/DL (ref 0.5–1.4)
CREAT SERPL-MCNC: 4.2 MG/DL (ref 0.5–1.4)
CREAT SERPL-MCNC: 4.3 MG/DL (ref 0.5–1.4)
CREAT SERPL-MCNC: 4.4 MG/DL (ref 0.5–1.4)
CREAT SERPL-MCNC: 4.6 MG/DL (ref 0.5–1.4)
ERYTHROCYTE [DISTWIDTH] IN BLOOD BY AUTOMATED COUNT: 11.5 % (ref 11.5–14.5)
GFR SERPLBLD CREATININE-BSD FMLA CKD-EPI: 14 ML/MIN/1.73/M2
GLUCOSE SERPL-MCNC: 282 MG/DL (ref 70–110)
GLUCOSE SERPL-MCNC: 293 MG/DL (ref 70–110)
GLUCOSE SERPL-MCNC: 306 MG/DL (ref 70–110)
GLUCOSE SERPL-MCNC: 307 MG/DL (ref 70–110)
GLUCOSE SERPL-MCNC: 379 MG/DL (ref 70–110)
HCT VFR BLD AUTO: 23.4 % (ref 40–54)
HCT VFR BLD CALC: 23 %PCV (ref 36–54)
HGB BLD-MCNC: 7.7 GM/DL (ref 14–18)
HOLD SPECIMEN: NORMAL
IMM GRANULOCYTES # BLD AUTO: 0.04 K/UL (ref 0–0.04)
IMM GRANULOCYTES NFR BLD AUTO: 0.6 % (ref 0–0.5)
LYMPHOCYTES # BLD AUTO: 1.96 K/UL (ref 1–4.8)
MAGNESIUM SERPL-MCNC: 2.4 MG/DL (ref 1.6–2.6)
MCH RBC QN AUTO: 30.6 PG (ref 27–31)
MCHC RBC AUTO-ENTMCNC: 32.9 G/DL (ref 32–36)
MCV RBC AUTO: 93 FL (ref 82–98)
NUCLEATED RBC (/100WBC) (OHS): 0 /100 WBC
PHOSPHATE SERPL-MCNC: 3.6 MG/DL (ref 2.7–4.5)
PHOSPHATE SERPL-MCNC: 4.6 MG/DL (ref 2.7–4.5)
PLATELET # BLD AUTO: 134 K/UL (ref 150–450)
PMV BLD AUTO: 13.3 FL (ref 9.2–12.9)
POC IONIZED CALCIUM: 1.21 MMOL/L (ref 1.06–1.42)
POC TCO2 (MEASURED): 22 MMOL/L (ref 23–29)
POCT GLUCOSE: 302 MG/DL (ref 70–110)
POCT GLUCOSE: 337 MG/DL (ref 70–110)
POTASSIUM BLD-SCNC: 7.6 MMOL/L (ref 3.5–5.1)
POTASSIUM SERPL-SCNC: 5 MMOL/L (ref 3.5–5.1)
POTASSIUM SERPL-SCNC: 5.1 MMOL/L (ref 3.5–5.1)
POTASSIUM SERPL-SCNC: 5.4 MMOL/L (ref 3.5–5.1)
POTASSIUM SERPL-SCNC: 5.4 MMOL/L (ref 3.5–5.1)
POTASSIUM SERPL-SCNC: 7.6 MMOL/L (ref 3.5–5.1)
PROT SERPL-MCNC: 6.5 GM/DL (ref 6–8.4)
RBC # BLD AUTO: 2.52 M/UL (ref 4.6–6.2)
RELATIVE EOSINOPHIL (OHS): 2 %
RELATIVE LYMPHOCYTE (OHS): 27.6 % (ref 18–48)
RELATIVE MONOCYTE (OHS): 8.6 % (ref 4–15)
RELATIVE NEUTROPHIL (OHS): 60.9 % (ref 38–73)
SAMPLE: ABNORMAL
SITE: ABNORMAL
SODIUM BLD-SCNC: 136 MMOL/L (ref 136–145)
SODIUM SERPL-SCNC: 135 MMOL/L (ref 136–145)
SODIUM SERPL-SCNC: 136 MMOL/L (ref 136–145)
SODIUM SERPL-SCNC: 139 MMOL/L (ref 136–145)
SODIUM SERPL-SCNC: 139 MMOL/L (ref 136–145)
WBC # BLD AUTO: 7.09 K/UL (ref 3.9–12.7)

## 2025-07-29 PROCEDURE — 25000003 PHARM REV CODE 250: Mod: NTX | Performed by: EMERGENCY MEDICINE

## 2025-07-29 PROCEDURE — 84132 ASSAY OF SERUM POTASSIUM: CPT | Mod: NTX

## 2025-07-29 PROCEDURE — 36415 COLL VENOUS BLD VENIPUNCTURE: CPT | Mod: NTX | Performed by: EMERGENCY MEDICINE

## 2025-07-29 PROCEDURE — 84132 ASSAY OF SERUM POTASSIUM: CPT | Mod: NTX,59 | Performed by: INTERNAL MEDICINE

## 2025-07-29 PROCEDURE — 82330 ASSAY OF CALCIUM: CPT | Mod: NTX

## 2025-07-29 PROCEDURE — 94760 N-INVAS EAR/PLS OXIMETRY 1: CPT | Mod: NTX

## 2025-07-29 PROCEDURE — 82565 ASSAY OF CREATININE: CPT | Mod: NTX

## 2025-07-29 PROCEDURE — 94799 UNLISTED PULMONARY SVC/PX: CPT | Mod: NTX

## 2025-07-29 PROCEDURE — 93010 ELECTROCARDIOGRAM REPORT: CPT | Mod: NTX,,, | Performed by: INTERNAL MEDICINE

## 2025-07-29 PROCEDURE — 25000003 PHARM REV CODE 250: Mod: NTX | Performed by: NURSE PRACTITIONER

## 2025-07-29 PROCEDURE — G0378 HOSPITAL OBSERVATION PER HR: HCPCS | Mod: NTX

## 2025-07-29 PROCEDURE — 80053 COMPREHEN METABOLIC PANEL: CPT | Mod: NTX | Performed by: EMERGENCY MEDICINE

## 2025-07-29 PROCEDURE — 82310 ASSAY OF CALCIUM: CPT | Mod: NTX | Performed by: STUDENT IN AN ORGANIZED HEALTH CARE EDUCATION/TRAINING PROGRAM

## 2025-07-29 PROCEDURE — 84132 ASSAY OF SERUM POTASSIUM: CPT | Mod: 59,NTX | Performed by: STUDENT IN AN ORGANIZED HEALTH CARE EDUCATION/TRAINING PROGRAM

## 2025-07-29 PROCEDURE — 84295 ASSAY OF SERUM SODIUM: CPT | Mod: NTX

## 2025-07-29 PROCEDURE — 83735 ASSAY OF MAGNESIUM: CPT | Mod: NTX | Performed by: EMERGENCY MEDICINE

## 2025-07-29 PROCEDURE — 94761 N-INVAS EAR/PLS OXIMETRY MLT: CPT | Mod: NTX

## 2025-07-29 PROCEDURE — 93005 ELECTROCARDIOGRAM TRACING: CPT | Mod: NTX

## 2025-07-29 PROCEDURE — 84132 ASSAY OF SERUM POTASSIUM: CPT | Mod: NTX | Performed by: EMERGENCY MEDICINE

## 2025-07-29 PROCEDURE — 63600175 PHARM REV CODE 636 W HCPCS: Mod: NTX | Performed by: NURSE PRACTITIONER

## 2025-07-29 PROCEDURE — 25000003 PHARM REV CODE 250: Mod: NTX | Performed by: INTERNAL MEDICINE

## 2025-07-29 PROCEDURE — 99900035 HC TECH TIME PER 15 MIN (STAT): Mod: NTX

## 2025-07-29 PROCEDURE — 99291 CRITICAL CARE FIRST HOUR: CPT | Mod: NTX,,, | Performed by: INTERNAL MEDICINE

## 2025-07-29 PROCEDURE — 36415 COLL VENOUS BLD VENIPUNCTURE: CPT | Mod: NTX | Performed by: STUDENT IN AN ORGANIZED HEALTH CARE EDUCATION/TRAINING PROGRAM

## 2025-07-29 PROCEDURE — 63600175 PHARM REV CODE 636 W HCPCS: Mod: NTX | Performed by: EMERGENCY MEDICINE

## 2025-07-29 PROCEDURE — 85014 HEMATOCRIT: CPT | Mod: NTX

## 2025-07-29 PROCEDURE — 96372 THER/PROPH/DIAG INJ SC/IM: CPT | Performed by: NURSE PRACTITIONER

## 2025-07-29 PROCEDURE — 94640 AIRWAY INHALATION TREATMENT: CPT | Mod: NTX

## 2025-07-29 PROCEDURE — 85025 COMPLETE CBC W/AUTO DIFF WBC: CPT | Mod: NTX | Performed by: EMERGENCY MEDICINE

## 2025-07-29 PROCEDURE — 83036 HEMOGLOBIN GLYCOSYLATED A1C: CPT | Mod: NTX | Performed by: NURSE PRACTITIONER

## 2025-07-29 PROCEDURE — 25000242 PHARM REV CODE 250 ALT 637 W/ HCPCS: Mod: NTX | Performed by: EMERGENCY MEDICINE

## 2025-07-29 RX ORDER — CARVEDILOL 6.25 MG/1
6.25 TABLET ORAL 2 TIMES DAILY
Status: DISCONTINUED | OUTPATIENT
Start: 2025-07-29 | End: 2025-07-30 | Stop reason: HOSPADM

## 2025-07-29 RX ORDER — SODIUM CHLORIDE 0.9 % (FLUSH) 0.9 %
10 SYRINGE (ML) INJECTION EVERY 8 HOURS PRN
Status: DISCONTINUED | OUTPATIENT
Start: 2025-07-29 | End: 2025-07-30 | Stop reason: HOSPADM

## 2025-07-29 RX ORDER — ACETAMINOPHEN 325 MG/1
650 TABLET ORAL EVERY 4 HOURS PRN
Status: DISCONTINUED | OUTPATIENT
Start: 2025-07-29 | End: 2025-07-30 | Stop reason: HOSPADM

## 2025-07-29 RX ORDER — IBUPROFEN 200 MG
16 TABLET ORAL
Status: DISCONTINUED | OUTPATIENT
Start: 2025-07-29 | End: 2025-07-30 | Stop reason: HOSPADM

## 2025-07-29 RX ORDER — PRAVASTATIN SODIUM 40 MG/1
40 TABLET ORAL DAILY
Status: DISCONTINUED | OUTPATIENT
Start: 2025-07-30 | End: 2025-07-30 | Stop reason: HOSPADM

## 2025-07-29 RX ORDER — CALCIUM GLUCONATE 20 MG/ML
1 INJECTION, SOLUTION INTRAVENOUS
Status: COMPLETED | OUTPATIENT
Start: 2025-07-29 | End: 2025-07-29

## 2025-07-29 RX ORDER — DOXAZOSIN 4 MG/1
8 TABLET ORAL NIGHTLY
Status: DISCONTINUED | OUTPATIENT
Start: 2025-07-29 | End: 2025-07-30 | Stop reason: HOSPADM

## 2025-07-29 RX ORDER — NAPROXEN SODIUM 220 MG/1
81 TABLET, FILM COATED ORAL DAILY
Status: DISCONTINUED | OUTPATIENT
Start: 2025-07-30 | End: 2025-07-30 | Stop reason: HOSPADM

## 2025-07-29 RX ORDER — GLUCAGON 1 MG
1 KIT INJECTION
Status: DISCONTINUED | OUTPATIENT
Start: 2025-07-29 | End: 2025-07-30 | Stop reason: HOSPADM

## 2025-07-29 RX ORDER — ONDANSETRON HYDROCHLORIDE 2 MG/ML
4 INJECTION, SOLUTION INTRAVENOUS EVERY 8 HOURS PRN
Status: DISCONTINUED | OUTPATIENT
Start: 2025-07-29 | End: 2025-07-30 | Stop reason: HOSPADM

## 2025-07-29 RX ORDER — HEPARIN SODIUM 5000 [USP'U]/ML
5000 INJECTION, SOLUTION INTRAVENOUS; SUBCUTANEOUS EVERY 8 HOURS
Status: DISCONTINUED | OUTPATIENT
Start: 2025-07-29 | End: 2025-07-30 | Stop reason: HOSPADM

## 2025-07-29 RX ORDER — FUROSEMIDE 10 MG/ML
80 INJECTION INTRAMUSCULAR; INTRAVENOUS
Status: COMPLETED | OUTPATIENT
Start: 2025-07-29 | End: 2025-07-29

## 2025-07-29 RX ORDER — NALOXONE HCL 0.4 MG/ML
0.02 VIAL (ML) INJECTION
Status: DISCONTINUED | OUTPATIENT
Start: 2025-07-29 | End: 2025-07-30 | Stop reason: HOSPADM

## 2025-07-29 RX ORDER — CALCIUM GLUCONATE 20 MG/ML
1 INJECTION, SOLUTION INTRAVENOUS EVERY 10 MIN PRN
Status: DISCONTINUED | OUTPATIENT
Start: 2025-07-29 | End: 2025-07-29

## 2025-07-29 RX ORDER — DOXAZOSIN 4 MG/1
8 TABLET ORAL NIGHTLY
Status: DISCONTINUED | OUTPATIENT
Start: 2025-07-29 | End: 2025-07-29

## 2025-07-29 RX ORDER — IPRATROPIUM BROMIDE AND ALBUTEROL SULFATE 2.5; .5 MG/3ML; MG/3ML
3 SOLUTION RESPIRATORY (INHALATION) EVERY 4 HOURS PRN
Status: DISCONTINUED | OUTPATIENT
Start: 2025-07-29 | End: 2025-07-30 | Stop reason: HOSPADM

## 2025-07-29 RX ORDER — INSULIN ASPART 100 [IU]/ML
0-5 INJECTION, SOLUTION INTRAVENOUS; SUBCUTANEOUS
Status: DISCONTINUED | OUTPATIENT
Start: 2025-07-29 | End: 2025-07-30 | Stop reason: HOSPADM

## 2025-07-29 RX ORDER — CLONIDINE HYDROCHLORIDE 0.1 MG/1
0.1 TABLET ORAL 2 TIMES DAILY
Status: DISCONTINUED | OUTPATIENT
Start: 2025-07-29 | End: 2025-07-30 | Stop reason: HOSPADM

## 2025-07-29 RX ORDER — ALBUTEROL SULFATE 2.5 MG/.5ML
10 SOLUTION RESPIRATORY (INHALATION)
Status: COMPLETED | OUTPATIENT
Start: 2025-07-29 | End: 2025-07-29

## 2025-07-29 RX ORDER — SODIUM BICARBONATE 325 MG/1
650 TABLET ORAL 3 TIMES DAILY
Status: DISCONTINUED | OUTPATIENT
Start: 2025-07-29 | End: 2025-07-30

## 2025-07-29 RX ORDER — DILTIAZEM HYDROCHLORIDE 120 MG/1
240 CAPSULE, COATED, EXTENDED RELEASE ORAL DAILY
Status: DISCONTINUED | OUTPATIENT
Start: 2025-07-30 | End: 2025-07-30 | Stop reason: HOSPADM

## 2025-07-29 RX ORDER — IBUPROFEN 200 MG
24 TABLET ORAL
Status: DISCONTINUED | OUTPATIENT
Start: 2025-07-29 | End: 2025-07-30 | Stop reason: HOSPADM

## 2025-07-29 RX ORDER — TALC
6 POWDER (GRAM) TOPICAL NIGHTLY PRN
Status: DISCONTINUED | OUTPATIENT
Start: 2025-07-29 | End: 2025-07-30 | Stop reason: HOSPADM

## 2025-07-29 RX ORDER — CLONIDINE HYDROCHLORIDE 0.1 MG/1
0.1 TABLET ORAL 2 TIMES DAILY
Status: DISCONTINUED | OUTPATIENT
Start: 2025-07-29 | End: 2025-07-29

## 2025-07-29 RX ADMIN — FUROSEMIDE 80 MG: 10 INJECTION, SOLUTION INTRAVENOUS at 03:07

## 2025-07-29 RX ADMIN — LACTULOSE 30 G: 20 SOLUTION ORAL at 06:07

## 2025-07-29 RX ADMIN — CARVEDILOL 6.25 MG: 6.25 TABLET, FILM COATED ORAL at 08:07

## 2025-07-29 RX ADMIN — SODIUM BICARBONATE: 84 INJECTION, SOLUTION INTRAVENOUS at 06:07

## 2025-07-29 RX ADMIN — SODIUM CHLORIDE, POTASSIUM CHLORIDE, SODIUM LACTATE AND CALCIUM CHLORIDE 2000 ML: 600; 310; 30; 20 INJECTION, SOLUTION INTRAVENOUS at 04:07

## 2025-07-29 RX ADMIN — CALCIUM GLUCONATE 1 G: 20 INJECTION, SOLUTION INTRAVENOUS at 03:07

## 2025-07-29 RX ADMIN — HEPARIN SODIUM 5000 UNITS: 5000 INJECTION INTRAVENOUS; SUBCUTANEOUS at 08:07

## 2025-07-29 RX ADMIN — DOXAZOSIN 8 MG: 4 TABLET ORAL at 08:07

## 2025-07-29 RX ADMIN — CLONIDINE HYDROCHLORIDE 0.1 MG: 0.1 TABLET ORAL at 07:07

## 2025-07-29 RX ADMIN — DEXTROSE MONOHYDRATE 50 G: 25 INJECTION, SOLUTION INTRAVENOUS at 03:07

## 2025-07-29 RX ADMIN — SODIUM ZIRCONIUM CYCLOSILICATE 10 G: 10 POWDER, FOR SUSPENSION ORAL at 08:07

## 2025-07-29 RX ADMIN — INSULIN ASPART 2 UNITS: 100 INJECTION, SOLUTION INTRAVENOUS; SUBCUTANEOUS at 09:07

## 2025-07-29 RX ADMIN — ALBUTEROL SULFATE 10 MG: 2.5 SOLUTION RESPIRATORY (INHALATION) at 03:07

## 2025-07-29 RX ADMIN — SODIUM ZIRCONIUM CYCLOSILICATE 10 G: 10 POWDER, FOR SUSPENSION ORAL at 03:07

## 2025-07-29 RX ADMIN — INSULIN HUMAN 5 UNITS: 100 INJECTION, SOLUTION PARENTERAL at 03:07

## 2025-07-29 RX ADMIN — SODIUM BICARBONATE 650 MG: 325 TABLET ORAL at 09:07

## 2025-07-29 NOTE — ED PROVIDER NOTES
Encounter Date: 7/29/2025       History     Chief Complaint   Patient presents with    Dizziness     Dizziness starting at 0700 this morning with position changes. Denies any unilateral weakness or blurred vision. Pt Jaundice. No slurred speech noted. Dr. Colon assessing pt in triage. Steady gait noted per EMS       70-year-old male history of CKD, hypertension, diabetes mellitus.  The patient is currently on a kidney transplant list.    The patient is presenting for difficulty walking.  The patient reports he woke up at 5:00 a.m. feeling normal.  Around 7:00 a.m. he began having unsteadiness with walking.  He reports symptoms has been constant since onset.  Denies any pains, headache, vision changes, weakness or numbness in the extremities.          Review of patient's allergies indicates:  No Known Allergies  Past Medical History:   Diagnosis Date    Anemia     Cataract     COVID-19     Diabetes mellitus, type 2     Disorder of kidney and ureter     History of transient ischemic attack (TIA)     Hyperlipidemia     Hypertension 2004    Obstructive uropathy     Personal history of transient ischemic attack     Prostate cancer     Sickle cell trait     Syphilis, unspecified     Transient cerebral ischemic attack     Vitamin D deficiency      Past Surgical History:   Procedure Laterality Date    CATARACT EXTRACTION W/  INTRAOCULAR LENS IMPLANT Right 07/24/2020    CATARACT EXTRACTION W/  INTRAOCULAR LENS IMPLANT Left 08/04/2020    CYSTOSCOPY W/ URETERAL STENT PLACEMENT N/A 06/13/2021    Procedure: CYSTOSCOPY, WITH URETERAL STENT INSERTION;  Surgeon: APOORVA Salazar MD;  Location: [facility];  Service: Urology;  Laterality: N/A;    EYE SURGERY      PROSTATECTOMY  05/19/2017    PROSTATECTOMY, RADICAL, LAPAROSCOPIC, RETROPUBIC APPROACH       Family History   Problem Relation Name Age of Onset    Diabetes Mother      Hyperlipidemia Mother      Diabetes Father      Hyperlipidemia Father      Asthma Father       Social  History[1]  Review of Systems   Constitutional:  Negative for fever.   HENT:  Negative for congestion.    Respiratory:  Negative for cough and shortness of breath.    Cardiovascular:  Negative for chest pain.   Gastrointestinal:  Negative for abdominal pain, nausea and vomiting.   Genitourinary:  Negative for difficulty urinating, dysuria, flank pain and frequency.   Musculoskeletal:  Negative for back pain.   Skin:  Negative for rash.   Neurological:  Positive for dizziness and light-headedness. Negative for facial asymmetry, speech difficulty, weakness, numbness and headaches.       Physical Exam     Initial Vitals [07/29/25 1456]   BP Pulse Resp Temp SpO2   (!) 161/84 65 18 98.4 °F (36.9 °C) 100 %      MAP       --         Physical Exam    Nursing note and vitals reviewed.  Constitutional: He appears well-developed. He is not diaphoretic. No distress.   HENT:   Head: Normocephalic.   Eyes: EOM are normal. Right eye exhibits normal extraocular motion and no nystagmus. Left eye exhibits normal extraocular motion and no nystagmus.   No scleral icterus.   Cardiovascular:  Normal rate and regular rhythm.           No murmur heard.  Pulmonary/Chest: Effort normal and breath sounds normal. He has no wheezes.   Abdominal: Abdomen is soft. He exhibits no distension. There is no abdominal tenderness.   Genitourinary:    Genitourinary Comments: Jessica Stubbs RN    Melena on exam.  Hemoccult negative.     Musculoskeletal:         General: Normal range of motion.      Comments: No lower extremity edema bilaterally.  Calves symmetrical.     Neurological: He is alert. He has normal strength. No cranial nerve deficit or sensory deficit.   No dysarthria.  Finger-nose-finger intact bilaterally.  No drift in all extremities.   Skin: Skin is warm.   Palms appeared jaundiced.         ED Course   Critical Care    Date/Time: 7/29/2025 1:30 PM    Performed by: Clark Colon MD  Authorized by: Clark Colon MD  Direct  patient critical care time: 15 minutes  Ordering / reviewing critical care time: 10 minutes  Documentation critical care time: 10 minutes  Total critical care time (exclusive of procedural time) : 35 minutes  Critical care time was exclusive of separately billable procedures and treating other patients and teaching time.  Critical care was necessary to treat or prevent imminent or life-threatening deterioration of the following conditions: metabolic crisis.  Critical care was time spent personally by me on the following activities: blood draw for specimens, development of treatment plan with patient or surrogate, obtaining history from patient or surrogate, ordering and performing treatments and interventions, ordering and review of laboratory studies, ordering and review of radiographic studies, pulse oximetry, re-evaluation of patient's condition, examination of patient and evaluation of patient's response to treatment.  Comments: Hyperkalemia requiring emergent treatment in setting of CKD.        Labs Reviewed   COMPREHENSIVE METABOLIC PANEL - Abnormal       Result Value    Sodium 135 (*)     Potassium 7.6 (*)     Chloride 110      CO2 20 (*)     Glucose 293 (*)     BUN 42 (*)     Creatinine 4.4 (*)     Calcium 8.5 (*)     Protein Total 6.5      Albumin 3.7      Bilirubin Total 0.5            AST 17      ALT 11      Anion Gap 5 (*)     eGFR 14 (*)    CBC WITH DIFFERENTIAL - Abnormal    WBC 7.09      RBC 2.52 (*)     HGB 7.7 (*)     HCT 23.4 (*)     MCV 93      MCH 30.6      MCHC 32.9      RDW 11.5      Platelet Count 134 (*)     MPV 13.3 (*)     Nucleated RBC 0      Neut % 60.9      Lymph % 27.6      Mono % 8.6      Eos % 2.0      Basophil % 0.3      Imm Grans % 0.6 (*)     Neut # 4.32      Lymph # 1.96      Mono # 0.61      Eos # 0.14      Baso # 0.02      Imm Grans # 0.04     POTASSIUM - Abnormal    Potassium 5.4 (*)    RENAL FUNCTION PANEL - Abnormal    Sodium 136      Potassium 5.4 (*)     Chloride 108       CO2 19 (*)     Glucose 379 (*)     BUN 43 (*)     Creatinine 4.3 (*)     Calcium 9.4      Albumin 4.0      Phosphorus Level 3.6      Anion Gap 9      eGFR 14 (*)    HEMOGLOBIN A1C - Abnormal    Hemoglobin A1c 10.0 (*)     Estimated Average Glucose 240 (*)    RENAL FUNCTION PANEL - Abnormal    Sodium 139      Potassium 5.1      Chloride 104      CO2 22 (*)     Glucose 307 (*)     BUN 41 (*)     Creatinine 4.2 (*)     Calcium 9.8      Albumin 4.3      Phosphorus Level 4.6 (*)     Anion Gap 13      eGFR 14 (*)    BASIC METABOLIC PANEL - Abnormal    Sodium 139      Potassium 5.0      Chloride 104      CO2 21 (*)     Glucose 306 (*)     BUN 41 (*)     Creatinine 4.2 (*)     Calcium 9.7      Anion Gap 14      eGFR 14 (*)    BASIC METABOLIC PANEL - Abnormal    Sodium 140      Potassium 4.3      Chloride 105      CO2 22 (*)     Glucose 239 (*)     BUN 40 (*)     Creatinine 4.1 (*)     Calcium 8.9      Anion Gap 13      eGFR 15 (*)    RENAL FUNCTION PANEL - Abnormal    Sodium 139      Potassium 4.3      Chloride 105      CO2 23      Glucose 241 (*)     BUN 38 (*)     Creatinine 4.1 (*)     Calcium 9.1      Albumin 3.7      Phosphorus Level 5.2 (*)     Anion Gap 11      eGFR 15 (*)    BASIC METABOLIC PANEL - Abnormal    Sodium 141      Potassium 4.0      Chloride 104      CO2 25      Glucose 271 (*)     BUN 37 (*)     Creatinine 4.0 (*)     Calcium 8.5 (*)     Anion Gap 12      eGFR 15 (*)    RENAL FUNCTION PANEL - Abnormal    Sodium 141      Potassium 4.0      Chloride 104      CO2 25      Glucose 272 (*)     BUN 37 (*)     Creatinine 4.1 (*)     Calcium 8.5 (*)     Albumin 3.4 (*)     Phosphorus Level 5.4 (*)     Anion Gap 12      eGFR 15 (*)    CBC WITH DIFFERENTIAL - Abnormal    WBC 6.36      RBC 2.49 (*)     HGB 7.6 (*)     HCT 23.0 (*)     MCV 92      MCH 30.5      MCHC 33.0      RDW 11.5      Platelet Count 154      MPV 13.3 (*)     Nucleated RBC 0      Neut % 60.9      Lymph % 28.6      Mono % 7.7      Eos % 2.0       Basophil % 0.2      Imm Grans % 0.6 (*)     Neut # 3.87      Lymph # 1.82      Mono # 0.49      Eos # 0.13      Baso # 0.01      Imm Grans # 0.04     RENAL FUNCTION PANEL - Abnormal    Sodium 139      Potassium 4.3      Chloride 108      CO2 21 (*)     Glucose 241 (*)     BUN 36 (*)     Creatinine 3.9 (*)     Calcium 8.8      Albumin 3.7      Phosphorus Level 4.9 (*)     Anion Gap 10      eGFR 16 (*)     Narrative:     Specimen slightly hemolyzed.   BASIC METABOLIC PANEL - Abnormal    Sodium 138      Potassium 4.3      Chloride 104      CO2 24      Glucose 264 (*)     BUN 37 (*)     Creatinine 4.0 (*)     Calcium 9.0      Anion Gap 10      eGFR 15 (*)    RENAL FUNCTION PANEL - Abnormal    Sodium 138      Potassium 4.3      Chloride 104      CO2 24      Glucose 264 (*)     BUN 37 (*)     Creatinine 4.0 (*)     Calcium 9.0      Albumin 3.7      Phosphorus Level 5.0 (*)     Anion Gap 10      eGFR 15 (*)    POCT GLUCOSE - Abnormal    POCT Glucose 337 (*)    ISTAT PROCEDURE - Abnormal    POC Glucose 282 (*)     POC BUN 42 (*)     POC Creatinine 4.6 (*)     POC Sodium 136      POC Potassium 7.6 (*)     POC Chloride 108      POC TCO2 (MEASURED) 22 (*)     POC Anion Gap 15      POC Ionized Calcium 1.21      POC Hematocrit 23 (*)     Sample VENOUS      Site Other      Allens Test N/A     POCT GLUCOSE - Abnormal    POCT Glucose 302 (*)    POCT GLUCOSE - Abnormal    POCT Glucose 275 (*)    MAGNESIUM - Normal    Magnesium  2.4     CBC W/ AUTO DIFFERENTIAL    Narrative:     The following orders were created for panel order CBC W/ AUTO DIFFERENTIAL.  Procedure                               Abnormality         Status                     ---------                               -----------         ------                     CBC with Differential[[id number]]       Abnormal            Final result                 Please view results for these tests on the individual orders.   EXTRA TUBES    Narrative:     The following orders were  created for panel order EXTRA TUBES.  Procedure                               Abnormality         Status                     ---------                               -----------         ------                     Lavender Top Hold[[id number]]                               Final result                 Please view results for these tests on the individual orders.   LAVENDER TOP HOLD    Extra Tube Hold for add-ons.     CBC W/ AUTO DIFFERENTIAL    Narrative:     The following orders were created for panel order CBC auto differential.  Procedure                               Abnormality         Status                     ---------                               -----------         ------                     CBC with Differential[[id number]]       Abnormal            Final result                 Please view results for these tests on the individual orders.        ECG Results              EKG 12-lead (Final result)        Collection Time Result Time QRS Duration OHS QTC Calculation    07/29/25 15:26:33 07/30/25 22:37:41 98 400                     Final result by Interface, Lab In [facility] (07/30/25 22:37:49)                   Narrative:    Test Reason : R27.0,    Vent. Rate :  72 BPM     Atrial Rate :  72 BPM     P-R Int : 160 ms          QRS Dur :  98 ms      QT Int : 366 ms       P-R-T Axes :  74  62  50 degrees    QTcB Int : 400 ms    Normal sinus rhythm  Voltage criteria for left ventricular hypertrophy  Abnormal ECG  When compared with ECG of 14-Oct-2021 18:29,  Significant changes have occurred  Confirmed by Chris Mckeon ([id number]) on 7/30/2025 10:37:36 PM    Referred By: AAAREFERRAL SELF           Confirmed By: Chris Mckeon                                     EKG 12-lead (Final result)  Result time 07/30/25 11:43:57      Final result by Unknown User (07/30/25 11:43:57)                                      Imaging Results              CT Head Without Contrast (Final result)  Result time 07/29/25 17:00:16      Final result by  Valeri Lester MD (07/29/25 17:00:16)                   Impression:      No acute abnormality.  No detrimental change from prior.      Electronically signed by: Valeri Lester  Date:    07/29/2025  Time:    17:00               Narrative:    EXAMINATION:  CT HEAD WITHOUT CONTRAST    CLINICAL HISTORY:  Ataxia;    TECHNIQUE:  Low dose axial CT images obtained throughout the head without intravenous contrast. Sagittal and coronal reconstructions were performed.    COMPARISON:  05/02/2021    FINDINGS:  Intracranial compartment:    Ventricles are normal in size for age and midline. Sulci and basilar cisterns are preserved.   No acute extra-axial fluid collections.    No parenchymal mass, hemorrhage, edema or major vascular distribution infarct.    Skull/extracranial contents (limited evaluation): No depressed skull fracture. Mastoid air cells are clear.Paranasal sinuses are essentially clear.                                       Medications   calcium gluconate 1 g in NS IVPB (premixed) (0 g Intravenous Stopped 7/29/25 1600)   furosemide injection 80 mg (80 mg Intravenous Given 7/29/25 1547)   sodium zirconium cyclosilicate packet 10 g (10 g Oral Given 7/29/25 1551)   dextrose 50% injection 50 g (50 g Intravenous Given 7/29/25 1554)     And   insulin regular injection 5 Units 0.05 mL (5 Units Intravenous Given 7/29/25 1556)   albuterol sulfate nebulizer solution 10 mg (10 mg Nebulization Given 7/29/25 1543)   lactated ringers bolus 2,000 mL (0 mLs Intravenous Stopped 7/29/25 2200)   sodium zirconium cyclosilicate packet 10 g (10 g Oral Given 7/30/25 0833)     Medical Decision Making  70-year-old male presenting for lightheadedness and dizziness.  The patient had no focal neurological deficit.  Known history of CKD.  Initial potassium 7.6.  The patient was then provided temporizing measures for hypokalemia.    Consultation with Nephrology, Dr. Queen.  Agrees with plan and treatment.    Suspected symptoms of  dizziness secondary to hyperkalemia.  The hyperkalemia did improve with repeat a 5.5.  Case discussed with hospital Medicine.  Agrees with plan for hospitalization.    Melena on exam.  Hemoccult negative.  The patient reports taking iron pills.      Medical Decision Making:     A. Problem List:  1. Hypokalemia   2.  Dizziness     B. Differential diagnosis:    CVA, volume depletion, electrolyte abnormalities, arrhythmia     ECG:  Please check workup area for ECG interpretation.    Part of the note was done using electronic dictation services.         Amount and/or Complexity of Data Reviewed  Labs: ordered. Decision-making details documented in ED Course.  Radiology: ordered.  ECG/medicine tests:  Decision-making details documented in ED Course.    Risk  OTC drugs.  Prescription drug management.               ED Course as of 07/31/25 1333 Tue Jul 29, 2025   1509 The patient speaks English.  The patient prefers to speak English. [JM]   1536 POC Potassium(!!): 7.6 [JM]   1642 Potassium(!!): 7.6 [JM]   1643 CO2(!): 20 [JM]   1741 EKG 12-lead  Time 3:26 p.m.     Rate 72, sinus, regular rhythm, normal axis.   QRS 98 .  No ST elevation or depression no T-wave inversion no hyperacute T-waves.    Normal sinus rhythm [JM]      ED Course User Index  [JM] Clakr Colon MD                               Clinical Impression:  Final diagnoses:  [E87.5] Hyperkalemia (Primary)  [R42] Dizziness          ED Disposition Condition    Observation                       [1]   Social History  Tobacco Use    Smoking status: Never    Smokeless tobacco: Never   Vaping Use    Vaping status: Never Used   Substance Use Topics    Alcohol use: No    Drug use: No        Clark Colon MD  07/31/25 [id number]

## 2025-07-29 NOTE — PROGRESS NOTES
Consulted for hyperkalemia. K 7.6. Cr 4.4 (from 3.8). Followed outpatient by Dr. Lee.   VSS. EKG without T-wave changes or arrhythmia.   Patient on room air. Reports to be doing okay. Admits to eating avocados and coconuts.     S/p calcium, albuterol, insulin/D50, Lokelma 10g, LR x 2L and lasix 80mg IV in the ED.   Ordered bicarb gtt at 100cc/hr, carty catheter placement, lokelma 10g TID, and lactulose 10g TID.   Labs q4h. If potassium worsens, EKG changes develop, or he becomes anuric, will initiate HD.   Recommend admission to ICU. Monitor on telemetry.       Full consult note to follow.      Lakisha Queen MD  [facility]  [phone]

## 2025-07-29 NOTE — ED NOTES
Attempted to place carty cath.  Unable to advance past prostate. Pt was visibly pain.  Asked us to remove the cath and did not want one.  MD notified.

## 2025-07-29 NOTE — PHARMACY MED REC
"7/29/2025 Patient is unable to confirm medications at bedside. External Rx history was used to confirm refill history.      Admission Medication History     The home medication history was taken by Analilia Aguilar.    You may go to "Admission" then "Reconcile Home Medications" tabs to review and/or act upon these items.     The home medication list has been updated by the Pharmacy department.   Please read ALL comments highlighted in yellow.   Please address this information as you see fit.    Feel free to contact us if you have any questions or require assistance.      Medications listed below were obtained from: [employer]- [email or web address]  (Not in a hospital admission)          Analilia Aguilar  [phone]                 .          "

## 2025-07-30 VITALS
TEMPERATURE: 98 F | BODY MASS INDEX: 25.59 KG/M2 | OXYGEN SATURATION: 99 % | SYSTOLIC BLOOD PRESSURE: 152 MMHG | HEART RATE: 72 BPM | RESPIRATION RATE: 16 BRPM | WEIGHT: 160 LBS | DIASTOLIC BLOOD PRESSURE: 88 MMHG

## 2025-07-30 LAB
ABSOLUTE EOSINOPHIL (OHS): 0.13 K/UL
ABSOLUTE MONOCYTE (OHS): 0.49 K/UL (ref 0.3–1)
ABSOLUTE NEUTROPHIL COUNT (OHS): 3.87 K/UL (ref 1.8–7.7)
ALBUMIN SERPL BCP-MCNC: 3.4 G/DL (ref 3.5–5.2)
ALBUMIN SERPL BCP-MCNC: 3.7 G/DL (ref 3.5–5.2)
ANION GAP (OHS): 10 MMOL/L (ref 8–16)
ANION GAP (OHS): 11 MMOL/L (ref 8–16)
ANION GAP (OHS): 12 MMOL/L (ref 8–16)
ANION GAP (OHS): 12 MMOL/L (ref 8–16)
ANION GAP (OHS): 13 MMOL/L (ref 8–16)
BASOPHILS # BLD AUTO: 0.01 K/UL
BASOPHILS NFR BLD AUTO: 0.2 %
BUN SERPL-MCNC: 36 MG/DL (ref 8–23)
BUN SERPL-MCNC: 37 MG/DL (ref 8–23)
BUN SERPL-MCNC: 38 MG/DL (ref 8–23)
BUN SERPL-MCNC: 40 MG/DL (ref 8–23)
CALCIUM SERPL-MCNC: 8.5 MG/DL (ref 8.7–10.5)
CALCIUM SERPL-MCNC: 8.5 MG/DL (ref 8.7–10.5)
CALCIUM SERPL-MCNC: 8.8 MG/DL (ref 8.7–10.5)
CALCIUM SERPL-MCNC: 8.9 MG/DL (ref 8.7–10.5)
CALCIUM SERPL-MCNC: 9 MG/DL (ref 8.7–10.5)
CALCIUM SERPL-MCNC: 9 MG/DL (ref 8.7–10.5)
CALCIUM SERPL-MCNC: 9.1 MG/DL (ref 8.7–10.5)
CHLORIDE SERPL-SCNC: 104 MMOL/L (ref 95–110)
CHLORIDE SERPL-SCNC: 105 MMOL/L (ref 95–110)
CHLORIDE SERPL-SCNC: 105 MMOL/L (ref 95–110)
CHLORIDE SERPL-SCNC: 108 MMOL/L (ref 95–110)
CO2 SERPL-SCNC: 21 MMOL/L (ref 23–29)
CO2 SERPL-SCNC: 22 MMOL/L (ref 23–29)
CO2 SERPL-SCNC: 23 MMOL/L (ref 23–29)
CO2 SERPL-SCNC: 24 MMOL/L (ref 23–29)
CO2 SERPL-SCNC: 24 MMOL/L (ref 23–29)
CO2 SERPL-SCNC: 25 MMOL/L (ref 23–29)
CO2 SERPL-SCNC: 25 MMOL/L (ref 23–29)
CREAT SERPL-MCNC: 3.9 MG/DL (ref 0.5–1.4)
CREAT SERPL-MCNC: 4 MG/DL (ref 0.5–1.4)
CREAT SERPL-MCNC: 4.1 MG/DL (ref 0.5–1.4)
EAG (OHS): 240 MG/DL (ref 68–131)
ERYTHROCYTE [DISTWIDTH] IN BLOOD BY AUTOMATED COUNT: 11.5 % (ref 11.5–14.5)
GFR SERPLBLD CREATININE-BSD FMLA CKD-EPI: 15 ML/MIN/1.73/M2
GFR SERPLBLD CREATININE-BSD FMLA CKD-EPI: 16 ML/MIN/1.73/M2
GLUCOSE SERPL-MCNC: 239 MG/DL (ref 70–110)
GLUCOSE SERPL-MCNC: 241 MG/DL (ref 70–110)
GLUCOSE SERPL-MCNC: 241 MG/DL (ref 70–110)
GLUCOSE SERPL-MCNC: 264 MG/DL (ref 70–110)
GLUCOSE SERPL-MCNC: 264 MG/DL (ref 70–110)
GLUCOSE SERPL-MCNC: 271 MG/DL (ref 70–110)
GLUCOSE SERPL-MCNC: 272 MG/DL (ref 70–110)
HBA1C MFR BLD: 10 % (ref 4–5.6)
HCT VFR BLD AUTO: 23 % (ref 40–54)
HGB BLD-MCNC: 7.6 GM/DL (ref 14–18)
IMM GRANULOCYTES # BLD AUTO: 0.04 K/UL (ref 0–0.04)
IMM GRANULOCYTES NFR BLD AUTO: 0.6 % (ref 0–0.5)
LYMPHOCYTES # BLD AUTO: 1.82 K/UL (ref 1–4.8)
MCH RBC QN AUTO: 30.5 PG (ref 27–31)
MCHC RBC AUTO-ENTMCNC: 33 G/DL (ref 32–36)
MCV RBC AUTO: 92 FL (ref 82–98)
NUCLEATED RBC (/100WBC) (OHS): 0 /100 WBC
OHS QRS DURATION: 98 MS
OHS QTC CALCULATION: 400 MS
PHOSPHATE SERPL-MCNC: 4.9 MG/DL (ref 2.7–4.5)
PHOSPHATE SERPL-MCNC: 5 MG/DL (ref 2.7–4.5)
PHOSPHATE SERPL-MCNC: 5.2 MG/DL (ref 2.7–4.5)
PHOSPHATE SERPL-MCNC: 5.4 MG/DL (ref 2.7–4.5)
PLATELET # BLD AUTO: 154 K/UL (ref 150–450)
PMV BLD AUTO: 13.3 FL (ref 9.2–12.9)
POCT GLUCOSE: 275 MG/DL (ref 70–110)
POTASSIUM SERPL-SCNC: 4 MMOL/L (ref 3.5–5.1)
POTASSIUM SERPL-SCNC: 4 MMOL/L (ref 3.5–5.1)
POTASSIUM SERPL-SCNC: 4.3 MMOL/L (ref 3.5–5.1)
RBC # BLD AUTO: 2.49 M/UL (ref 4.6–6.2)
RELATIVE EOSINOPHIL (OHS): 2 %
RELATIVE LYMPHOCYTE (OHS): 28.6 % (ref 18–48)
RELATIVE MONOCYTE (OHS): 7.7 % (ref 4–15)
RELATIVE NEUTROPHIL (OHS): 60.9 % (ref 38–73)
SODIUM SERPL-SCNC: 138 MMOL/L (ref 136–145)
SODIUM SERPL-SCNC: 138 MMOL/L (ref 136–145)
SODIUM SERPL-SCNC: 139 MMOL/L (ref 136–145)
SODIUM SERPL-SCNC: 139 MMOL/L (ref 136–145)
SODIUM SERPL-SCNC: 140 MMOL/L (ref 136–145)
SODIUM SERPL-SCNC: 141 MMOL/L (ref 136–145)
SODIUM SERPL-SCNC: 141 MMOL/L (ref 136–145)
WBC # BLD AUTO: 6.36 K/UL (ref 3.9–12.7)

## 2025-07-30 PROCEDURE — 99214 OFFICE O/P EST MOD 30 MIN: CPT | Mod: NTX,,, | Performed by: INTERNAL MEDICINE

## 2025-07-30 PROCEDURE — G0378 HOSPITAL OBSERVATION PER HR: HCPCS | Mod: NTX

## 2025-07-30 PROCEDURE — 96372 THER/PROPH/DIAG INJ SC/IM: CPT | Performed by: NURSE PRACTITIONER

## 2025-07-30 PROCEDURE — 36415 COLL VENOUS BLD VENIPUNCTURE: CPT | Mod: NTX | Performed by: STUDENT IN AN ORGANIZED HEALTH CARE EDUCATION/TRAINING PROGRAM

## 2025-07-30 PROCEDURE — 82310 ASSAY OF CALCIUM: CPT | Mod: NTX | Performed by: STUDENT IN AN ORGANIZED HEALTH CARE EDUCATION/TRAINING PROGRAM

## 2025-07-30 PROCEDURE — 80048 BASIC METABOLIC PNL TOTAL CA: CPT | Mod: 91,NTX | Performed by: STUDENT IN AN ORGANIZED HEALTH CARE EDUCATION/TRAINING PROGRAM

## 2025-07-30 PROCEDURE — 80048 BASIC METABOLIC PNL TOTAL CA: CPT | Mod: 59,NTX | Performed by: STUDENT IN AN ORGANIZED HEALTH CARE EDUCATION/TRAINING PROGRAM

## 2025-07-30 PROCEDURE — 36415 COLL VENOUS BLD VENIPUNCTURE: CPT | Mod: NTX | Performed by: NURSE PRACTITIONER

## 2025-07-30 PROCEDURE — 63600175 PHARM REV CODE 636 W HCPCS: Mod: NTX | Performed by: NURSE PRACTITIONER

## 2025-07-30 PROCEDURE — 25000003 PHARM REV CODE 250: Mod: NTX | Performed by: INTERNAL MEDICINE

## 2025-07-30 PROCEDURE — 85025 COMPLETE CBC W/AUTO DIFF WBC: CPT | Mod: NTX | Performed by: NURSE PRACTITIONER

## 2025-07-30 PROCEDURE — 25000003 PHARM REV CODE 250: Mod: NTX | Performed by: NURSE PRACTITIONER

## 2025-07-30 PROCEDURE — 63600175 PHARM REV CODE 636 W HCPCS: Mod: NTX | Performed by: STUDENT IN AN ORGANIZED HEALTH CARE EDUCATION/TRAINING PROGRAM

## 2025-07-30 RX ORDER — HYDRALAZINE HYDROCHLORIDE 20 MG/ML
10 INJECTION INTRAMUSCULAR; INTRAVENOUS EVERY 6 HOURS PRN
Status: DISCONTINUED | OUTPATIENT
Start: 2025-07-30 | End: 2025-07-30 | Stop reason: HOSPADM

## 2025-07-30 RX ORDER — SODIUM BICARBONATE 325 MG/1
650 TABLET ORAL 2 TIMES DAILY
Status: DISCONTINUED | OUTPATIENT
Start: 2025-07-30 | End: 2025-07-30 | Stop reason: HOSPADM

## 2025-07-30 RX ADMIN — CLONIDINE HYDROCHLORIDE 0.1 MG: 0.1 TABLET ORAL at 08:07

## 2025-07-30 RX ADMIN — DILTIAZEM HYDROCHLORIDE 240 MG: 120 CAPSULE, COATED, EXTENDED RELEASE ORAL at 08:07

## 2025-07-30 RX ADMIN — CARVEDILOL 6.25 MG: 6.25 TABLET, FILM COATED ORAL at 08:07

## 2025-07-30 RX ADMIN — SODIUM BICARBONATE 650 MG: 325 TABLET ORAL at 10:07

## 2025-07-30 RX ADMIN — ASPIRIN 81 MG CHEWABLE TABLET 81 MG: 81 TABLET CHEWABLE at 08:07

## 2025-07-30 RX ADMIN — PRAVASTATIN SODIUM 40 MG: 40 TABLET ORAL at 10:07

## 2025-07-30 RX ADMIN — SODIUM ZIRCONIUM CYCLOSILICATE 10 G: 10 POWDER, FOR SUSPENSION ORAL at 08:07

## 2025-07-30 RX ADMIN — INSULIN ASPART 3 UNITS: 100 INJECTION, SOLUTION INTRAVENOUS; SUBCUTANEOUS at 08:07

## 2025-07-30 RX ADMIN — HYDRALAZINE HYDROCHLORIDE 10 MG: 20 INJECTION INTRAMUSCULAR; INTRAVENOUS at 04:07

## 2025-07-30 RX ADMIN — HEPARIN SODIUM 5000 UNITS: 5000 INJECTION INTRAVENOUS; SUBCUTANEOUS at 05:07

## 2025-07-30 NOTE — H&P
"  [facility]  [facility] Medicine  History & Physical    Patient Name: Filippo Martinez  MRN: [id number]  Patient Class: OP- Observation  Admission Date: 7/29/2025  Attending Physician: Destini Acevedo MD   Primary Care Provider: Rohan White MD         Patient information was obtained from patient, past medical records, and ER records.     Subjective:     Principal Problem:Hyperkalemia    Chief Complaint:   Chief Complaint   Patient presents with    Dizziness     Dizziness starting at 0700 this morning with position changes. Denies any unilateral weakness or blurred vision. Pt Jaundice. No slurred speech noted. Dr. Colon assessing pt in triage. Steady gait noted per EMS         HPI: 70-year-old male with a past medical history hypertension, hyperlipidemia, type 2 diabetes mellitus, CKD 4, TIA and prostate CA presented to [facility] ED 7/29/25 with chief complaint of generalized weakness, dizziness and "shaking." Onset 7 am. Patient denies unilateral weakness, visual disturbance and  facial droop. He denies chest pain, SOB,  abdominal pain and n/v. He notes compliance with home medications.    ED evaluation:  Patient afebrile on arrival, hemodynamics stable.  Initial labs remarkable for hemoglobin 7.7, hematocrit 23, potassium 7.6, BUN 42, creatinine 4.4, blood glucose 379.  CT head negative for acute intracranial abnormality.  EKG shows SR. S/p calcium, albuterol, insulin/D50, Lokelma 10g, LR x 2L and lasix 80mg IV in the ED. Nephrology consulted. Patient placed in observation and has a medicine for further evaluation and treatment.    Past Medical History:   Diagnosis Date    Anemia     Cataract     COVID-19     Diabetes mellitus, type 2     Disorder of kidney and ureter     History of transient ischemic attack (TIA)     Hyperlipidemia     Hypertension 2004    Obstructive uropathy     Personal history of transient ischemic attack     Prostate cancer     Sickle cell trait     Syphilis, " unspecified     Transient cerebral ischemic attack     Vitamin D deficiency        Past Surgical History:   Procedure Laterality Date    CATARACT EXTRACTION W/  INTRAOCULAR LENS IMPLANT Right 07/24/2020    CATARACT EXTRACTION W/  INTRAOCULAR LENS IMPLANT Left 08/04/2020    CYSTOSCOPY W/ URETERAL STENT PLACEMENT N/A 06/13/2021    Procedure: CYSTOSCOPY, WITH URETERAL STENT INSERTION;  Surgeon: APOORVA Salazar MD;  Location: [facility];  Service: Urology;  Laterality: N/A;    EYE SURGERY      PROSTATECTOMY  05/19/2017    PROSTATECTOMY, RADICAL, LAPAROSCOPIC, RETROPUBIC APPROACH         Review of patient's allergies indicates:  No Known Allergies    No current facility-administered medications on file prior to encounter.     Current Outpatient Medications on File Prior to Encounter   Medication Sig    aspirin 81 MG Chew Take 81 mg by mouth once daily.    blood sugar diagnostic Strp 1 each by Misc.(Non-Drug; Combo Route) route 3 (three) times daily.    blood-glucose meter kit Check blood sugar three times daily and keep a log of your results to take to your primary care provider.    captopriL (CAPOTEN) 50 MG tablet Take 50 mg by mouth 2 (two) times daily.    carvediloL (COREG) 6.25 MG tablet Take 6.25 mg by mouth 2 (two) times daily.    cloNIDine (CATAPRES) 0.1 MG tablet Take 0.1 mg by mouth 2 (two) times daily.    diltiaZEM (TIAZAC) 240 MG Cs24 Take 240 mg by mouth once daily.    doxazosin (CARDURA) 4 MG tablet Take 8 mg by mouth every evening.    dulaglutide (TRULICITY) 0.75 mg/0.5 mL pen injector Inject 0.75 mg into the skin every 7 days. Mondays    FARXIGA 10 mg tablet Take 10 mg by mouth once daily.    ferrous gluconate (FERGON) 324 MG tablet Take 1 tablet (324 mg total) by mouth 2 (two) times daily before meals. (Patient taking differently: Take 324 mg by mouth daily with breakfast.)    multivit-min-FA-lycopen-lutein (CENTRUM SILVER MEN) 300-600-300 mcg Tab Take 1 tablet by mouth once daily.    ondansetron  (ZOFRAN-ODT) 8 MG TbDL Take 1 tablet (8 mg total) by mouth every 8 (eight) hours as needed (nausea).    pravastatin (PRAVACHOL) 40 MG tablet Take 40 mg by mouth once daily.    TRADJENTA 5 mg Tab tablet Take 5 mg by mouth once daily.     Family History       Problem Relation (Age of Onset)    Asthma Father    Diabetes Mother, Father    Hyperlipidemia Mother, Father          Tobacco Use    Smoking status: Never    Smokeless tobacco: Never   Vaping Use    Vaping status: Never Used   Substance and Sexual Activity    Alcohol use: No    Drug use: No    Sexual activity: Yes     Partners: Female     Review of Systems   Constitutional:  Positive for fatigue. Negative for chills and fever.   Eyes:  Negative for photophobia.   Respiratory:  Negative for cough, chest tightness, shortness of breath and wheezing.    Cardiovascular:  Negative for chest pain, palpitations and leg swelling.   Gastrointestinal:  Negative for abdominal pain, diarrhea, nausea and vomiting.   Genitourinary:  Negative for dysuria, flank pain and hematuria.   Musculoskeletal:  Negative for back pain, myalgias and neck pain.   Skin:  Negative for rash and wound.   Neurological:  Positive for dizziness and weakness. Negative for syncope and headaches.   Psychiatric/Behavioral:  Negative for agitation.      Objective:     Vital Signs (Most Recent):  Temp: 98.4 °F (36.9 °C) (07/29/25 1456)  Pulse: 91 (07/29/25 1900)  Resp: 18 (07/29/25 1543)  BP: (!) 194/88 (07/29/25 1600)  SpO2: 100 % (07/29/25 1900) Vital Signs (24h Range):  Temp:  [98.4 °F (36.9 °C)] 98.4 °F (36.9 °C)  Pulse:  [65-92] 91  Resp:  [18] 18  SpO2:  [99 %-100 %] 100 %  BP: (161-194)/(84-88) 194/88     Weight: 72.6 kg (160 lb)  Body mass index is 25.59 kg/m².     Physical Exam  Constitutional:       General: He is not in acute distress.     Appearance: He is well-developed.   HENT:      Head: Normocephalic and atraumatic.   Eyes:      Conjunctiva/sclera: Conjunctivae normal.      Pupils: Pupils  are equal, round, and reactive to light.   Neck:      Vascular: No JVD.   Cardiovascular:      Rate and Rhythm: Normal rate and regular rhythm.      Heart sounds: Normal heart sounds.   Pulmonary:      Effort: Pulmonary effort is normal. No respiratory distress.      Breath sounds: Normal breath sounds. No wheezing.   Abdominal:      General: Bowel sounds are normal. There is no distension.      Palpations: Abdomen is soft.      Tenderness: There is no abdominal tenderness. There is no guarding.   Musculoskeletal:         General: No tenderness. Normal range of motion.      Cervical back: Normal range of motion and neck supple.   Skin:     General: Skin is warm and dry.      Capillary Refill: Capillary refill takes less than 2 seconds.      Findings: No erythema.   Neurological:      General: No focal deficit present.      Mental Status: He is alert and oriented to person, place, and time.      Cranial Nerves: No cranial nerve deficit.   Psychiatric:         Behavior: Behavior normal.              CRANIAL NERVES     CN III, IV, VI   Pupils are equal, round, and reactive to light.       Significant Labs: All pertinent labs within the past 24 hours have been reviewed.  CBC:   Recent Labs   Lab 07/29/25  1526 07/29/25  1527   WBC 7.09  --    HGB 7.7*  --    HCT 23.4* 23*   *  --      CMP:   Recent Labs   Lab 07/29/25  1526 07/29/25  1703   * 136   K 7.6* 5.4*  5.4*    108   CO2 20* 19*   * 379*   BUN 42* 43*   CREATININE 4.4* 4.3*   CALCIUM 8.5* 9.4   PROT 6.5  --    ALBUMIN 3.7 4.0   BILITOT 0.5  --    ALKPHOS 108  --    AST 17  --    ALT 11  --    ANIONGAP 5* 9         Significant Imaging: I have reviewed all pertinent imaging results/findings within the past 24 hours.  Assessment/Plan:     Assessment & Plan  Hyperkalemia  Hyperkalemia is likely due to KOURTNEY.The patients most recent potassium results are listed below.  Recent Labs     07/29/25  1526 07/29/25  1703   K 7.6* 5.4*  5.4*      Plan  - Monitor for arrhythmias with EKG and/or continuous telemetry.   - Treat the hyperkalemia with Potassium Binders, Calcium gluconate, IV insulin and dextrose, Nebulized albuterol sulfate, Furosemide, and Sodium Bicarbonate.   - Monitor potassium: Every 4 hours  - The patient's hyperkalemia is improving          Hyperlipidemia  -continue statin  Type 2 diabetes mellitus with hyperglycemia, without long-term current use of insulin  Patient's FSGs are controlled on current medication regimen.  Last A1c reviewed-   Lab Results   Component Value Date    HGBA1C 6.1 (H) 08/29/2024     Most recent fingerstick glucose reviewed-   Recent Labs   Lab 07/29/25  1525   POCTGLUCOSE 337*     Current correctional scale  Low  Maintain anti-hyperglycemic dose as follows-   Antihyperglycemics (From admission, onward)      None          Hold Oral hypoglycemics while patient is in the hospital.     Gastroesophageal reflux disease without esophagitis  - continue PPI     Anemia in stage 4 chronic kidney disease  Anemia is likely due to chronic disease due to Chronic Kidney Disease. Most recent hemoglobin and hematocrit are listed below.  Recent Labs     07/29/25  1526 07/29/25  1527   HGB 7.7*  --    HCT 23.4* 23*     Plan  - Monitor serial CBC: Daily  - Transfuse PRBC if patient becomes hemodynamically unstable, symptomatic or H/H drops below 7/21.  - Patient has not received any PRBC transfusions to date  - Patient's anemia is currently stable    Acute renal failure superimposed on stage 4 chronic kidney disease  BUN/Cr 43/4.4 on admit, baseline Cr 3.8  -Nephrology consulted  - monitor telemetry  -avoid nephrotoxic medications, renal dose medication, avoid hypotension  -renal diet  -  monitor intake and output   VTE Risk Mitigation (From admission, onward)           Ordered     heparin (porcine) injection 5,000 Units  Every 8 hours         07/29/25 1851     IP VTE HIGH RISK PATIENT  Once         07/29/25 1851     Place  sequential compression device  Until discontinued         07/29/25 1851                                     On 07/29/2025, patient should be placed in hospital observation services under my care in collaboration with Dr. Acevedo.        Koki Cota, NP  Department of Hospital Medicine  [facility] [place] - Emergency Dept

## 2025-07-30 NOTE — ASSESSMENT & PLAN NOTE
BUN/Cr 43/4.4 on admit, baseline Cr 3.8  -Nephrology consulted  - monitor telemetry  -avoid nephrotoxic medications, renal dose medication, avoid hypotension  -renal diet  -  monitor intake and output

## 2025-07-30 NOTE — HPI
"70-year-old male with a past medical history hypertension, hyperlipidemia, type 2 diabetes mellitus, CKD 4, TIA and prostate CA presented to [facility] ED 7/29/25 with chief complaint of generalized weakness, dizziness and "shaking." Onset 7 am. Patient denies unilateral weakness, visual disturbance and  facial droop. He denies chest pain, SOB,  abdominal pain and n/v. He notes compliance with home medications.    ED evaluation:  Patient afebrile on arrival, hemodynamics stable.  Initial labs remarkable for hemoglobin 7.7, hematocrit 23, potassium 7.6, BUN 42, creatinine 4.4, blood glucose 379.  CT head negative for acute intracranial abnormality.  EKG shows SR. S/p calcium, albuterol, insulin/D50, Lokelma 10g, LR x 2L and lasix 80mg IV in the ED. Nephrology consulted. Patient placed in observation and has a medicine for further evaluation and treatment.  "

## 2025-07-30 NOTE — SUBJECTIVE & OBJECTIVE
Interval History: UOP 3.25L yesterday. No events overnight. Doing okay this morning. Notes increased UOP. BM x1 yesterday. No SOB or leg swelling. Notes increased intake of coconut, gogo, avocados, and black beans. We discussed options of decreasing high K foods, starting Lokelma QD, or stopping irbesartan. He is most interested in the 1st option and asked for a high K diet handout.     Review of patient's allergies indicates:  No Known Allergies  Current Facility-Administered Medications   Medication Frequency    acetaminophen tablet 650 mg Q4H PRN    albuterol-ipratropium 2.5 mg-0.5 mg/3 mL nebulizer solution 3 mL Q4H PRN    aspirin chewable tablet 81 mg Daily    carvediloL tablet 6.25 mg BID    cloNIDine tablet 0.1 mg BID    dextrose 50% injection 12.5 g PRN    dextrose 50% injection 25 g PRN    dextrose 50% injection 25 g PRN    diltiaZEM 24 hr capsule 240 mg Daily    doxazosin tablet 8 mg QHS    glucagon (human recombinant) injection 1 mg PRN    glucose chewable tablet 16 g PRN    glucose chewable tablet 24 g PRN    heparin (porcine) injection 5,000 Units Q8H    hydrALAZINE injection 10 mg Q6H PRN    insulin aspart U-100 pen 0-5 Units QID (AC + HS) PRN    melatonin tablet 6 mg Nightly PRN    naloxone 0.4 mg/mL injection 0.02 mg PRN    ondansetron injection 4 mg Q8H PRN    pravastatin tablet 40 mg Daily    sodium bicarbonate tablet 650 mg BID    sodium chloride 0.9% flush 10 mL Q8H PRN     Current Outpatient Medications   Medication    aspirin 81 MG Chew    blood sugar diagnostic Strp    blood-glucose meter kit    captopriL (CAPOTEN) 50 MG tablet    carvediloL (COREG) 6.25 MG tablet    cloNIDine (CATAPRES) 0.1 MG tablet    diltiaZEM (TIAZAC) 240 MG Cs24    doxazosin (CARDURA) 4 MG tablet    dulaglutide (TRULICITY) 0.75 mg/0.5 mL pen injector    FARXIGA 10 mg tablet    ferrous gluconate (FERGON) 324 MG tablet    multivit-min-FA-lycopen-lutein (CENTRUM SILVER MEN) 300-600-300 mcg Tab    ondansetron (ZOFRAN-ODT) 8  MG TbDL    pravastatin (PRAVACHOL) 40 MG tablet    TRADJENTA 5 mg Tab tablet       Objective:     Vital Signs (Most Recent):  Temp: 98.4 °F (36.9 °C) (07/29/25 1456)  Pulse: 72 (07/30/25 1300)  Resp: 16 (07/30/25 1300)  BP: (!) 152/88 (07/30/25 1300)  SpO2: 99 % (07/30/25 1300) Vital Signs (24h Range):  Temp:  [98.4 °F (36.9 °C)] 98.4 °F (36.9 °C)  Pulse:  [65-92] 72  Resp:  [14-18] 16  SpO2:  [95 %-100 %] 99 %  BP: (145-199)/(77-96) 152/88     Weight: 72.6 kg (160 lb) (07/29/25 1456)  Body mass index is 25.59 kg/m².  Body surface area is 1.84 meters squared.    I/O last 3 completed shifts:  In: 370.3 [I.V.:320.3; IV Piggyback:50]  Out: 3250 [Urine:3250]     Physical Exam  Vitals and nursing note reviewed.   Constitutional:       General: He is awake. He is not in acute distress.     Appearance: Normal appearance. He is well-developed.   HENT:      Head: Normocephalic and atraumatic.      Nose: Nose normal.      Mouth/Throat:      Mouth: Mucous membranes are moist.   Eyes:      Extraocular Movements: Extraocular movements intact.      Conjunctiva/sclera: Conjunctivae normal.   Cardiovascular:      Rate and Rhythm: Normal rate and regular rhythm.   Pulmonary:      Effort: Pulmonary effort is normal.      Breath sounds: Normal breath sounds.   Abdominal:      General: There is no distension.      Palpations: Abdomen is soft.   Musculoskeletal:         General: No tenderness or signs of injury.      Right lower leg: No edema.      Left lower leg: No edema.   Skin:     General: Skin is warm and dry.      Findings: No erythema or rash.   Neurological:      Mental Status: He is alert.   Psychiatric:         Mood and Affect: Mood normal.         Behavior: Behavior normal.          Significant Labs:  CBC:   Recent Labs   Lab 07/30/25  0345   WBC 6.36   RBC 2.49*   HGB 7.6*   HCT 23.0*      MCV 92   MCH 30.5   MCHC 33.0     CMP:   Recent Labs   Lab 07/29/25  1526 07/29/25  1703 07/30/25  1227   *   < > 264*   264*   CALCIUM 8.5*   < > 9.0  9.0   ALBUMIN 3.7   < > 3.7   PROT 6.5  --   --    *   < > 138  138   K 7.6*   < > 4.3  4.3   CO2 20*   < > 24  24      < > 104  104   BUN 42*   < > 37*  37*   CREATININE 4.4*   < > 4.0*  4.0*   ALKPHOS 108  --   --    ALT 11  --   --    AST 17  --   --    BILITOT 0.5  --   --     < > = values in this interval not displayed.     All labs within the past 24 hours have been reviewed.     Significant Imaging:  Labs: Reviewed

## 2025-07-30 NOTE — SUBJECTIVE & OBJECTIVE
Past Medical History:   Diagnosis Date    Anemia     Cataract     COVID-19     Diabetes mellitus, type 2     Disorder of kidney and ureter     History of transient ischemic attack (TIA)     Hyperlipidemia     Hypertension 2004    Obstructive uropathy     Personal history of transient ischemic attack     Prostate cancer     Sickle cell trait     Syphilis, unspecified     Transient cerebral ischemic attack     Vitamin D deficiency        Past Surgical History:   Procedure Laterality Date    CATARACT EXTRACTION W/  INTRAOCULAR LENS IMPLANT Right 07/24/2020    CATARACT EXTRACTION W/  INTRAOCULAR LENS IMPLANT Left 08/04/2020    CYSTOSCOPY W/ URETERAL STENT PLACEMENT N/A 06/13/2021    Procedure: CYSTOSCOPY, WITH URETERAL STENT INSERTION;  Surgeon: APOORVA Salazar MD;  Location: [facility];  Service: Urology;  Laterality: N/A;    EYE SURGERY      PROSTATECTOMY  05/19/2017    PROSTATECTOMY, RADICAL, LAPAROSCOPIC, RETROPUBIC APPROACH         Review of patient's allergies indicates:  No Known Allergies  Current Facility-Administered Medications   Medication Frequency    acetaminophen tablet 650 mg Q4H PRN    albuterol-ipratropium 2.5 mg-0.5 mg/3 mL nebulizer solution 3 mL Q4H PRN    [START ON 7/30/2025] aspirin chewable tablet 81 mg Daily    calcium gluconate 1 g in NS IVPB (premixed) Q10 Min PRN    carvediloL tablet 6.25 mg BID    cloNIDine tablet 0.1 mg BID    dextrose 50% injection 12.5 g PRN    dextrose 50% injection 25 g PRN    dextrose 50% injection 25 g PRN    [START ON 7/30/2025] diltiaZEM 24 hr capsule 240 mg Daily    doxazosin tablet 8 mg QHS    glucagon (human recombinant) injection 1 mg PRN    glucose chewable tablet 16 g PRN    glucose chewable tablet 24 g PRN    heparin (porcine) injection 5,000 Units Q8H    insulin aspart U-100 pen 0-5 Units QID (AC + HS) PRN    lactulose 20 gram/30 mL solution Soln 30 g TID    melatonin tablet 6 mg Nightly PRN    naloxone 0.4 mg/mL injection 0.02 mg PRN    ondansetron injection  4 mg Q8H PRN    [START ON 7/30/2025] pravastatin tablet 40 mg Daily    sodium bicarbonate 150 mEq in D5W 1,000 mL infusion Continuous    sodium chloride 0.9% flush 10 mL Q8H PRN    sodium zirconium cyclosilicate packet 10 g TID     Current Outpatient Medications   Medication    aspirin 81 MG Chew    blood sugar diagnostic Strp    blood-glucose meter kit    captopriL (CAPOTEN) 50 MG tablet    carvediloL (COREG) 6.25 MG tablet    cloNIDine (CATAPRES) 0.1 MG tablet    diltiaZEM (TIAZAC) 240 MG Cs24    doxazosin (CARDURA) 4 MG tablet    dulaglutide (TRULICITY) 0.75 mg/0.5 mL pen injector    FARXIGA 10 mg tablet    ferrous gluconate (FERGON) 324 MG tablet    multivit-min-FA-lycopen-lutein (CENTRUM SILVER MEN) 300-600-300 mcg Tab    ondansetron (ZOFRAN-ODT) 8 MG TbDL    pravastatin (PRAVACHOL) 40 MG tablet    TRADJENTA 5 mg Tab tablet     Family History       Problem Relation (Age of Onset)    Asthma Father    Diabetes Mother, Father    Hyperlipidemia Mother, Father          Tobacco Use    Smoking status: Never    Smokeless tobacco: Never   Vaping Use    Vaping status: Never Used   Substance and Sexual Activity    Alcohol use: No    Drug use: No    Sexual activity: Yes     Partners: Female     Review of Systems   All other systems reviewed and are negative.    Objective:     Vital Signs (Most Recent):  Temp: 98.4 °F (36.9 °C) (07/29/25 1456)  Pulse: 85 (07/29/25 2100)  Resp: 18 (07/29/25 1543)  BP: (!) 190/93 (07/29/25 2021)  SpO2: 100 % (07/29/25 2100) Vital Signs (24h Range):  Temp:  [98.4 °F (36.9 °C)] 98.4 °F (36.9 °C)  Pulse:  [65-92] 85  Resp:  [18] 18  SpO2:  [99 %-100 %] 100 %  BP: (161-199)/(84-95) 190/93     Weight: 72.6 kg (160 lb) (07/29/25 1456)  Body mass index is 25.59 kg/m².  Body surface area is 1.84 meters squared.    I/O last 3 completed shifts:  In: 50 [IV Piggyback:50]  Out: -      Physical Exam  Vitals and nursing note reviewed.   Constitutional:       General: He is awake. He is not in acute  "distress.     Appearance: Normal appearance. He is well-developed.   HENT:      Head: Normocephalic and atraumatic.      Nose: Nose normal.      Mouth/Throat:      Mouth: Mucous membranes are moist.   Eyes:      Extraocular Movements: Extraocular movements intact.      Conjunctiva/sclera: Conjunctivae normal.   Cardiovascular:      Rate and Rhythm: Normal rate and regular rhythm.   Pulmonary:      Effort: Pulmonary effort is normal.      Breath sounds: Normal breath sounds.   Abdominal:      General: There is no distension.      Palpations: Abdomen is soft.   Musculoskeletal:         General: No tenderness or signs of injury.      Right lower leg: No edema.      Left lower leg: No edema.   Skin:     General: Skin is warm and dry.      Findings: No erythema or rash.   Neurological:      Mental Status: He is alert.   Psychiatric:         Mood and Affect: Mood normal.         Behavior: Behavior normal.          Significant Labs:  CBC:   Recent Labs   Lab 07/29/25  1526 07/29/25  1527   WBC 7.09  --    RBC 2.52*  --    HGB 7.7*  --    HCT 23.4* 23*   *  --    MCV 93  --    MCH 30.6  --    MCHC 32.9  --      CMP:   Recent Labs   Lab 07/29/25  1526 07/29/25  1703   * 379*   CALCIUM 8.5* 9.4   ALBUMIN 3.7 4.0   PROT 6.5  --    * 136   K 7.6* 5.4*  5.4*   CO2 20* 19*    108   BUN 42* 43*   CREATININE 4.4* 4.3*   ALKPHOS 108  --    ALT 11  --    AST 17  --    BILITOT 0.5  --      No results for input(s): "COLORU", "CLARITYU", "SPECGRAV", "PHUR", "PROTEINUA", "GLUCOSEU", "BILIRUBINCON", "BLOODU", "WBCU", "RBCU", "BACTERIA", "MUCUS", "NITRITE", "LEUKOCYTESUR", "UROBILINOGEN", "HYALINECASTS" in the last 168 hours.  All labs within the past 24 hours have been reviewed.    Significant Imaging:  Labs: Reviewed  CT: Reviewed    "

## 2025-07-30 NOTE — PLAN OF CARE
Case Management Final Discharge Note    Discharge Disposition: Home    New DME ordered / company name: None    Relevant SDOH / Transition of Care Barriers:  none    Person available to provide assistance at home when needed and their contact information: spouse    Scheduled followup appointment:    Follow-up Information       Angus Jurado - Follow up on 7/31/2025.    Why: Hospital Followup:  Thursday, July 31, 2025 at 3:10 p.m.  Contact information:  [place] [id number]  [phone]                           Referrals placed: None    Transportation: Spouse        Patient and family educated on discharge services and updated on DC plan. Bedside RN notified, patient clear to discharge from Case Management Perspective.      07/30/25 1047   Final Note   Assessment Type Final Discharge Note   Anticipated Discharge Disposition Home   What phone number can be called within the next 1-3 days to see how you are doing after discharge? [phone]   Hospital Resources/Appts/Education Provided Appointments scheduled and added to AVS   Post-Acute Status   Discharge Delays (!) Waiting for Provider to Speak to Patient  (Waiting for nephrology re-evaluation.)

## 2025-07-30 NOTE — HPI
St. Lima is a 71 yo male with HTN, T2DM, CHF, h/o obstructive uropathy, h/o hyperkalemia, and CKD stage 4 who presented to the ED today with ataxia. Workup concerning for hyperkalemia and KOURTNEY. Nephrology consulted for hyperkalemia. Prior records obtained and reviewed. He is followed outpatient by Dr. Lee; last visit 6/19/25. His baseline Cr appears to be 3.0-3.3 with GFR 19-22% in 2024; but Cr elevated at 3.85 (GFR 16) on 6/16/25. His Cr is elevated at 4.4 today and associated with K 7.6. No EKG changes. He is on irbesartan at home. He notes increased intake of coconuts in addition to avocado. He received 2L IVF, lasix 80mg IV, albuterol, insulin/D50, and Lokelma 10g in the ED. K improved from 7.6 --> 5.4.

## 2025-07-30 NOTE — PROGRESS NOTES
[facility] [place] [facility]  Nephrology  Progress Note    Patient Name: Filippo Martinez  MRN: [id number]  Admission Date: 7/29/2025  Hospital Length of Stay: 0 days  Attending Provider: No att. providers found   Primary Care Physician: No primary care provider on file.  Principal Problem:Hyperkalemia    Subjective:     HPI: St. Lima is a 69 yo male with HTN, T2DM, CHF, h/o obstructive uropathy, h/o hyperkalemia, and CKD stage 4 who presented to the ED today with ataxia. Workup concerning for hyperkalemia and KOURTNEY. Nephrology consulted for hyperkalemia. Prior records obtained and reviewed. He is followed outpatient by Dr. Lee; last visit 6/19/25. His baseline Cr appears to be 3.0-3.3 with GFR 19-22% in 2024; but Cr elevated at 3.85 (GFR 16) on 6/16/25. His Cr is elevated at 4.4 today and associated with K 7.6. No EKG changes. He is on irbesartan at home. He notes increased intake of coconuts in addition to avocado. He received 2L IVF, lasix 80mg IV, albuterol, insulin/D50, and Lokelma 10g in the ED. K improved from 7.6 --> 5.4.     Interval History: UOP 3.25L yesterday. No events overnight. Doing okay this morning. Notes increased UOP. BM x1 yesterday. No SOB or leg swelling. Notes increased intake of coconut, gogo, avocados, and black beans. We discussed options of decreasing high K foods, starting Lokelma QD, or stopping irbesartan. He is most interested in the 1st option and asked for a high K diet handout.     Review of patient's allergies indicates:  No Known Allergies  Current Facility-Administered Medications   Medication Frequency    acetaminophen tablet 650 mg Q4H PRN    albuterol-ipratropium 2.5 mg-0.5 mg/3 mL nebulizer solution 3 mL Q4H PRN    aspirin chewable tablet 81 mg Daily    carvediloL tablet 6.25 mg BID    cloNIDine tablet 0.1 mg BID    dextrose 50% injection 12.5 g PRN    dextrose 50% injection 25 g PRN    dextrose 50% injection 25 g PRN    diltiaZEM 24 hr capsule 240 mg Daily    doxazosin tablet 8  mg QHS    glucagon (human recombinant) injection 1 mg PRN    glucose chewable tablet 16 g PRN    glucose chewable tablet 24 g PRN    heparin (porcine) injection 5,000 Units Q8H    hydrALAZINE injection 10 mg Q6H PRN    insulin aspart U-100 pen 0-5 Units QID (AC + HS) PRN    melatonin tablet 6 mg Nightly PRN    naloxone 0.4 mg/mL injection 0.02 mg PRN    ondansetron injection 4 mg Q8H PRN    pravastatin tablet 40 mg Daily    sodium bicarbonate tablet 650 mg BID    sodium chloride 0.9% flush 10 mL Q8H PRN     Current Outpatient Medications   Medication    aspirin 81 MG Chew    blood sugar diagnostic Strp    blood-glucose meter kit    captopriL (CAPOTEN) 50 MG tablet    carvediloL (COREG) 6.25 MG tablet    cloNIDine (CATAPRES) 0.1 MG tablet    diltiaZEM (TIAZAC) 240 MG Cs24    doxazosin (CARDURA) 4 MG tablet    dulaglutide (TRULICITY) 0.75 mg/0.5 mL pen injector    FARXIGA 10 mg tablet    ferrous gluconate (FERGON) 324 MG tablet    multivit-min-FA-lycopen-lutein (CENTRUM SILVER MEN) 300-600-300 mcg Tab    ondansetron (ZOFRAN-ODT) 8 MG TbDL    pravastatin (PRAVACHOL) 40 MG tablet    TRADJENTA 5 mg Tab tablet       Objective:     Vital Signs (Most Recent):  Temp: 98.4 °F (36.9 °C) (07/29/25 1456)  Pulse: 72 (07/30/25 1300)  Resp: 16 (07/30/25 1300)  BP: (!) 152/88 (07/30/25 1300)  SpO2: 99 % (07/30/25 1300) Vital Signs (24h Range):  Temp:  [98.4 °F (36.9 °C)] 98.4 °F (36.9 °C)  Pulse:  [65-92] 72  Resp:  [14-18] 16  SpO2:  [95 %-100 %] 99 %  BP: (145-199)/(77-96) 152/88     Weight: 72.6 kg (160 lb) (07/29/25 1456)  Body mass index is 25.59 kg/m².  Body surface area is 1.84 meters squared.    I/O last 3 completed shifts:  In: 370.3 [I.V.:320.3; IV Piggyback:50]  Out: 3250 [Urine:3250]     Physical Exam  Vitals and nursing note reviewed.   Constitutional:       General: He is awake. He is not in acute distress.     Appearance: Normal appearance. He is well-developed.   HENT:      Head: Normocephalic and atraumatic.       "Nose: Nose normal.      Mouth/Throat:      Mouth: Mucous membranes are moist.   Eyes:      Extraocular Movements: Extraocular movements intact.      Conjunctiva/sclera: Conjunctivae normal.   Cardiovascular:      Rate and Rhythm: Normal rate and regular rhythm.   Pulmonary:      Effort: Pulmonary effort is normal.      Breath sounds: Normal breath sounds.   Abdominal:      General: There is no distension.      Palpations: Abdomen is soft.   Musculoskeletal:         General: No tenderness or signs of injury.      Right lower leg: No edema.      Left lower leg: No edema.   Skin:     General: Skin is warm and dry.      Findings: No erythema or rash.   Neurological:      Mental Status: He is alert.   Psychiatric:         Mood and Affect: Mood normal.         Behavior: Behavior normal.          Significant Labs:  CBC:   Recent Labs   Lab 07/30/25  0345   WBC 6.36   RBC 2.49*   HGB 7.6*   HCT 23.0*      MCV 92   MCH 30.5   MCHC 33.0     CMP:   Recent Labs   Lab 07/29/25  1526 07/29/25  1703 07/30/25  1227   *   < > 264*  264*   CALCIUM 8.5*   < > 9.0  9.0   ALBUMIN 3.7   < > 3.7   PROT 6.5  --   --    *   < > 138  138   K 7.6*   < > 4.3  4.3   CO2 20*   < > 24  24      < > 104  104   BUN 42*   < > 37*  37*   CREATININE 4.4*   < > 4.0*  4.0*   ALKPHOS 108  --   --    ALT 11  --   --    AST 17  --   --    BILITOT 0.5  --   --     < > = values in this interval not displayed.     All labs within the past 24 hours have been reviewed.     Significant Imaging:  Labs: Reviewed    Assessment/Plan:     Hyperkalemia  - K 7.6; likely due to high K diet, RASI, and KOURTNEY  - s/p IVF, lasix, lokelma, lactulose, and shifting agents  - improved to 5.4 last night and further improved to 4.0 this morning  - patient has f/u with his nephrologist next week. Okay to discharge home today  - okay to restart irbesartan upon discharge; low threshold to start Lokelma outpatient if hyperkalemia recurs   - attached "low " "K diet handout" to discharge instructions  - will route note to Dr. Lee    Metabolic acidosis  - improved s/p replacement    KOURTNEY on CKD stage 4  - baseline Cr 3.0-3.3 in 2024 and Cr 3.8 in 6/2025  - Cr 4.4 on arrival --> 4.0 today; improved and almost back to 6/2025 baseline  - outpatient f/u with Dr. Lee next week as scheduled      Thank you for your consult. I will follow-up with patient. Please contact us if you have any additional questions.    Lakisha Queen MD  Nephrology  [facility] - [place] - Emergency Dept  "

## 2025-07-30 NOTE — CONSULTS
[place] - Emergency Dept  Nephrology  Consult Note    Patient Name: Filippo Martinez  MRN: [id number]  Admission Date: 7/29/2025  Hospital Length of Stay: 0 days  Attending Provider: Destini Acevedo MD   Primary Care Physician: Rohan White MD  Principal Problem:Hyperkalemia    Inpatient consult to Nephrology  Consult performed by: Lakisha Queen MD  Consult ordered by: Clark Colon MD  Reason for consult: hyperkalemia        Subjective:     HPI: St. Lima is a 69 yo male with HTN, T2DM, CHF, h/o obstructive uropathy, h/o hyperkalemia, and CKD stage 4 who presented to the ED today with ataxia. Workup concerning for hyperkalemia and KOURTNEY. Nephrology consulted for hyperkalemia. Prior records obtained and reviewed. He is followed outpatient by Dr. Lee; last visit 6/19/25. His baseline Cr appears to be 3.0-3.3 with GFR 19-22% in 2024; but Cr elevated at 3.85 (GFR 16) on 6/16/25. His Cr is elevated at 4.4 today and associated with K 7.6. No EKG changes. He is on irbesartan at home. He notes increased intake of coconuts in addition to avocado. He received 2L IVF, lasix 80mg IV, albuterol, insulin/D50, and Lokelma 10g in the ED. K improved from 7.6 --> 5.4.     Past Medical History:   Diagnosis Date    Anemia     Cataract     COVID-19     Diabetes mellitus, type 2     Disorder of kidney and ureter     History of transient ischemic attack (TIA)     Hyperlipidemia     Hypertension 2004    Obstructive uropathy     Personal history of transient ischemic attack     Prostate cancer     Sickle cell trait     Syphilis, unspecified     Transient cerebral ischemic attack     Vitamin D deficiency        Past Surgical History:   Procedure Laterality Date    CATARACT EXTRACTION W/  INTRAOCULAR LENS IMPLANT Right 07/24/2020    CATARACT EXTRACTION W/  INTRAOCULAR LENS IMPLANT Left 08/04/2020    CYSTOSCOPY W/ URETERAL STENT PLACEMENT N/A 06/13/2021    Procedure: CYSTOSCOPY, WITH URETERAL STENT INSERTION;  Surgeon:  APOORVA Salazar MD;  Location: [facility];  Service: Urology;  Laterality: N/A;    EYE SURGERY      PROSTATECTOMY  05/19/2017    PROSTATECTOMY, RADICAL, LAPAROSCOPIC, RETROPUBIC APPROACH         Review of patient's allergies indicates:  No Known Allergies  Current Facility-Administered Medications   Medication Frequency    acetaminophen tablet 650 mg Q4H PRN    albuterol-ipratropium 2.5 mg-0.5 mg/3 mL nebulizer solution 3 mL Q4H PRN    [START ON 7/30/2025] aspirin chewable tablet 81 mg Daily    calcium gluconate 1 g in NS IVPB (premixed) Q10 Min PRN    carvediloL tablet 6.25 mg BID    cloNIDine tablet 0.1 mg BID    dextrose 50% injection 12.5 g PRN    dextrose 50% injection 25 g PRN    dextrose 50% injection 25 g PRN    [START ON 7/30/2025] diltiaZEM 24 hr capsule 240 mg Daily    doxazosin tablet 8 mg QHS    glucagon (human recombinant) injection 1 mg PRN    glucose chewable tablet 16 g PRN    glucose chewable tablet 24 g PRN    heparin (porcine) injection 5,000 Units Q8H    insulin aspart U-100 pen 0-5 Units QID (AC + HS) PRN    lactulose 20 gram/30 mL solution Soln 30 g TID    melatonin tablet 6 mg Nightly PRN    naloxone 0.4 mg/mL injection 0.02 mg PRN    ondansetron injection 4 mg Q8H PRN    [START ON 7/30/2025] pravastatin tablet 40 mg Daily    sodium bicarbonate 150 mEq in D5W 1,000 mL infusion Continuous    sodium chloride 0.9% flush 10 mL Q8H PRN    sodium zirconium cyclosilicate packet 10 g TID     Current Outpatient Medications   Medication    aspirin 81 MG Chew    blood sugar diagnostic Strp    blood-glucose meter kit    captopriL (CAPOTEN) 50 MG tablet    carvediloL (COREG) 6.25 MG tablet    cloNIDine (CATAPRES) 0.1 MG tablet    diltiaZEM (TIAZAC) 240 MG Cs24    doxazosin (CARDURA) 4 MG tablet    dulaglutide (TRULICITY) 0.75 mg/0.5 mL pen injector    FARXIGA 10 mg tablet    ferrous gluconate (FERGON) 324 MG tablet    multivit-min-FA-lycopen-lutein (CENTRUM SILVER MEN) 300-600-300 mcg Tab    ondansetron  (ZOFRAN-ODT) 8 MG TbDL    pravastatin (PRAVACHOL) 40 MG tablet    TRADJENTA 5 mg Tab tablet     Family History       Problem Relation (Age of Onset)    Asthma Father    Diabetes Mother, Father    Hyperlipidemia Mother, Father          Tobacco Use    Smoking status: Never    Smokeless tobacco: Never   Vaping Use    Vaping status: Never Used   Substance and Sexual Activity    Alcohol use: No    Drug use: No    Sexual activity: Yes     Partners: Female     Review of Systems   All other systems reviewed and are negative.    Objective:     Vital Signs (Most Recent):  Temp: 98.4 °F (36.9 °C) (07/29/25 1456)  Pulse: 85 (07/29/25 2100)  Resp: 18 (07/29/25 1543)  BP: (!) 190/93 (07/29/25 2021)  SpO2: 100 % (07/29/25 2100) Vital Signs (24h Range):  Temp:  [98.4 °F (36.9 °C)] 98.4 °F (36.9 °C)  Pulse:  [65-92] 85  Resp:  [18] 18  SpO2:  [99 %-100 %] 100 %  BP: (161-199)/(84-95) 190/93     Weight: 72.6 kg (160 lb) (07/29/25 1456)  Body mass index is 25.59 kg/m².  Body surface area is 1.84 meters squared.    I/O last 3 completed shifts:  In: 50 [IV Piggyback:50]  Out: -      Physical Exam  Vitals and nursing note reviewed.   Constitutional:       General: He is awake. He is not in acute distress.     Appearance: Normal appearance. He is well-developed.   HENT:      Head: Normocephalic and atraumatic.      Nose: Nose normal.      Mouth/Throat:      Mouth: Mucous membranes are moist.   Eyes:      Extraocular Movements: Extraocular movements intact.      Conjunctiva/sclera: Conjunctivae normal.   Cardiovascular:      Rate and Rhythm: Normal rate and regular rhythm.   Pulmonary:      Effort: Pulmonary effort is normal.      Breath sounds: Normal breath sounds.   Abdominal:      General: There is no distension.      Palpations: Abdomen is soft.   Musculoskeletal:         General: No tenderness or signs of injury.      Right lower leg: No edema.      Left lower leg: No edema.   Skin:     General: Skin is warm and dry.      Findings:  "No erythema or rash.   Neurological:      Mental Status: He is alert.   Psychiatric:         Mood and Affect: Mood normal.         Behavior: Behavior normal.          Significant Labs:  CBC:   Recent Labs   Lab 07/29/25  1526 07/29/25  1527   WBC 7.09  --    RBC 2.52*  --    HGB 7.7*  --    HCT 23.4* 23*   *  --    MCV 93  --    MCH 30.6  --    MCHC 32.9  --      CMP:   Recent Labs   Lab 07/29/25  1526 07/29/25  1703   * 379*   CALCIUM 8.5* 9.4   ALBUMIN 3.7 4.0   PROT 6.5  --    * 136   K 7.6* 5.4*  5.4*   CO2 20* 19*    108   BUN 42* 43*   CREATININE 4.4* 4.3*   ALKPHOS 108  --    ALT 11  --    AST 17  --    BILITOT 0.5  --      No results for input(s): "COLORU", "CLARITYU", "SPECGRAV", "PHUR", "PROTEINUA", "GLUCOSEU", "BILIRUBINCON", "BLOODU", "WBCU", "RBCU", "BACTERIA", "MUCUS", "NITRITE", "LEUKOCYTESUR", "UROBILINOGEN", "HYALINECASTS" in the last 168 hours.  All labs within the past 24 hours have been reviewed.    Significant Imaging:  Labs: Reviewed  CT: Reviewed    Assessment/Plan:     Hyperkalemia  - K 7.6; likely due to high K diet, RASI, and KOURTNEY  - improved to 5.4 s/p shifting/eliminating agents  - continue Lokelma 10g TID. Also ordered lactulose 30g TID (for GI elimination of K). Bicarb gtt at 100cc/hr  - labs q4h. Monitor on telemetry. Will initiate dialysis if hyperK worsens    KOURTNEY on CKD stage 4  - baseline Cr 3.0-3.3 in 2024 and Cr 3.8 in 6/2025  - Cr 4.4 on arrival --> 4.3 this afternoon. Stable and not too far from baseline  - K improving with medical management. No emergent need for RRT at this time but will continue to monitor closely  - labs q4h  - strict I/Os. Low threshold to place carty if needed      Critical care time spent on the evaluation and treatment of severe organ dysfunction, review of pertinent labs and imaging studies, discussions with Dr. Colon and nursing staff, and discussions with patient/family: 35 minutes.    Thank you for your consult. I will " follow-up with patient. Please contact us if you have any additional questions.    Lakisha Queen MD  Nephrology  [facility] - Emergency Dept      Addendum:  PM labs with K 5.1 and bicarb 22. Stopping bicarb gtt. Starting NaBicarb 650mg TID. Stopping lactulose. Continue Lokelma.  Continue labs q4h.    BL

## 2025-07-30 NOTE — SUBJECTIVE & OBJECTIVE
Past Medical History:   Diagnosis Date    Anemia     Cataract     COVID-19     Diabetes mellitus, type 2     Disorder of kidney and ureter     History of transient ischemic attack (TIA)     Hyperlipidemia     Hypertension 2004    Obstructive uropathy     Personal history of transient ischemic attack     Prostate cancer     Sickle cell trait     Syphilis, unspecified     Transient cerebral ischemic attack     Vitamin D deficiency        Past Surgical History:   Procedure Laterality Date    CATARACT EXTRACTION W/  INTRAOCULAR LENS IMPLANT Right 07/24/2020    CATARACT EXTRACTION W/  INTRAOCULAR LENS IMPLANT Left 08/04/2020    CYSTOSCOPY W/ URETERAL STENT PLACEMENT N/A 06/13/2021    Procedure: CYSTOSCOPY, WITH URETERAL STENT INSERTION;  Surgeon: APOORVA Salazar MD;  Location: [facility] OR;  Service: Urology;  Laterality: N/A;    EYE SURGERY      PROSTATECTOMY  05/19/2017    PROSTATECTOMY, RADICAL, LAPAROSCOPIC, RETROPUBIC APPROACH         Review of patient's allergies indicates:  No Known Allergies    No current facility-administered medications on file prior to encounter.     Current Outpatient Medications on File Prior to Encounter   Medication Sig    aspirin 81 MG Chew Take 81 mg by mouth once daily.    blood sugar diagnostic Strp 1 each by Misc.(Non-Drug; Combo Route) route 3 (three) times daily.    blood-glucose meter kit Check blood sugar three times daily and keep a log of your results to take to your primary care provider.    captopriL (CAPOTEN) 50 MG tablet Take 50 mg by mouth 2 (two) times daily.    carvediloL (COREG) 6.25 MG tablet Take 6.25 mg by mouth 2 (two) times daily.    cloNIDine (CATAPRES) 0.1 MG tablet Take 0.1 mg by mouth 2 (two) times daily.    diltiaZEM (TIAZAC) 240 MG Cs24 Take 240 mg by mouth once daily.    doxazosin (CARDURA) 4 MG tablet Take 8 mg by mouth every evening.    dulaglutide (TRULICITY) 0.75 mg/0.5 mL pen injector Inject 0.75 mg into the skin every 7 days. Mondays    FARXIGA 10 mg  tablet Take 10 mg by mouth once daily.    ferrous gluconate (FERGON) 324 MG tablet Take 1 tablet (324 mg total) by mouth 2 (two) times daily before meals. (Patient taking differently: Take 324 mg by mouth daily with breakfast.)    multivit-min-FA-lycopen-lutein (CENTRUM SILVER MEN) 300-600-300 mcg Tab Take 1 tablet by mouth once daily.    ondansetron (ZOFRAN-ODT) 8 MG TbDL Take 1 tablet (8 mg total) by mouth every 8 (eight) hours as needed (nausea).    pravastatin (PRAVACHOL) 40 MG tablet Take 40 mg by mouth once daily.    TRADJENTA 5 mg Tab tablet Take 5 mg by mouth once daily.     Family History       Problem Relation (Age of Onset)    Asthma Father    Diabetes Mother, Father    Hyperlipidemia Mother, Father          Tobacco Use    Smoking status: Never    Smokeless tobacco: Never   Vaping Use    Vaping status: Never Used   Substance and Sexual Activity    Alcohol use: No    Drug use: No    Sexual activity: Yes     Partners: Female     Review of Systems   Constitutional:  Positive for fatigue. Negative for chills and fever.   Eyes:  Negative for photophobia.   Respiratory:  Negative for cough, chest tightness, shortness of breath and wheezing.    Cardiovascular:  Negative for chest pain, palpitations and leg swelling.   Gastrointestinal:  Negative for abdominal pain, diarrhea, nausea and vomiting.   Genitourinary:  Negative for dysuria, flank pain and hematuria.   Musculoskeletal:  Negative for back pain, myalgias and neck pain.   Skin:  Negative for rash and wound.   Neurological:  Positive for dizziness and weakness. Negative for syncope and headaches.   Psychiatric/Behavioral:  Negative for agitation.      Objective:     Vital Signs (Most Recent):  Temp: 98.4 °F (36.9 °C) (07/29/25 1456)  Pulse: 91 (07/29/25 1900)  Resp: 18 (07/29/25 1543)  BP: (!) 194/88 (07/29/25 1600)  SpO2: 100 % (07/29/25 1900) Vital Signs (24h Range):  Temp:  [98.4 °F (36.9 °C)] 98.4 °F (36.9 °C)  Pulse:  [65-92] 91  Resp:  [18] 18  SpO2:   [99 %-100 %] 100 %  BP: (161-194)/(84-88) 194/88     Weight: 72.6 kg (160 lb)  Body mass index is 25.59 kg/m².     Physical Exam  Constitutional:       General: He is not in acute distress.     Appearance: He is well-developed.   HENT:      Head: Normocephalic and atraumatic.   Eyes:      Conjunctiva/sclera: Conjunctivae normal.      Pupils: Pupils are equal, round, and reactive to light.   Neck:      Vascular: No JVD.   Cardiovascular:      Rate and Rhythm: Normal rate and regular rhythm.      Heart sounds: Normal heart sounds.   Pulmonary:      Effort: Pulmonary effort is normal. No respiratory distress.      Breath sounds: Normal breath sounds. No wheezing.   Abdominal:      General: Bowel sounds are normal. There is no distension.      Palpations: Abdomen is soft.      Tenderness: There is no abdominal tenderness. There is no guarding.   Musculoskeletal:         General: No tenderness. Normal range of motion.      Cervical back: Normal range of motion and neck supple.   Skin:     General: Skin is warm and dry.      Capillary Refill: Capillary refill takes less than 2 seconds.      Findings: No erythema.   Neurological:      General: No focal deficit present.      Mental Status: He is alert and oriented to person, place, and time.      Cranial Nerves: No cranial nerve deficit.   Psychiatric:         Behavior: Behavior normal.              CRANIAL NERVES     CN III, IV, VI   Pupils are equal, round, and reactive to light.       Significant Labs: All pertinent labs within the past 24 hours have been reviewed.  CBC:   Recent Labs   Lab 07/29/25  1526 07/29/25  1527   WBC 7.09  --    HGB 7.7*  --    HCT 23.4* 23*   *  --      CMP:   Recent Labs   Lab 07/29/25  1526 07/29/25  1703   * 136   K 7.6* 5.4*  5.4*    108   CO2 20* 19*   * 379*   BUN 42* 43*   CREATININE 4.4* 4.3*   CALCIUM 8.5* 9.4   PROT 6.5  --    ALBUMIN 3.7 4.0   BILITOT 0.5  --    ALKPHOS 108  --    AST 17  --    ALT 11  --     ANIONGAP 5* 9         Significant Imaging: I have reviewed all pertinent imaging results/findings within the past 24 hours.

## 2025-07-30 NOTE — ASSESSMENT & PLAN NOTE
Hyperkalemia is likely due to KOURTNEY.The patients most recent potassium results are listed below.  Recent Labs     07/29/25  1526 07/29/25  1703   K 7.6* 5.4*  5.4*     Plan  - Monitor for arrhythmias with EKG and/or continuous telemetry.   - Treat the hyperkalemia with Potassium Binders, Calcium gluconate, IV insulin and dextrose, Nebulized albuterol sulfate, Furosemide, and Sodium Bicarbonate.   - Monitor potassium: Every 4 hours  - The patient's hyperkalemia is improving

## 2025-07-30 NOTE — PLAN OF CARE
Case Management Assessment     PCP: Angus  Pharmacy: [facility]    Patient Arrived From: home with spouse  Existing Help at Home: spouse    Barriers to Discharge: none    Discharge Plan:    A. home   B. home with family         07/30/25 1040   Discharge Planning   Assessment Type Discharge Planning Brief Assessment   Resource/Environmental Concerns none   Support Systems Spouse/significant other   Assistance Needed none   Equipment Currently Used at Home none   Current Living Arrangements home   Care Facility Name n/a   Patient/Family Anticipates Transition to home with family   Patient/Family Anticipated Services at Transition outpatient care  (Followup at [facility])   DME Needed Upon Discharge  none   Discharge Plan A Home   Discharge Plan B Home with family

## 2025-07-30 NOTE — ASSESSMENT & PLAN NOTE
Patient's FSGs are controlled on current medication regimen.  Last A1c reviewed-   Lab Results   Component Value Date    HGBA1C 6.1 (H) 08/29/2024     Most recent fingerstick glucose reviewed-   Recent Labs   Lab 07/29/25  1525   POCTGLUCOSE 337*     Current correctional scale  Low  Maintain anti-hyperglycemic dose as follows-   Antihyperglycemics (From admission, onward)      None          Hold Oral hypoglycemics while patient is in the hospital.

## 2025-07-30 NOTE — ASSESSMENT & PLAN NOTE
Anemia is likely due to chronic disease due to Chronic Kidney Disease. Most recent hemoglobin and hematocrit are listed below.  Recent Labs     07/29/25  1526 07/29/25  1527   HGB 7.7*  --    HCT 23.4* 23*     Plan  - Monitor serial CBC: Daily  - Transfuse PRBC if patient becomes hemodynamically unstable, symptomatic or H/H drops below 7/21.  - Patient has not received any PRBC transfusions to date  - Patient's anemia is currently stable

## 2025-08-06 ENCOUNTER — HOSPITAL ENCOUNTER (OUTPATIENT)
Dept: CARDIOLOGY | Facility: HOSPITAL | Age: 70
Discharge: HOME | End: 2025-08-06
Attending: NURSE PRACTITIONER
Payer: MEDICARE

## 2025-08-06 ENCOUNTER — OFFICE VISIT (OUTPATIENT)
Dept: CARDIOLOGY | Facility: CLINIC | Age: 70
End: 2025-08-06
Payer: MEDICARE

## 2025-08-06 VITALS
WEIGHT: 160 LBS | SYSTOLIC BLOOD PRESSURE: 151 MMHG | RESPIRATION RATE: 16 BRPM | DIASTOLIC BLOOD PRESSURE: 62 MMHG | HEIGHT: 66 IN | HEART RATE: 76 BPM | BODY MASS INDEX: 25.71 KG/M2

## 2025-08-06 VITALS
WEIGHT: 160.69 LBS | DIASTOLIC BLOOD PRESSURE: 85 MMHG | SYSTOLIC BLOOD PRESSURE: 179 MMHG | BODY MASS INDEX: 25.83 KG/M2 | OXYGEN SATURATION: 100 % | HEART RATE: 62 BPM | HEIGHT: 66 IN

## 2025-08-06 VITALS
BODY MASS INDEX: 25.71 KG/M2 | HEIGHT: 66 IN | HEART RATE: 75 BPM | DIASTOLIC BLOOD PRESSURE: 85 MMHG | SYSTOLIC BLOOD PRESSURE: 179 MMHG | WEIGHT: 160 LBS

## 2025-08-06 DIAGNOSIS — Z91.89 CARDIOVASCULAR EVENT RISK: ICD-10-CM

## 2025-08-06 DIAGNOSIS — Z76.82 ORGAN TRANSPLANT CANDIDATE: ICD-10-CM

## 2025-08-06 LAB
AORTIC SIZE INDEX (SOV): 1.6 CM/M2
AORTIC SIZE INDEX: 1.8 CM/M2
ASCENDING AORTA: 3.3 CM
AV AREA BY CONTINUOUS VTI: 2.2 CM2
AV INDEX (PROSTH): 0.63
AV LVOT MEAN GRADIENT: 3 MMHG
AV LVOT PEAK GRADIENT: 6 MMHG
AV MEAN GRADIENT: 8 MMHG
AV PEAK GRADIENT: 16 MMHG
AV VALVE AREA BY VELOCITY RATIO: 2.3 CM²
AV VALVE AREA: 2.2 CM2
AV VELOCITY RATIO: 0.65
BSA FOR ECHO PROCEDURE: 1.84 M2
CFR FLOW - ANTERIOR: 1.24
CFR FLOW - INFERIOR: 1.17
CFR FLOW - LATERAL: 1.21
CFR FLOW - MAX: 1.52
CFR FLOW - MIN: 0.97
CFR FLOW - SEPTAL: 1.34
CFR FLOW - WHOLE HEART: 1.24
CV ECHO LV RWT: 0.4 CM
CV STRESS BASE HR: 74 BPM
DIASTOLIC BLOOD PRESSURE: 87 MMHG
DOP CALC AO PEAK VEL: 2 M/S
DOP CALC AO VTI: 52.5 CM
DOP CALC LVOT AREA: 3.5 CM2
DOP CALC LVOT DIAMETER: 2.1 CM
DOP CALC LVOT PEAK VEL: 1.3 M/S
DOP CALCLVOT PEAK VEL VTI: 33 CM
E WAVE DECELERATION TIME: 230 MS
E/A RATIO: 0.78
E/E' RATIO: 24 M/S
ECHO EF ESTIMATED: 51 %
ECHO LV POSTERIOR WALL: 1 CM (ref 0.6–1.1)
EJECTION FRACTION- HIGH: 65 %
END DIASTOLIC INDEX-HIGH: 153 ML/M2
END DIASTOLIC INDEX-LOW: 93 ML/M2
END SYSTOLIC INDEX-HIGH: 71 ML/M2
END SYSTOLIC INDEX-LOW: 31 ML/M2
FRACTIONAL SHORTENING: 26 % (ref 28–44)
INTERVENTRICULAR SEPTUM: 0.9 CM (ref 0.6–1.1)
IVC DIAMETER: 1.91 CM
IVRT: 60 MS
LA MAJOR: 5.9 CM
LA MINOR: 5.8 CM
LA WIDTH: 4.4 CM
LEFT ATRIUM SIZE: 4.6 CM
LEFT ATRIUM VOLUME INDEX MOD: 49 ML/M2
LEFT ATRIUM VOLUME INDEX: 55 ML/M2
LEFT ATRIUM VOLUME MOD: 89 ML
LEFT ATRIUM VOLUME: 101 CM3
LEFT INTERNAL DIMENSION IN SYSTOLE: 3.7 CM (ref 2.1–4)
LEFT VENTRICLE DIASTOLIC VOLUME INDEX: 65.93 ML/M2
LEFT VENTRICLE DIASTOLIC VOLUME: 120 ML
LEFT VENTRICLE MASS INDEX: 93.4 G/M2
LEFT VENTRICLE SYSTOLIC VOLUME INDEX: 32.4 ML/M2
LEFT VENTRICLE SYSTOLIC VOLUME: 59 ML
LEFT VENTRICULAR INTERNAL DIMENSION IN DIASTOLE: 5 CM (ref 3.5–6)
LEFT VENTRICULAR MASS: 169.9 G
LV LATERAL E/E' RATIO: 19.7
LV SEPTAL E/E' RATIO: 29.5
Lab: 1.8 CM/M
Lab: 2 CM/M
MV A" WAVE DURATION": 159.85 MS
MV MEAN GRADIENT: 4 MMHG
MV PEAK A VEL: 1.51 M/S
MV PEAK E VEL: 1.18 M/S
MV PEAK GRADIENT: 11 MMHG
NUC REST DIASTOLIC VOLUME INDEX: 134
NUC REST EJECTION FRACTION: 55
NUC REST SYSTOLIC VOLUME INDEX: 60
NUC STRESS DIASTOLIC VOLUME INDEX: 136
NUC STRESS EJECTION FRACTION: 59 %
NUC STRESS SYSTOLIC VOLUME INDEX: 56
OHS CV CPX 1 MINUTE RECOVERY HEART RATE: 81 BPM
OHS CV CPX 85 PERCENT MAX PREDICTED HEART RATE MALE: 128
OHS CV CPX MAX PREDICTED HEART RATE: 150
OHS CV CPX PATIENT HEIGHT IN: 66
OHS CV CPX PATIENT HEIGHT IN: 66
OHS CV CPX PATIENT IS FEMALE: 0
OHS CV CPX PATIENT IS MALE: 1
OHS CV CPX PEAK DIASTOLIC BLOOD PRESSURE: 60 MMHG
OHS CV CPX PEAK HEAR RATE: 72 BPM
OHS CV CPX PEAK RATE PRESSURE PRODUCT: 9936
OHS CV CPX PEAK SYSTOLIC BLOOD PRESSURE: 138 MMHG
OHS CV CPX PERCENT MAX PREDICTED HEART RATE ACHIEVED: 48
OHS CV CPX RATE PRESSURE PRODUCT PRESENTING: NORMAL
OHS CV INITIAL DOSE: 22.8 MCG/KG/MIN
OHS CV MODERATELY REDUCED FLOW CAPACITY: 70 %
OHS CV NO ISCHEMIA MILDLY REDUCED FLOW CAPACTY: 20 %
OHS CV NO ISCHEMIA MINIMALLY REDUCED FLOW CAPACITY: 0 %
OHS CV NORMAL FLOW CAPACITY COMPARABLE TO HEALTHY YOUNG VOLUNTEERS: 0 %
OHS CV PEAK DOSE: 23 MCG/KG/MIN
OHS CV PET ID: 9469
OHS CV RV/LV RATIO: 0.66 CM
OHS CV SEVERELY REDUCED FLOW CAPACITY: 10 %
OHS CV TOTAL EXAM DLP: 200.27 MGY-CM
PISA TR MAX VEL: 2.6 M/S
PULM VEIN A" WAVE DURATION": 159.85 MS
PULM VEIN S/D RATIO: 1.68
PULMONIC VEIN PEAK A VELOCITY: 0.3 M/S
PV PEAK D VEL: 0.37 M/S
PV PEAK S VEL: 0.62 M/S
RA MAJOR: 4.78 CM
RA PRESSURE ESTIMATED: 3 MMHG
RA WIDTH: 4.06 CM
REST FLOW - ANTERIOR: 0.89 CC/MIN/G
REST FLOW - INFERIOR: 0.82 CC/MIN/G
REST FLOW - LATERAL: 0.8 CC/MIN/G
REST FLOW - MAX: 1.04 CC/MIN/G
REST FLOW - MIN: 0.56 CC/MIN/G
REST FLOW - SEPTAL: 0.79 CC/MIN/G
REST FLOW - WHOLE HEART: 0.83 CC/MIN/G
RETIRED EF AND QEF - SEE NOTES: 53 %
RIGHT ATRIAL AREA: 17.4 CM2
RIGHT VENTRICLE DIASTOLIC BASEL DIMENSION: 3.3 CM
RV TB RVSP: 6 MMHG
RV TISSUE DOPPLER FREE WALL SYSTOLIC VELOCITY 1 (APICAL 4 CHAMBER VIEW): 11.98 CM/S
SINUS: 3 CM
STJ: 2.7 CM
STRESS FLOW - ANTERIOR: 1.1 CC/MIN/G
STRESS FLOW - INFERIOR: 0.96 CC/MIN/G
STRESS FLOW - LATERAL: 0.96 CC/MIN/G
STRESS FLOW - MAX: 1.25 CC/MIN/G
STRESS FLOW - MIN: 0.75 CC/MIN/G
STRESS FLOW - SEPTAL: 1.06 CC/MIN/G
STRESS FLOW - WHOLE HEART: 1.02 CC/MIN/G
SYSTOLIC BLOOD PRESSURE: 169 MMHG
TDI LATERAL: 0.06 M/S
TDI SEPTAL: 0.04 M/S
TDI: 0.05 M/S
TRICUSPID ANNULAR PLANE SYSTOLIC EXCURSION: 2.3 CM
TV PEAK GRADIENT: 27 MMHG
TV REST PULMONARY ARTERY PRESSURE: 30 MMHG
Z-SCORE OF LEFT VENTRICULAR DIMENSION IN END DIASTOLE: -0.08
Z-SCORE OF LEFT VENTRICULAR DIMENSION IN END SYSTOLE: 1.37

## 2025-08-06 PROCEDURE — 78434 AQMBF PET REST & RX STRESS: CPT | Mod: TXP

## 2025-08-06 PROCEDURE — 93306 TTE W/DOPPLER COMPLETE: CPT | Mod: TXP

## 2025-08-06 PROCEDURE — A9555 RB82 RUBIDIUM: HCPCS | Mod: TXP | Performed by: NURSE PRACTITIONER

## 2025-08-06 PROCEDURE — 63600175 PHARM REV CODE 636 W HCPCS: Mod: TXP | Performed by: NURSE PRACTITIONER

## 2025-08-06 PROCEDURE — 99999 PR PBB SHADOW E&M-EST. PATIENT-LVL IV: CPT | Mod: PBBFAC,GC,TXP,

## 2025-08-06 RX ORDER — AMINOPHYLLINE 25 MG/ML
75 INJECTION, SOLUTION INTRAVENOUS
Status: COMPLETED | OUTPATIENT
Start: 2025-08-06 | End: 2025-08-06

## 2025-08-06 RX ORDER — REGADENOSON 0.08 MG/ML
0.4 INJECTION, SOLUTION INTRAVENOUS
Status: COMPLETED | OUTPATIENT
Start: 2025-08-06 | End: 2025-08-06

## 2025-08-06 RX ADMIN — AMINOPHYLLINE 75 MG: 25 INJECTION, SOLUTION INTRAVENOUS at 09:08

## 2025-08-06 RX ADMIN — REGADENOSON 0.4 MG: 0.08 INJECTION, SOLUTION INTRAVENOUS at 09:08

## 2025-08-06 RX ADMIN — RUBIDIUM CHLORIDE RB-82 22.8 MILLICURIE: 150 INJECTION, SOLUTION INTRAVENOUS at 09:08

## 2025-08-06 RX ADMIN — RUBIDIUM CHLORIDE RB-82 23 MILLICURIE: 150 INJECTION, SOLUTION INTRAVENOUS at 09:08

## 2025-08-06 NOTE — PROGRESS NOTES
Cardiology Clinic Note  Reason for Visit: kidney transplant pre-op evaluation      HPI:   70 year-old-man with a relevant past medical history of hypertension, hyperlipidemia, type 2 diabetes mellitus, CKD 5, TIA and prostate CA who presents today to cardiology clinic for kidney transplant pre-op evaluation. Patient has not taken any BP medications today and he is noted to have high blood pressure during visit  mm Hg. His BP is managed by his nephrologist. He denies any SOB, chest pain, palpitations, lower extremity edema, syncope, pre-syncope.        ROS:    Constitution: Negative for fever, chills, weight loss or gain.   HENT: Negative for sore throat, rhinorrhea, or headache.  Eyes: Negative for blurred or double vision.   Cardiovascular: See above  Pulmonary: Negative for SOB   Gastrointestinal: Negative for abdominal pain, nausea, vomiting, or diarrhea.   : Negative for dysuria.   Neurological: Negative for focal weakness or sensory changes.  PMH:     Past Medical History:   Diagnosis Date    Anemia     Cataract     COVID-19     Diabetes mellitus, type 2     Disorder of kidney and ureter     History of transient ischemic attack (TIA)     Hyperlipidemia     Hypertension 2004    Obstructive uropathy     Personal history of transient ischemic attack     Prostate cancer     Sickle cell trait     Syphilis, unspecified     Transient cerebral ischemic attack     Vitamin D deficiency      Past Surgical History:   Procedure Laterality Date    CATARACT EXTRACTION W/  INTRAOCULAR LENS IMPLANT Right 07/24/2020    CATARACT EXTRACTION W/  INTRAOCULAR LENS IMPLANT Left 08/04/2020    CYSTOSCOPY W/ URETERAL STENT PLACEMENT N/A 06/13/2021    Procedure: CYSTOSCOPY, WITH URETERAL STENT INSERTION;  Surgeon: APOORVA Salazar MD;  Location: [facility] OR;  Service: Urology;  Laterality: N/A;    EYE SURGERY      PROSTATECTOMY  05/19/2017    PROSTATECTOMY, RADICAL, LAPAROSCOPIC, RETROPUBIC APPROACH       Allergies:   Review of  patient's allergies indicates:  No Known Allergies  Medications:   Medications Ordered Prior to Encounter[1]  Social History:     Social History     Tobacco Use    Smoking status: Never    Smokeless tobacco: Never   Substance Use Topics    Alcohol use: No     Family History:     Family History   Problem Relation Name Age of Onset    Diabetes Mother      Hyperlipidemia Mother      Diabetes Father      Hyperlipidemia Father      Asthma Father       Physical Exam:   There were no vitals taken for this visit.   Wt Readings from Last 4 Encounters:   07/29/25 72.6 kg (160 lb)   07/23/25 72.8 kg (160 lb 7.9 oz)   08/29/24 68.8 kg (151 lb 10.8 oz)   07/11/23 72.6 kg (160 lb 0.9 oz)         Constitutional: No distress, obese, conversant  HEENT: Sclera anicteric, PERRLA  Neck: No JVD, no masses, good movement  CV: RRR, S1 and S2 normal, no additional heart sounds or murmurs. Pulses 2+ and equal bilaterally in radial arteries and femoral, DP and PT areas bilaterally  Pulm: Clear to auscultation bilaterally with symmetrical expansion.   GI: Abdomen soft, non-tender, good bowel sounds  Extremities: Both extremities intact and grossly normal, skin is warm, no edema noted  Skin: No ecchymosis, erythema, or ulcers        Labs:     Lab Results   Component Value Date     (L) 08/01/2025     08/29/2024    K 4.7 08/01/2025    K 4.7 08/29/2024     07/30/2025     07/30/2025     08/29/2024    CO2 22 (L) 08/01/2025    CO2 22 (L) 08/29/2024    BUN 39.5 (H) 08/01/2025    BUN 37 (H) 07/30/2025    BUN 37 (H) 07/30/2025    CREATININE 4.09 (H) 08/01/2025    CREATININE 4.0 (H) 07/30/2025    CREATININE 4.0 (H) 07/30/2025    GLUCOSE 403 (H) 08/01/2025    ANIONGAP 10 08/01/2025    ANIONGAP 10 07/30/2025    ANIONGAP 10 07/30/2025     Lab Results   Component Value Date    HGBA1C 10.0 (H) 07/29/2025    HGBA1C 6.1 (H) 08/29/2024     Lab Results   Component Value Date     (H) 06/13/2021    BNP 29 05/01/2021    BNP 19  08/24/2020    Lab Results   Component Value Date    WBC 6.36 07/30/2025    HGB 7.6 (L) 07/30/2025    HGB 12.1 (L) 12/17/2024    HGB 11.3 (L) 08/29/2024    HCT 23.0 (L) 07/30/2025    HCT 23 (L) 07/29/2025     07/30/2025     08/29/2024    GRAN 2.9 08/29/2024    GRAN 53.7 08/29/2024     Lab Results   Component Value Date    CHOL 157 08/29/2024    HDL 35 (L) 08/29/2024    LDLCALC 101.2 08/29/2024    TRIG 104 08/29/2024          Imaging:       EF   Date Value Ref Range Status   06/14/2021 50 % Final           Assessment and Plan:      # Pre-op evaluation for kidney transplant    Paatient with normal PET stress and Echo with no significative abnormal findings. There is only evidence of diastolic dysfunction and left atrial dilation. Patient able to achive > 4 METs. He is cleared from the cardiovascular stand point for kidney transplant surgery. Today his BP in the office was elevated, however he did not take any of his medications today. Will defer management of his hypertension to his nephrologist that according to the patient, they have been managing his BP         Staff: Dr. Almanzar    Signed:  Maldonado Petersen M.D.  Cardiology Fellow  [facility]  8/6/2025 7:37 AM             [1]   Current Outpatient Medications on File Prior to Visit   Medication Sig Dispense Refill    aspirin 81 MG Chew Take 81 mg by mouth once daily.      blood sugar diagnostic Strp 1 each by Misc.(Non-Drug; Combo Route) route 3 (three) times daily. 90 each 2    blood-glucose meter kit Check blood sugar three times daily and keep a log of your results to take to your primary care provider. 1 each 0    captopriL (CAPOTEN) 50 MG tablet Take 50 mg by mouth 2 (two) times daily.      carvediloL (COREG) 6.25 MG tablet Take 6.25 mg by mouth 2 (two) times daily.      cloNIDine (CATAPRES) 0.1 MG tablet Take 0.1 mg by mouth 2 (two) times daily.      diltiaZEM (TIAZAC) 240 MG Cs24 Take 240 mg by mouth once daily.      doxazosin  (CARDURA) 4 MG tablet Take 8 mg by mouth every evening.      dulaglutide (TRULICITY) 0.75 mg/0.5 mL pen injector Inject 0.75 mg into the skin every 7 days. Mondays      FARXIGA 10 mg tablet Take 10 mg by mouth once daily.      ferrous gluconate (FERGON) 324 MG tablet Take 1 tablet (324 mg total) by mouth 2 (two) times daily before meals. (Patient taking differently: Take 324 mg by mouth daily with breakfast.) 120 tablet 2    multivit-min-FA-lycopen-lutein (CENTRUM SILVER MEN) 300-600-300 mcg Tab Take 1 tablet by mouth once daily. 90 tablet 3    ondansetron (ZOFRAN-ODT) 8 MG TbDL Take 1 tablet (8 mg total) by mouth every 8 (eight) hours as needed (nausea). 30 tablet 1    pravastatin (PRAVACHOL) 40 MG tablet Take 40 mg by mouth once daily.      TRADJENTA 5 mg Tab tablet Take 5 mg by mouth once daily.       No current facility-administered medications on file prior to visit.

## 2025-08-11 ENCOUNTER — TELEPHONE (OUTPATIENT)
Dept: TRANSPLANT | Facility: CLINIC | Age: 70
End: 2025-08-11
Payer: MEDICARE

## 2025-08-11 ENCOUNTER — PATIENT MESSAGE (OUTPATIENT)
Dept: UROLOGY | Facility: CLINIC | Age: 70
End: 2025-08-11
Payer: MEDICARE

## 2025-08-11 DIAGNOSIS — N28.1 RENAL CYST: ICD-10-CM

## 2025-08-11 DIAGNOSIS — Z76.82 AWAITING ORGAN TRANSPLANT STATUS: ICD-10-CM

## 2025-08-11 DIAGNOSIS — N18.6 ESRD (END STAGE RENAL DISEASE): Primary | ICD-10-CM

## 2025-08-13 ENCOUNTER — EPISODE CHANGES (OUTPATIENT)
Dept: TRANSPLANT | Facility: CLINIC | Age: 70
End: 2025-08-13

## 2025-08-20 ENCOUNTER — LAB VISIT (OUTPATIENT)
Dept: LAB | Facility: HOSPITAL | Age: 70
End: 2025-08-20
Attending: UROLOGY
Payer: MEDICARE

## 2025-08-20 ENCOUNTER — OFFICE VISIT (OUTPATIENT)
Dept: UROLOGY | Facility: CLINIC | Age: 70
End: 2025-08-20
Payer: MEDICARE

## 2025-08-20 ENCOUNTER — HOSPITAL ENCOUNTER (EMERGENCY)
Facility: HOSPITAL | Age: 70
Discharge: HOME | End: 2025-08-20
Attending: EMERGENCY MEDICINE
Payer: MEDICARE

## 2025-08-20 VITALS
HEART RATE: 76 BPM | SYSTOLIC BLOOD PRESSURE: 189 MMHG | WEIGHT: 162 LBS | DIASTOLIC BLOOD PRESSURE: 89 MMHG | BODY MASS INDEX: 26.15 KG/M2 | RESPIRATION RATE: 17 BRPM | TEMPERATURE: 99 F | OXYGEN SATURATION: 98 %

## 2025-08-20 VITALS
WEIGHT: 160.94 LBS | SYSTOLIC BLOOD PRESSURE: 187 MMHG | BODY MASS INDEX: 25.86 KG/M2 | HEART RATE: 88 BPM | HEIGHT: 66 IN | DIASTOLIC BLOOD PRESSURE: 82 MMHG

## 2025-08-20 DIAGNOSIS — E87.5 HYPERKALEMIA: Primary | ICD-10-CM

## 2025-08-20 DIAGNOSIS — N28.1 CYST OF KIDNEY, ACQUIRED: Primary | ICD-10-CM

## 2025-08-20 DIAGNOSIS — C61 PROSTATE CANCER: ICD-10-CM

## 2025-08-20 LAB
ABSOLUTE EOSINOPHIL (OHS): 0.1 K/UL
ABSOLUTE MONOCYTE (OHS): 0.46 K/UL (ref 0.3–1)
ABSOLUTE NEUTROPHIL COUNT (OHS): 3.3 K/UL (ref 1.8–7.7)
ALBUMIN SERPL BCP-MCNC: 3.6 G/DL (ref 3.5–5.2)
ALP SERPL-CCNC: 104 UNIT/L (ref 40–150)
ALT SERPL W/O P-5'-P-CCNC: 21 UNIT/L (ref 10–44)
ANION GAP (OHS): 10 MMOL/L (ref 8–16)
ANION GAP (OHS): 9 MMOL/L (ref 8–16)
AST SERPL-CCNC: 16 UNIT/L (ref 11–45)
BASOPHILS # BLD AUTO: 0.02 K/UL
BASOPHILS NFR BLD AUTO: 0.3 %
BILIRUB SERPL-MCNC: 0.3 MG/DL (ref 0.1–1)
BUN SERPL-MCNC: 38 MG/DL (ref 8–23)
BUN SERPL-MCNC: 40 MG/DL (ref 8–23)
CALCIUM SERPL-MCNC: 8.3 MG/DL (ref 8.7–10.5)
CALCIUM SERPL-MCNC: 8.7 MG/DL (ref 8.7–10.5)
CHLORIDE SERPL-SCNC: 105 MMOL/L (ref 95–110)
CHLORIDE SERPL-SCNC: 107 MMOL/L (ref 95–110)
CO2 SERPL-SCNC: 20 MMOL/L (ref 23–29)
CO2 SERPL-SCNC: 20 MMOL/L (ref 23–29)
CREAT SERPL-MCNC: 4.5 MG/DL (ref 0.5–1.4)
CREAT SERPL-MCNC: 4.6 MG/DL (ref 0.5–1.4)
ERYTHROCYTE [DISTWIDTH] IN BLOOD BY AUTOMATED COUNT: 11.3 % (ref 11.5–14.5)
GFR SERPLBLD CREATININE-BSD FMLA CKD-EPI: 13 ML/MIN/1.73/M2
GFR SERPLBLD CREATININE-BSD FMLA CKD-EPI: 13 ML/MIN/1.73/M2
GLUCOSE SERPL-MCNC: 155 MG/DL (ref 70–110)
GLUCOSE SERPL-MCNC: 312 MG/DL (ref 70–110)
HCT VFR BLD AUTO: 24.1 % (ref 40–54)
HGB BLD-MCNC: 7.7 GM/DL (ref 14–18)
IMM GRANULOCYTES # BLD AUTO: 0.02 K/UL (ref 0–0.04)
IMM GRANULOCYTES NFR BLD AUTO: 0.3 % (ref 0–0.5)
LYMPHOCYTES # BLD AUTO: 1.85 K/UL (ref 1–4.8)
MCH RBC QN AUTO: 30.7 PG (ref 27–31)
MCHC RBC AUTO-ENTMCNC: 32 G/DL (ref 32–36)
MCV RBC AUTO: 96 FL (ref 82–98)
NUCLEATED RBC (/100WBC) (OHS): 0 /100 WBC
PLATELET # BLD AUTO: 141 K/UL (ref 150–450)
PMV BLD AUTO: 13 FL (ref 9.2–12.9)
POTASSIUM SERPL-SCNC: 4.7 MMOL/L (ref 3.5–5.1)
POTASSIUM SERPL-SCNC: 5.7 MMOL/L (ref 3.5–5.1)
PROT SERPL-MCNC: 6.6 GM/DL (ref 6–8.4)
PSA SERPL-MCNC: <0.01 NG/ML
RBC # BLD AUTO: 2.51 M/UL (ref 4.6–6.2)
RELATIVE EOSINOPHIL (OHS): 1.7 %
RELATIVE LYMPHOCYTE (OHS): 32.2 % (ref 18–48)
RELATIVE MONOCYTE (OHS): 8 % (ref 4–15)
RELATIVE NEUTROPHIL (OHS): 57.5 % (ref 38–73)
SODIUM SERPL-SCNC: 135 MMOL/L (ref 136–145)
SODIUM SERPL-SCNC: 136 MMOL/L (ref 136–145)
WBC # BLD AUTO: 5.75 K/UL (ref 3.9–12.7)

## 2025-08-20 PROCEDURE — 93005 ELECTROCARDIOGRAM TRACING: CPT | Mod: NTX

## 2025-08-20 PROCEDURE — 3046F HEMOGLOBIN A1C LEVEL >9.0%: CPT | Mod: CPTII,S$GLB,TXP, | Performed by: UROLOGY

## 2025-08-20 PROCEDURE — 1126F AMNT PAIN NOTED NONE PRSNT: CPT | Mod: CPTII,S$GLB,TXP, | Performed by: UROLOGY

## 2025-08-20 PROCEDURE — 99204 OFFICE O/P NEW MOD 45 MIN: CPT | Mod: S$GLB,TXP,, | Performed by: UROLOGY

## 2025-08-20 PROCEDURE — 85025 COMPLETE CBC W/AUTO DIFF WBC: CPT | Mod: NTX

## 2025-08-20 PROCEDURE — 84153 ASSAY OF PSA TOTAL: CPT | Mod: TXP

## 2025-08-20 PROCEDURE — 36415 COLL VENOUS BLD VENIPUNCTURE: CPT | Mod: TXP

## 2025-08-20 PROCEDURE — 93010 ELECTROCARDIOGRAM REPORT: CPT | Mod: NTX,,, | Performed by: INTERNAL MEDICINE

## 2025-08-20 PROCEDURE — 4010F ACE/ARB THERAPY RXD/TAKEN: CPT | Mod: CPTII,S$GLB,TXP, | Performed by: UROLOGY

## 2025-08-20 PROCEDURE — 99291 CRITICAL CARE FIRST HOUR: CPT | Mod: NTX

## 2025-08-20 PROCEDURE — 25000003 PHARM REV CODE 250: Mod: NTX | Performed by: EMERGENCY MEDICINE

## 2025-08-20 PROCEDURE — 3288F FALL RISK ASSESSMENT DOCD: CPT | Mod: CPTII,S$GLB,TXP, | Performed by: UROLOGY

## 2025-08-20 PROCEDURE — 63600175 PHARM REV CODE 636 W HCPCS: Mod: NTX | Performed by: EMERGENCY MEDICINE

## 2025-08-20 PROCEDURE — 3008F BODY MASS INDEX DOCD: CPT | Mod: CPTII,S$GLB,TXP, | Performed by: UROLOGY

## 2025-08-20 PROCEDURE — G2211 COMPLEX E/M VISIT ADD ON: HCPCS | Mod: S$GLB,TXP,, | Performed by: UROLOGY

## 2025-08-20 PROCEDURE — 3079F DIAST BP 80-89 MM HG: CPT | Mod: CPTII,S$GLB,TXP, | Performed by: UROLOGY

## 2025-08-20 PROCEDURE — 96361 HYDRATE IV INFUSION ADD-ON: CPT | Mod: NTX

## 2025-08-20 PROCEDURE — 99999 PR PBB SHADOW E&M-EST. PATIENT-LVL III: CPT | Mod: PBBFAC,TXP,, | Performed by: UROLOGY

## 2025-08-20 PROCEDURE — 3077F SYST BP >= 140 MM HG: CPT | Mod: CPTII,S$GLB,TXP, | Performed by: UROLOGY

## 2025-08-20 PROCEDURE — 1101F PT FALLS ASSESS-DOCD LE1/YR: CPT | Mod: CPTII,S$GLB,TXP, | Performed by: UROLOGY

## 2025-08-20 PROCEDURE — 96374 THER/PROPH/DIAG INJ IV PUSH: CPT | Mod: NTX

## 2025-08-20 PROCEDURE — 1159F MED LIST DOCD IN RCRD: CPT | Mod: CPTII,S$GLB,TXP, | Performed by: UROLOGY

## 2025-08-20 PROCEDURE — 84075 ASSAY ALKALINE PHOSPHATASE: CPT | Mod: NTX

## 2025-08-20 RX ORDER — FUROSEMIDE 10 MG/ML
120 INJECTION INTRAMUSCULAR; INTRAVENOUS
Status: COMPLETED | OUTPATIENT
Start: 2025-08-20 | End: 2025-08-20

## 2025-08-20 RX ADMIN — SODIUM ZIRCONIUM CYCLOSILICATE 10 G: 10 POWDER, FOR SUSPENSION ORAL at 02:08

## 2025-08-20 RX ADMIN — FUROSEMIDE 120 MG: 10 INJECTION, SOLUTION INTRAVENOUS at 02:08

## 2025-08-20 RX ADMIN — SODIUM CHLORIDE 1000 ML: 9 INJECTION, SOLUTION INTRAVENOUS at 02:08

## 2025-08-21 LAB
OHS QRS DURATION: 98 MS
OHS QTC CALCULATION: 403 MS

## 2025-08-29 ENCOUNTER — HOSPITAL ENCOUNTER (OUTPATIENT)
Dept: RADIOLOGY | Facility: HOSPITAL | Age: 70
Discharge: HOME | End: 2025-08-29
Attending: UROLOGY
Payer: MEDICARE

## (undated) DEVICE — MAT QUICK 40X30 FLOOR FLUID LF

## (undated) DEVICE — COVER SNAP KAP 26IN

## (undated) DEVICE — SUPPORT ULNA NERVE PROTECTOR

## (undated) DEVICE — Device

## (undated) DEVICE — SOL IRR NACL .9% 3000ML

## (undated) DEVICE — GLOVE SURG BIOGEL LATEX SZ 7.5

## (undated) DEVICE — CATH URTRL OPEN END STR TIP 5F

## (undated) DEVICE — BAG URINARY DRN 2000ML

## (undated) DEVICE — SENSOR DUAL FLEX STR 150CM

## (undated) DEVICE — GOWN B1 X-LG X-LONG

## (undated) DEVICE — WIRE GUIDE .035 150CM.

## (undated) DEVICE — GLOVE BIOGEL PI MICRO SZ 7